# Patient Record
Sex: FEMALE | Race: WHITE | NOT HISPANIC OR LATINO | Employment: FULL TIME | URBAN - METROPOLITAN AREA
[De-identification: names, ages, dates, MRNs, and addresses within clinical notes are randomized per-mention and may not be internally consistent; named-entity substitution may affect disease eponyms.]

---
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
## 2019-01-08 NOTE — PROGRESS NOTES
SUBJECTIVE:   Arlen William is a 18 year old female who presents to clinic today for the following health issues:      History of Present Illness     Diet:  Regular (no restrictions)  Frequency of exercise:  4-5 days/week  Duration of exercise:  Greater than 60 minutes  Taking medications regularly:  Yes  Medication side effects:  None  Additional concerns today:  No    Rash    Onset: 12/20/2018 - vaginal rash.      Ongoing rash since last office on 12/20/2018 - chest and forearms - Dx tinea versicolor 12/20/18- note from Phuong Montes DNP reviewed. Treated w/oral fluconazole. She does not think it is better on the arms. She still has more doses to take.     Skin on outside labia, raised patch of skin. Itches. Noticed after she shaved in the area a few weeks ago.   Sexually active not presently. Last 7-8 mo ago. Using condoms. No vaginal discharge.  No LMP recorded (lmp unknown). Patient has had an implant. Nexplanon.   Just had STD testing and was negative.   No new topicals. Using bath and body works scented shower gels    Description:   Location: both forearms , chest , vagina   Character: white bumps , blotchy , raised   Itching (Pruritis): YES    Progression of Symptoms:  worsening    Accompanying Signs & Symptoms:  Fever: no   Body aches or joint pain: no   Sore throat symptoms: no   Recent cold symptoms: no     History:   Previous similar rash: no     Precipitating factors:   Exposure to similar rash: no   New exposures: None   Recent travel: no     Therapies Tried and outcome: Diflucan 150 MG   Problem list and histories reviewed & adjusted, as indicated.  Additional history: as documented        Patient Active Problem List   Diagnosis     Environmental allergies     Mild persistent asthma     Scoliosis     Leg length discrepancy     Torn ACL     Chronic rhinitis     Tinea versicolor     Dysmenorrhea     Other acne     Chest wall pain     Oropharyngeal dysphagia     Exercise-induced asthma     Past  Surgical History:   Procedure Laterality Date     ARTHROSCOPIC RECONSTRUCTION ANTERIOR CRUCIATE LIGAMENT  8/19/13    left     ESOPHAGOSCOPY, GASTROSCOPY, DUODENOSCOPY (EGD), COMBINED N/A 12/2/2016    Procedure: COMBINED ESOPHAGOSCOPY, GASTROSCOPY, DUODENOSCOPY (EGD), BIOPSY SINGLE OR MULTIPLE;  Surgeon: Laurie Pride MD;  Location: [facility]      HC TOOTH EXTRACTION W/FORCEP         Social History     Tobacco Use     Smoking status: Never Smoker     Smokeless tobacco: Never Used     Tobacco comment: no smokers at home   Substance Use Topics     Alcohol use: No     Comment: none      Family History   Problem Relation Age of Onset     Diabetes Mother         gestational      GERD Mother      Gallbladder Disease Mother      Diabetes Maternal Grandmother      Cancer Maternal Grandmother         ovarian and uterine     Hypertension Father      Lipids Father      Hypertension Paternal Grandfather      Family History Negative No family hx of      Asthma No family hx of         Eosinophilic esophagitis.     C.A.D. No family hx of      Breast Cancer No family hx of      Cerebrovascular Disease No family hx of      Cancer - colorectal No family hx of      Prostate Cancer No family hx of      Alcohol/Drug No family hx of      Allergies No family hx of      Alzheimer Disease No family hx of      Anesthesia Reaction No family hx of      Arthritis No family hx of      Blood Disease No family hx of      Cardiovascular No family hx of      Circulatory No family hx of      Congenital Anomalies No family hx of      Connective Tissue Disorder No family hx of      Depression No family hx of      Endocrine Disease No family hx of      Eye Disorder No family hx of      Genetic Disorder No family hx of      Gastrointestinal Disease No family hx of      Genitourinary Problems No family hx of      Gynecology No family hx of      Musculoskeletal Disorder No family hx of      Anxiety Disorder No family hx of       "Mental Illness No family hx of      Substance Abuse No family hx of      Colon Cancer No family hx of      Other Cancer No family hx of      Thyroid Disease No family hx of      Obesity No family hx of      Osteoporosis No family hx of      Unknown/Adopted No family hx of      Hyperlipidemia No family hx of      Coronary Artery Disease No family hx of      Other - See Comments No family hx of          Current Outpatient Medications   Medication Sig Dispense Refill     Cholecalciferol (VITAMIN D) 1000 UNITS capsule Take 1 capsule by mouth daily.       Etonogestrel (NEXPLANON SC)        fluconazole (DIFLUCAN) 150 MG tablet Take 1 tablet (150 mg) by mouth every 3 days 8 tablet 0     Multiple Vitamin (MULTIVITAMINS PO) Take 1 tablet by mouth daily.       albuterol (PROAIR HFA) 108 (90 Base) MCG/ACT inhaler Inhale 2 puffs into the lungs 4 times daily as needed for shortness of breath / dyspnea (Patient not taking: Reported on 1/9/2019) 1 Inhaler 1     No Known Allergies  BP Readings from Last 3 Encounters:   01/09/19 112/70 (40 %/ 64 %)*   12/20/18 116/70 (61 %/ 63 %)*   12/12/17 112/80 (46 %/ 92 %)*     *BP percentiles are based on the August 2017 AAP Clinical Practice Guideline for girls    Wt Readings from Last 3 Encounters:   01/09/19 68.9 kg (152 lb) (82 %)*   12/20/18 68.5 kg (151 lb) (82 %)*   12/15/17 70.8 kg (156 lb) (87 %)*     * Growth percentiles are based on CDC (Girls, 2-20 Years) data.                  Labs reviewed in EPIC    ROS:  Constitutional, HEENT, cardiovascular, pulmonary, gi and gu systems are negative, except as otherwise noted.    OBJECTIVE:     /70   Pulse 75   Temp 98.4  F (36.9  C) (Temporal)   Resp 18   Ht 1.727 m (5' 8\")   Wt 68.9 kg (152 lb)   LMP  (LMP Unknown)   SpO2 100%   Breastfeeding? No   BMI 23.11 kg/m    Body mass index is 23.11 kg/m .  GENERAL: healthy, alert and no distress   (female): normal female external genitalia, normal urethral meatus, vaginal mucosa, " "normal cervix/adnexa/uterus without masses or discharge  SKIN: right labia majora, upper portion near clitoral alexis,: skin is shaved, 1 cm of mildly pink inflammed appearing tissue. No papules, pustules, ulcers, of lesions. Vaginal tissue normal, no discharge at introitus, and exam otherwise normal. No spec or bimanual done.  Forearms: small 2-3mm faint hypo-pigmented macules on forearms. Chest and upper posterior torso is normal.   Diagnostics: none.    ASSESSMENT/PLAN:     1. Labial irritation  Very benign appearance. Inflammation from shaving or topical irritation.   Not consistent w/STD.   Discussed topical hydorcortisone for a short course, avoid shaving this area, \"free and clear\" products/Dove soap    2. Tinea versicolor  Chest and upper back clear  Forearms minimal lesions.   Pt is finishing fluconazole therapy still.      Follow Up: The patient was instructed to contact clinic for worsening symptoms, non-improvement as expected/discussed, and for questions regarding medications or treatment plan. Discussed parameters for follow up and included in After Visit Summary given to patient.      Kristy Tillman PA-C  [facility]  "

## 2019-01-09 ENCOUNTER — OFFICE VISIT (OUTPATIENT)
Dept: FAMILY MEDICINE | Facility: CLINIC | Age: 19
End: 2019-01-09
Payer: COMMERCIAL

## 2019-01-09 VITALS
HEART RATE: 75 BPM | WEIGHT: 152 LBS | BODY MASS INDEX: 23.04 KG/M2 | HEIGHT: 68 IN | SYSTOLIC BLOOD PRESSURE: 112 MMHG | RESPIRATION RATE: 18 BRPM | OXYGEN SATURATION: 100 % | DIASTOLIC BLOOD PRESSURE: 70 MMHG | TEMPERATURE: 98.4 F

## 2019-01-09 DIAGNOSIS — B36.0 TINEA VERSICOLOR: ICD-10-CM

## 2019-01-09 DIAGNOSIS — N90.89 LABIAL IRRITATION: Primary | ICD-10-CM

## 2019-01-09 PROCEDURE — 99213 OFFICE O/P EST LOW 20 MIN: CPT | Performed by: PHYSICIAN ASSISTANT

## 2019-01-09 ASSESSMENT — PAIN SCALES - GENERAL: PAINLEVEL: NO PAIN (0)

## 2019-01-09 ASSESSMENT — MIFFLIN-ST. JEOR: SCORE: 1512.97

## 2019-01-09 NOTE — PATIENT INSTRUCTIONS
Would use over the counter hydrocortisone 1% cream to the area twice daily.   Avoid shaving in the this area to allow to calm down    Does not have appearance of an STD. More typical of irritation/inflammation from shaving.

## 2019-05-28 NOTE — PROGRESS NOTES
Subjective     Arlen William is a 18 year old female who presents to clinic today for the following health issues:    HPI     Vaginal Symptoms  Onset: One month      Description:  Vaginal Discharge: none   Itching (Pruritis): YES  Burning sensation:  no   Odor: no     Accompanying Signs & Symptoms:  Pain with Urination: no   Abdominal Pain: no   Fever: no     History:   Sexually active: YES  New Partner: YES  Possibility of Pregnancy:  No    Precipitating factors:   Recent Antibiotic Use: no     Alleviating factors:  NA  Therapies Tried and outcome: Nothing     She previously saw Kristy Tillman, who thought that the red bumps, which at that time were more external, were related to shaving irritation. The red bumps and itching is more internal now. She is worried that it could be a soap that she is using, or an STD as she is sexually active currently. She hasn't had intercourse in over a month, and her condition hasn't worsened since then.   She has never had a Pap smear and doesn't know if she needs one. She denies burning with urination, her only symptom at this time is the itching.     Patient Active Problem List   Diagnosis     Environmental allergies     Mild persistent asthma     Scoliosis     Leg length discrepancy     Torn ACL     Chronic rhinitis     Tinea versicolor     Dysmenorrhea     Other acne     Chest wall pain     Oropharyngeal dysphagia     Exercise-induced asthma     Past Surgical History:   Procedure Laterality Date     ARTHROSCOPIC RECONSTRUCTION ANTERIOR CRUCIATE LIGAMENT  8/19/13    left     ESOPHAGOSCOPY, GASTROSCOPY, DUODENOSCOPY (EGD), COMBINED N/A 12/2/2016    Procedure: COMBINED ESOPHAGOSCOPY, GASTROSCOPY, DUODENOSCOPY (EGD), BIOPSY SINGLE OR MULTIPLE;  Surgeon: Laurie Pride MD;  Location:  [facility]      HC TOOTH EXTRACTION W/FORCEP         Social History     Tobacco Use     Smoking status: Never Smoker     Smokeless tobacco: Never Used     Tobacco comment: no  smokers at home   Substance Use Topics     Alcohol use: No     Comment: none      Family History   Problem Relation Age of Onset     Diabetes Mother         gestational      GERD Mother      Gallbladder Disease Mother      Diabetes Maternal Grandmother      Cancer Maternal Grandmother         ovarian and uterine     Hypertension Father      Lipids Father      Hypertension Paternal Grandfather      Family History Negative No family hx of      Asthma No family hx of         Eosinophilic esophagitis.     C.A.D. No family hx of      Breast Cancer No family hx of      Cerebrovascular Disease No family hx of      Cancer - colorectal No family hx of      Prostate Cancer No family hx of      Alcohol/Drug No family hx of      Allergies No family hx of      Alzheimer Disease No family hx of      Anesthesia Reaction No family hx of      Arthritis No family hx of      Blood Disease No family hx of      Cardiovascular No family hx of      Circulatory No family hx of      Congenital Anomalies No family hx of      Connective Tissue Disorder No family hx of      Depression No family hx of      Endocrine Disease No family hx of      Eye Disorder No family hx of      Genetic Disorder No family hx of      Gastrointestinal Disease No family hx of      Genitourinary Problems No family hx of      Gynecology No family hx of      Musculoskeletal Disorder No family hx of      Anxiety Disorder No family hx of      Mental Illness No family hx of      Substance Abuse No family hx of      Colon Cancer No family hx of      Other Cancer No family hx of      Thyroid Disease No family hx of      Obesity No family hx of      Osteoporosis No family hx of      Unknown/Adopted No family hx of      Hyperlipidemia No family hx of      Coronary Artery Disease No family hx of      Other - See Comments No family hx of          Current Outpatient Medications   Medication Sig Dispense Refill     Cholecalciferol (VITAMIN D) 1000 UNITS capsule Take 1 capsule by  "mouth daily.       Etonogestrel (NEXPLANON SC)        Multiple Vitamin (MULTIVITAMINS PO) Take 1 tablet by mouth daily.       Reviewed and updated as needed this visit by Provider       Review of Systems   Constitutional, HEENT, cardiovascular, pulmonary, GI, , musculoskeletal, neuro, skin, endocrine and psych systems are negative, except as in HPI or otherwise noted.     This document serves as a record of the services and decisions personally performed and made by Nuria Farr MD. It was created on her behalf by Ludmila Cunningham, a trained medical scribe. The creation of this document is based the provider's statements to the medical scribe.  Ludmila Cunningham, May 29, 2019 11:12 AM         Objective    /68 (BP Location: Left arm, Patient Position: Chair, Cuff Size: Adult Regular)   Pulse 70   Temp 98.6  F (37  C) (Temporal)   Resp 16   Ht 1.727 m (5' 8\")   Wt 69.6 kg (153 lb 8 oz)   SpO2 99%   Breastfeeding? No   BMI 23.34 kg/m     Body mass index is 23.34 kg/m .  Physical Exam   GENERAL: healthy, alert and no distress  RESP: lungs clear to auscultation - no rales, rhonchi or wheezes  CV: regular rate and rhythm, normal S1 S2, no S3 or S4, no murmur, click or rub, no peripheral edema and peripheral pulses strong   (female): normal female external genitalia, normal urethral meatus, vaginal mucosa pink, moist, well rugated, and normal cervix/adnexa/uterus without masses. Moderate amount of normal discharge noted. Swab collected and sent to the lab. There is no external or internal rash appreciated.   SKIN: no suspicious lesions or rashes to visible skin  PSYCH: mentation appears normal, affect normal/bright    Diagnostic Test Results:  Results for orders placed or performed in visit on 05/29/19 (from the past 24 hour(s))   Wet prep   Result Value Ref Range    Specimen Description Vagina     Wet Prep No Trichomonas seen     Wet Prep No clue cells seen     Wet Prep No yeast seen     Wet Prep No WBCs seen        "     Assessment & Plan       ICD-10-CM    1. Vaginal itching N89.8 Wet prep      Vaginal Itching: Wet prep was normal today. Offered GC/Chlamydia testing today, but she has concerns with cost. Recommended that she visit a community resource for STD testing such as [facility].     Patient Instructions   Wet prep was negative for possible causes of itching.         No follow-ups on file.    The information in this document, created by the medical scribe for me, accurately reflects the services I personally performed and the decisions made by me. I have reviewed and approved this document for accuracy.     Nuria Farr MD, MD  [facility]

## 2019-05-29 ENCOUNTER — OFFICE VISIT (OUTPATIENT)
Dept: FAMILY MEDICINE | Facility: OTHER | Age: 19
End: 2019-05-29
Payer: COMMERCIAL

## 2019-05-29 VITALS
HEIGHT: 68 IN | BODY MASS INDEX: 23.27 KG/M2 | DIASTOLIC BLOOD PRESSURE: 68 MMHG | OXYGEN SATURATION: 99 % | TEMPERATURE: 98.6 F | RESPIRATION RATE: 16 BRPM | WEIGHT: 153.5 LBS | HEART RATE: 70 BPM | SYSTOLIC BLOOD PRESSURE: 122 MMHG

## 2019-05-29 DIAGNOSIS — N89.8 VAGINAL ITCHING: Primary | ICD-10-CM

## 2019-05-29 LAB
SPECIMEN SOURCE: NORMAL
WET PREP SPEC: NORMAL

## 2019-05-29 PROCEDURE — 99213 OFFICE O/P EST LOW 20 MIN: CPT | Performed by: FAMILY MEDICINE

## 2019-05-29 PROCEDURE — 87210 SMEAR WET MOUNT SALINE/INK: CPT | Performed by: FAMILY MEDICINE

## 2019-05-29 ASSESSMENT — MIFFLIN-ST. JEOR: SCORE: 1519.77

## 2019-06-07 ENCOUNTER — OFFICE VISIT (OUTPATIENT)
Dept: PEDIATRICS | Facility: CLINIC | Age: 19
End: 2019-06-07
Payer: COMMERCIAL

## 2019-06-07 VITALS
SYSTOLIC BLOOD PRESSURE: 108 MMHG | WEIGHT: 153.9 LBS | TEMPERATURE: 98.1 F | OXYGEN SATURATION: 98 % | BODY MASS INDEX: 23.33 KG/M2 | DIASTOLIC BLOOD PRESSURE: 73 MMHG | HEART RATE: 79 BPM | HEIGHT: 68 IN

## 2019-06-07 DIAGNOSIS — J02.9 SORE THROAT: ICD-10-CM

## 2019-06-07 DIAGNOSIS — L04.0 ACUTE CERVICAL ADENITIS: Primary | ICD-10-CM

## 2019-06-07 DIAGNOSIS — B27.90 INFECTIOUS MONONUCLEOSIS WITHOUT COMPLICATION, INFECTIOUS MONONUCLEOSIS DUE TO UNSPECIFIED ORGANISM: ICD-10-CM

## 2019-06-07 LAB
BASOPHILS # BLD AUTO: 0.1 10E9/L (ref 0–0.2)
BASOPHILS NFR BLD AUTO: 1 %
DEPRECATED S PYO AG THROAT QL EIA: NORMAL
DIFFERENTIAL METHOD BLD: ABNORMAL
ERYTHROCYTE [DISTWIDTH] IN BLOOD BY AUTOMATED COUNT: 12.2 % (ref 10–15)
HCT VFR BLD AUTO: 40 % (ref 35–47)
HETEROPH AB SER QL: POSITIVE
HGB BLD-MCNC: 13 G/DL (ref 11.7–15.7)
LYMPHOCYTES # BLD AUTO: 8.3 10E9/L (ref 0.8–5.3)
LYMPHOCYTES NFR BLD AUTO: 75 %
MCH RBC QN AUTO: 29.3 PG (ref 26.5–33)
MCHC RBC AUTO-ENTMCNC: 32.5 G/DL (ref 31.5–36.5)
MCV RBC AUTO: 90 FL (ref 78–100)
NEUTROPHILS # BLD AUTO: 2.7 10E9/L (ref 1.6–8.3)
NEUTROPHILS NFR BLD AUTO: 24 %
PLATELET # BLD AUTO: 199 10E9/L (ref 150–450)
PLATELET # BLD EST: ABNORMAL 10*3/UL
RBC # BLD AUTO: 4.44 10E12/L (ref 3.8–5.2)
SMUDGE CELLS BLD QL SMEAR: PRESENT
SPECIMEN SOURCE: NORMAL
VARIANT LYMPHS BLD QL SMEAR: PRESENT
WBC # BLD AUTO: 11.1 10E9/L (ref 4–11)

## 2019-06-07 PROCEDURE — 87081 CULTURE SCREEN ONLY: CPT | Performed by: FAMILY MEDICINE

## 2019-06-07 PROCEDURE — 85025 COMPLETE CBC W/AUTO DIFF WBC: CPT | Performed by: FAMILY MEDICINE

## 2019-06-07 PROCEDURE — 99214 OFFICE O/P EST MOD 30 MIN: CPT | Performed by: FAMILY MEDICINE

## 2019-06-07 PROCEDURE — 36415 COLL VENOUS BLD VENIPUNCTURE: CPT | Performed by: FAMILY MEDICINE

## 2019-06-07 PROCEDURE — 86308 HETEROPHILE ANTIBODY SCREEN: CPT | Performed by: FAMILY MEDICINE

## 2019-06-07 PROCEDURE — 87880 STREP A ASSAY W/OPTIC: CPT | Performed by: FAMILY MEDICINE

## 2019-06-07 RX ORDER — PREDNISONE 20 MG/1
20 TABLET ORAL DAILY
Qty: 5 TABLET | Refills: 0 | Status: SHIPPED | OUTPATIENT
Start: 2019-06-07 | End: 2019-07-29

## 2019-06-07 ASSESSMENT — MIFFLIN-ST. JEOR: SCORE: 1521.59

## 2019-06-07 NOTE — RESULT ENCOUNTER NOTE
Dear Arlen,  Your mono test came back positive likely explaining your current symptoms.  As we discussed, I have sent a prescription for prednisone to help with swelling and pain.  You can take over-the-counter ibuprofen 400 mg up to 3 times a day as needed for pain.  Make sure to drink plenty of liquids, rest well, avoid exertional physical workouts until you feel fine  The throat swab for strep test is negative.  Please follow-up with Phuong allen or with us in 1 week for recheck or sooner if needed.  Let me know if you have any questions. Take care.  Andrew Crenshaw MD

## 2019-06-07 NOTE — PROGRESS NOTES
"Subjective     Arlen William is a 18 year old female who presents to clinic today for the following health issues:    HPI   Acute Illness    Patient is new to the provider, she is here today with concerns of swollen and painful lymph nodes on both sides of the neck, right side worse than the left for the past 6 to 7 days associated with mild sore throat and nasal congestion and with no associated concerns for fever, chills, voice changes, cough, shortness of breath, wheezing, abdominal pain, bloating, vomiting, abnormal skin rashes.  Patient denies previous similar symptoms in the past.    Denies previous history of mono or recurrent history of streptococcal infections  Does not smoke. Has no h/o asthnma or allergies.       Acute illness concerns: Swollen lymph nodes  Onset: 6 days    Fever: no    Chills/Sweats: no     Headache (location?): YES- constant, dull ache    Sinus Pressure:no    Conjunctivitis:  no    Ear Pain: no    Rhinorrhea: no    Congestion: YES    Sore Throat: YES- a little     Cough: no    Wheeze: no    Decreased Appetite: no    Nausea: YES    Vomiting: no    Diarrhea:  no    Dysuria/Freq.: no    Fatigue/Achiness: YES- tired    Sick/Strep Exposure: no    Pt. States shoulder and neck area has been \"achy\" as well.   Therapies Tried and outcome: Ibuprofen         Patient Active Problem List   Diagnosis     Environmental allergies     Mild persistent asthma     Scoliosis     Leg length discrepancy     Torn ACL     Chronic rhinitis     Tinea versicolor     Dysmenorrhea     Other acne     Chest wall pain     Oropharyngeal dysphagia     Exercise-induced asthma     Past Surgical History:   Procedure Laterality Date     ARTHROSCOPIC RECONSTRUCTION ANTERIOR CRUCIATE LIGAMENT  8/19/13    left     ESOPHAGOSCOPY, GASTROSCOPY, DUODENOSCOPY (EGD), COMBINED N/A 12/2/2016    Procedure: COMBINED ESOPHAGOSCOPY, GASTROSCOPY, DUODENOSCOPY (EGD), BIOPSY SINGLE OR MULTIPLE;  Surgeon: Laurie Pride, " MD;  Location:  [facility]      HC TOOTH EXTRACTION W/FORCEP         Social History     Tobacco Use     Smoking status: Never Smoker     Smokeless tobacco: Never Used     Tobacco comment: no smokers at home   Substance Use Topics     Alcohol use: No     Comment: none      Family History   Problem Relation Age of Onset     Diabetes Mother         gestational      GERD Mother      Gallbladder Disease Mother      Diabetes Maternal Grandmother      Cancer Maternal Grandmother         ovarian and uterine     Hypertension Father      Lipids Father      Hypertension Paternal Grandfather      Family History Negative No family hx of      Asthma No family hx of         Eosinophilic esophagitis.     C.A.D. No family hx of      Breast Cancer No family hx of      Cerebrovascular Disease No family hx of      Cancer - colorectal No family hx of      Prostate Cancer No family hx of      Alcohol/Drug No family hx of      Allergies No family hx of      Alzheimer Disease No family hx of      Anesthesia Reaction No family hx of      Arthritis No family hx of      Blood Disease No family hx of      Cardiovascular No family hx of      Circulatory No family hx of      Congenital Anomalies No family hx of      Connective Tissue Disorder No family hx of      Depression No family hx of      Endocrine Disease No family hx of      Eye Disorder No family hx of      Genetic Disorder No family hx of      Gastrointestinal Disease No family hx of      Genitourinary Problems No family hx of      Gynecology No family hx of      Musculoskeletal Disorder No family hx of      Anxiety Disorder No family hx of      Mental Illness No family hx of      Substance Abuse No family hx of      Colon Cancer No family hx of      Other Cancer No family hx of      Thyroid Disease No family hx of      Obesity No family hx of      Osteoporosis No family hx of      Unknown/Adopted No family hx of      Hyperlipidemia No family hx of      Coronary Artery Disease No  "family hx of      Other - See Comments No family hx of          Current Outpatient Medications   Medication Sig Dispense Refill     Cholecalciferol (VITAMIN D) 1000 UNITS capsule Take 1 capsule by mouth daily.       Etonogestrel (NEXPLANON SC)        IBUPROFEN PO Take by mouth as needed for moderate pain       Multiple Vitamin (MULTIVITAMINS PO) Take 1 tablet by mouth daily.       No Known Allergies  No lab results found.   BP Readings from Last 3 Encounters:   06/07/19 108/73   05/29/19 122/68   01/09/19 112/70    Wt Readings from Last 3 Encounters:   06/07/19 69.8 kg (153 lb 14.4 oz) (83 %)*   05/29/19 69.6 kg (153 lb 8 oz) (83 %)*   01/09/19 68.9 kg (152 lb) (82 %)*     * Growth percentiles are based on CDC (Girls, 2-20 Years) data.                      Reviewed and updated as needed this visit by Provider         Review of Systems   ROS COMP: CONSTITUTIONAL: NEGATIVE for fever, chills, change in weight  INTEGUMENTARY/SKIN: NEGATIVE for worrisome rashes, moles or lesions  EYES: NEGATIVE for vision changes or irritation  ENT/MOUTH: as above  RESP: NEGATIVE for significant cough or SOB  CV: NEGATIVE for chest pain, palpitations or peripheral edema  GI: NEGATIVE for nausea, abdominal pain, heartburn, or change in bowel habits  MUSCULOSKELETAL: NEGATIVE for significant arthralgias or myalgia  PSYCHIATRIC: NEGATIVE for changes in mood or affect      Objective    /73 (BP Location: Right arm, Patient Position: Sitting, Cuff Size: Adult Regular)   Pulse 79   Temp 98.1  F (36.7  C) (Oral)   Ht 1.727 m (5' 8\")   Wt 69.8 kg (153 lb 14.4 oz)   SpO2 98%   BMI 23.40 kg/m     Body mass index is 23.4 kg/m .  Physical Exam   GENERAL: healthy, alert and no distress  EYES: Eyes grossly normal to inspection  HENT: normal cephalic/atraumatic, ear canals and TM's normal, nose and mouth without ulcers or lesions, oropharynx clear, oral mucous membranes moist, sinuses: not tender and minimal posterior pharyngeal wall and " tonsillar erythema and with no exudates  NECK: bilateral tender, anterior cervical adenopathy, no asymmetry, masses, or scars and thyroid normal to palpation  RESP: lungs clear to auscultation - no rales, rhonchi or wheezes  CV: regular rate and rhythm, normal S1 S2, no S3 or S4, no murmur, click or rub, no peripheral edema and peripheral pulses strong  ABDOMEN: soft, nontender, no hepatosplenomegaly, no masses and bowel sounds normal  MS: no gross musculoskeletal defects noted, no edema  SKIN: no suspicious lesions or rashes  PSYCH: mentation appears normal, affect normal/bright    Diagnostic Test Results:  Labs reviewed in Epic  Results for orders placed or performed in visit on 06/07/19 (from the past 24 hour(s))   Strep, Rapid Screen   Result Value Ref Range    Specimen Description Throat     Rapid Strep A Screen       NEGATIVE: No Group A streptococcal antigen detected by immunoassay, await culture report.   Mononucleosis screen   Result Value Ref Range    Mononucleosis Screen Positive (A) NEG^Negative   CBC with platelets and differential   Result Value Ref Range    WBC 11.1 (H) 4.0 - 11.0 10e9/L    RBC Count 4.44 3.8 - 5.2 10e12/L    Hemoglobin 13.0 11.7 - 15.7 g/dL    Hematocrit 40.0 35.0 - 47.0 %    MCV 90 78 - 100 fl    MCH 29.3 26.5 - 33.0 pg    MCHC 32.5 31.5 - 36.5 g/dL    RDW 12.2 10.0 - 15.0 %    Platelet Count 199 150 - 450 10e9/L    Diff Method PENDING            Assessment & Plan     1. Acute cervical adenitis    ddx-nonspecific adenitis/infectious mononucleosis/streptococcal pharyngitis  Strep-negative  CBC-slightly elevated white cell count-  Lab Results   Component Value Date    WBC 11.1 06/07/2019     Lab Results   Component Value Date    RBC 4.44 06/07/2019     Lab Results   Component Value Date    HGB 13.0 06/07/2019     Lab Results   Component Value Date    HCT 40.0 06/07/2019     No components found for: MCT  Lab Results   Component Value Date    MCV 90 06/07/2019     Lab Results    Component Value Date    MCH 29.3 06/07/2019     Lab Results   Component Value Date    MCHC 32.5 06/07/2019     Lab Results   Component Value Date    RDW 12.2 06/07/2019     Lab Results   Component Value Date     06/07/2019       Reviewed positive mono testing    - Strep, Rapid Screen  - Mononucleosis screen  - CBC with platelets and differential  - Beta strep group A culture    2. Sore throat  Reviewed negative strep results  Recommended warm saline gargles, tylenol or motrin prn for pain, throat lozenges, fluids and rtc if no better by 1week or sooner prn.      - Strep, Rapid Screen  - Mononucleosis screen  - CBC with platelets and differential  - Beta strep group A culture    3. Infectious mononucleosis without complication, infectious mononucleosis due to unspecified organism  Reviewed etiology of infectious mononucleosis  Try supportive treatment including rest, pushing fluids, over-the-counter ibuprofen as needed for pain, emphasized on avoiding exertional physical workouts and contact sports  Prescription given for prednisone for symptomatic therapy to reduce throat discomfort and neck swelling  Patient will follow-up with PCP or with one of us here in 1 week for recheck or sooner if needed  Dosing and potential medication side effects discussed.    - predniSONE (DELTASONE) 20 MG tablet; Take 1 tablet (20 mg) by mouth daily  Dispense: 5 tablet; Refill: 0       Chart documentation done in part with Dragon Voice recognition Software. Although reviewed after completion, some word and grammatical error may remain.  Chart forwarded to PCP for FYI     See Patient Instructions    No follow-ups on file.    Andrew Crenshaw MD  [facility]

## 2019-06-08 LAB
BACTERIA SPEC CULT: NORMAL
SPECIMEN SOURCE: NORMAL

## 2019-07-26 NOTE — PROGRESS NOTES
SUBJECTIVE:   CC: Arlen William is an 19 year old woman who presents for preventive health visit.     Healthy Habits:     Getting at least 3 servings of Calcium per day:  Yes    Bi-annual eye exam:  Yes    Dental care twice a year:  Yes    Sleep apnea or symptoms of sleep apnea:  None    Diet:  Regular (no restrictions)    Frequency of exercise:  4-5 days/week    Duration of exercise:  45-60 minutes    Taking medications regularly:  Not Applicable    Medication side effects:  Not applicable    PHQ-2 Total Score: 0    Additional concerns today:  No      Routine Health Maintenance:  Fasting: no   Immunizations: got tetanus last May 2018 with the [employer]   Mammogram:  Hx of abnormal mammogram: no  Colonoscopy: Fam hx of colon cancer: no  Pap:n/a   Sexually active: yes. STD screening: yes. Uses condoms consistently   Periods/menopause/contraception: Nexplanon- I love it. No bleeding for 1st year. But now has irregular bleeding- not as long and not as heavy- 3-4 days and light. Some cramps.     PCB, pelvic pain, dyspareunia, vaginal discharge: no     Combat medic with the  - [employer]. Headed to [facility] in 2 days.     Treated for chlamydia through teen clinic Feb 2019. Hasn't had SIS.   Her partner was also seen and treated. They are not together any longer.      Asthma: sports induced in elementary school.  Inorder to do [employer] had to do testing with pulmonology. Can see PFTs from 02/02/2017  Never needing inhalers. Pt states she thought she was told she no longer had asthma.   ACT Total Scores 11/10/2017 12/20/2018 7/29/2019   ACT TOTAL SCORE - - -   ASTHMA ER VISITS - - -   ASTHMA HOSPITALIZATIONS - - -   ACT TOTAL SCORE (Goal Greater than or Equal to 20) 25 25 25   In the past 12 months, how many times did you visit the emergency room for your asthma without being admitted to the hospital? 0 0 0   In the past 12 months, how many times were you hospitalized overnight because of your  asthma? 0 0 0         Today's PHQ-2 Score:   PHQ-2 ( 1999 Pfizer) 7/29/2019   Q1: Little interest or pleasure in doing things 0   Q2: Feeling down, depressed or hopeless 0   PHQ-2 Score 0   Q1: Little interest or pleasure in doing things Not at all   Q2: Feeling down, depressed or hopeless Not at all   PHQ-2 Score 0       Abuse: Current or Past(Physical, Sexual or Emotional)- No  Do you feel safe in your environment? Yes    Social History     Tobacco Use     Smoking status: Never Smoker     Smokeless tobacco: Never Used     Tobacco comment: no smokers at home   Substance Use Topics     Alcohol use: No     Comment: none          Alcohol Use 7/29/2019   Prescreen: >3 drinks/day or >7 drinks/week? Not Applicable       Reviewed orders with patient.  Reviewed health maintenance and updated orders accordingly - Yes  Lab work is in process  Labs reviewed in EPIC  BP Readings from Last 3 Encounters:   07/29/19 110/74   06/07/19 108/73   05/29/19 122/68    Wt Readings from Last 3 Encounters:   07/29/19 68.5 kg (151 lb 1.6 oz)   06/07/19 69.8 kg (153 lb 14.4 oz) (83 %)*   05/29/19 69.6 kg (153 lb 8 oz) (83 %)*     * Growth percentiles are based on CDC (Girls, 2-20 Years) data.                  Patient Active Problem List   Diagnosis     Environmental allergies     Mild persistent asthma     Scoliosis     Leg length discrepancy     Torn ACL     Chronic rhinitis     Tinea versicolor     Dysmenorrhea     Other acne     Chest wall pain     Oropharyngeal dysphagia     Exercise-induced asthma     Past Surgical History:   Procedure Laterality Date     ARTHROSCOPIC RECONSTRUCTION ANTERIOR CRUCIATE LIGAMENT  8/19/13    left     ESOPHAGOSCOPY, GASTROSCOPY, DUODENOSCOPY (EGD), COMBINED N/A 12/2/2016    Procedure: COMBINED ESOPHAGOSCOPY, GASTROSCOPY, DUODENOSCOPY (EGD), BIOPSY SINGLE OR MULTIPLE;  Surgeon: Laurie Pride MD;  Location:  PEDS SEDATION      HC TOOTH EXTRACTION W/FORCEP         Social History     Tobacco  Use     Smoking status: Never Smoker     Smokeless tobacco: Never Used     Tobacco comment: no smokers at home   Substance Use Topics     Alcohol use: No     Comment: none      Family History   Problem Relation Age of Onset     Diabetes Mother         gestational      GERD Mother      Gallbladder Disease Mother      Diabetes Maternal Grandmother      Cancer Maternal Grandmother         ovarian and uterine     Hypertension Father      Lipids Father      Hypertension Paternal Grandfather      Family History Negative No family hx of      Asthma No family hx of         Eosinophilic esophagitis.     C.A.D. No family hx of      Breast Cancer No family hx of      Cerebrovascular Disease No family hx of      Cancer - colorectal No family hx of      Prostate Cancer No family hx of      Alcohol/Drug No family hx of      Allergies No family hx of      Alzheimer Disease No family hx of      Anesthesia Reaction No family hx of      Arthritis No family hx of      Blood Disease No family hx of      Cardiovascular No family hx of      Circulatory No family hx of      Congenital Anomalies No family hx of      Connective Tissue Disorder No family hx of      Depression No family hx of      Endocrine Disease No family hx of      Eye Disorder No family hx of      Genetic Disorder No family hx of      Gastrointestinal Disease No family hx of      Genitourinary Problems No family hx of      Gynecology No family hx of      Musculoskeletal Disorder No family hx of      Anxiety Disorder No family hx of      Mental Illness No family hx of      Substance Abuse No family hx of      Colon Cancer No family hx of      Other Cancer No family hx of      Thyroid Disease No family hx of      Obesity No family hx of      Osteoporosis No family hx of      Unknown/Adopted No family hx of      Hyperlipidemia No family hx of      Coronary Artery Disease No family hx of      Other - See Comments No family hx of          Current Outpatient Medications  "  Medication Sig Dispense Refill     Cholecalciferol (VITAMIN D) 1000 UNITS capsule Take 1 capsule by mouth daily.       Etonogestrel (NEXPLANON SC)        IBUPROFEN PO Take by mouth as needed for moderate pain       Multiple Vitamin (MULTIVITAMINS PO) Take 1 tablet by mouth daily.       No Known Allergies    Mammogram not appropriate for this patient based on age.    Pertinent mammograms are reviewed under the imaging tab.  History of abnormal Pap smear: NO - under age 21, PAP not appropriate for age     Reviewed and updated as needed this visit by clinical staff         Reviewed and updated as needed this visit by Provider            Review of Systems   Constitutional: Negative for chills and fever.   HENT: Negative for congestion, ear pain, hearing loss and sore throat.    Eyes: Negative for pain and visual disturbance.   Respiratory: Negative for cough and shortness of breath.    Cardiovascular: Negative for chest pain, palpitations and peripheral edema.   Gastrointestinal: Negative for abdominal pain, constipation, diarrhea, heartburn, hematochezia and nausea.   Breasts:  Negative for tenderness, breast mass and discharge.   Genitourinary: Negative for dysuria, frequency, genital sores, hematuria, pelvic pain, urgency, vaginal bleeding and vaginal discharge.   Musculoskeletal: Negative for arthralgias, joint swelling and myalgias.   Skin: Negative for rash.   Neurological: Negative for dizziness, weakness, headaches and paresthesias.   Psychiatric/Behavioral: Negative for mood changes. The patient is not nervous/anxious.        OBJECTIVE:   /74   Pulse 72   Temp 98.5  F (36.9  C) (Temporal)   Resp 16   Ht 1.727 m (5' 8\")   Wt 68.5 kg (151 lb 1.6 oz)   SpO2 96%   BMI 22.97 kg/m    Physical Exam  GENERAL: healthy, alert and no distress  EYES: Eyes grossly normal to inspection, PERRL and conjunctivae and sclerae normal  HENT: ear canals and TM's normal, nose and mouth without ulcers or lesions  NECK: " no adenopathy, no asymmetry, masses, or scars and thyroid normal to palpation  RESP: lungs clear to auscultation - no rales, rhonchi or wheezes  BREAST: bilateral nipple piercing, skin around piercing clean dry healthy. Normal without masses, tenderness or nipple discharge and no palpable axillary masses or adenopathy  CV: regular rate and rhythm, normal S1 S2, no S3 or S4, no murmur, click or rub, no peripheral edema and peripheral pulses strong  ABDOMEN: soft, nontender, no hepatosplenomegaly, no masses and bowel sounds normal   (female): deferred by pt until age 20  MS: no gross musculoskeletal defects noted, no edema  SKIN: no suspicious lesions or rashes  NEURO: Normal strength and tone, mentation intact and speech normal  PSYCH: mentation appears normal, affect normal/bright    Diagnostic Test Results:  No results found for this or any previous visit (from the past 24 hour(s)).    ASSESSMENT/PLAN:   1. Routine general medical examination at a health care facility  Screening as below  Pap next year at age 20  Immunizations UTD with  - MAHESH obtained  Pt had hgb w/illness visit June 2019- normal.  Discussed lipid and glucose screening at some point in her 20s, she would like to defer today.   - HIV Screening  - Chlamydia trachomatis PCR  - Neisseria gonorrhoeae PCR  - Treponema Abs w Reflex to RPR and Titer  - Asthma Action Plan (AAP)    2. Screen for STD (sexually transmitted disease)  Safe sex practices discussed and advised. STD screening as below.   Pt had chlamydia tx Feb 2019 w/azithromycin. Ensure resolution.  - HIV Screening  - Chlamydia trachomatis PCR  - Neisseria gonorrhoeae PCR  - Treponema Abs w Reflex to RPR and Titer    3. Exercise-induced asthma  Pt declined refill of inhalers  AAP and ACT completed  - Asthma Action Plan (AAP)      COUNSELING:  Reviewed preventive health counseling, as reflected in patient instructions       Regular exercise       Healthy diet/nutrition        "Contraception       Safe sex practices/STD prevention    Estimated body mass index is 23.4 kg/m  as calculated from the following:    Height as of 6/7/19: 1.727 m (5' 8\").    Weight as of 6/7/19: 69.8 kg (153 lb 14.4 oz).         reports that she has never smoked. She has never used smokeless tobacco.      Counseling Resources:  ATP IV Guidelines  Pooled Cohorts Equation Calculator  Breast Cancer Risk Calculator  FRAX Risk Assessment  ICSI Preventive Guidelines  Dietary Guidelines for Americans, 2010  USDA's MyPlate  ASA Prophylaxis  Lung CA Screening    Kristy Tillman PA-C  [facility] [place]  "

## 2019-07-29 ENCOUNTER — OFFICE VISIT (OUTPATIENT)
Dept: FAMILY MEDICINE | Facility: CLINIC | Age: 19
End: 2019-07-29
Payer: COMMERCIAL

## 2019-07-29 VITALS
DIASTOLIC BLOOD PRESSURE: 74 MMHG | OXYGEN SATURATION: 96 % | TEMPERATURE: 98.5 F | WEIGHT: 151.1 LBS | RESPIRATION RATE: 16 BRPM | SYSTOLIC BLOOD PRESSURE: 110 MMHG | BODY MASS INDEX: 22.9 KG/M2 | HEIGHT: 68 IN | HEART RATE: 72 BPM

## 2019-07-29 DIAGNOSIS — Z00.00 ROUTINE GENERAL MEDICAL EXAMINATION AT A HEALTH CARE FACILITY: Primary | ICD-10-CM

## 2019-07-29 DIAGNOSIS — J45.990 EXERCISE-INDUCED ASTHMA: ICD-10-CM

## 2019-07-29 DIAGNOSIS — Z11.3 SCREEN FOR STD (SEXUALLY TRANSMITTED DISEASE): ICD-10-CM

## 2019-07-29 PROCEDURE — 87491 CHLMYD TRACH DNA AMP PROBE: CPT | Performed by: PHYSICIAN ASSISTANT

## 2019-07-29 PROCEDURE — 87389 HIV-1 AG W/HIV-1&-2 AB AG IA: CPT | Performed by: PHYSICIAN ASSISTANT

## 2019-07-29 PROCEDURE — 36415 COLL VENOUS BLD VENIPUNCTURE: CPT | Performed by: PHYSICIAN ASSISTANT

## 2019-07-29 PROCEDURE — 99395 PREV VISIT EST AGE 18-39: CPT | Performed by: PHYSICIAN ASSISTANT

## 2019-07-29 PROCEDURE — 87591 N.GONORRHOEAE DNA AMP PROB: CPT | Performed by: PHYSICIAN ASSISTANT

## 2019-07-29 PROCEDURE — 99213 OFFICE O/P EST LOW 20 MIN: CPT | Mod: 25 | Performed by: PHYSICIAN ASSISTANT

## 2019-07-29 PROCEDURE — 86780 TREPONEMA PALLIDUM: CPT | Performed by: PHYSICIAN ASSISTANT

## 2019-07-29 ASSESSMENT — ENCOUNTER SYMPTOMS
MYALGIAS: 0
PALPITATIONS: 0
CONSTIPATION: 0
WEAKNESS: 0
NERVOUS/ANXIOUS: 0
FEVER: 0
JOINT SWELLING: 0
DIZZINESS: 0
HEMATOCHEZIA: 0
HEADACHES: 0
PARESTHESIAS: 0
NAUSEA: 0
HEARTBURN: 0
COUGH: 0
SORE THROAT: 0
CHILLS: 0
EYE PAIN: 0
BREAST MASS: 0
DYSURIA: 0
FREQUENCY: 0
SHORTNESS OF BREATH: 0
ARTHRALGIAS: 0
DIARRHEA: 0
HEMATURIA: 0
ABDOMINAL PAIN: 0

## 2019-07-29 ASSESSMENT — ASTHMA QUESTIONNAIRES
ACT_TOTALSCORE: 25
QUESTION_4 LAST FOUR WEEKS HOW OFTEN HAVE YOU USED YOUR RESCUE INHALER OR NEBULIZER MEDICATION (SUCH AS ALBUTEROL): NOT AT ALL
QUESTION_1 LAST FOUR WEEKS HOW MUCH OF THE TIME DID YOUR ASTHMA KEEP YOU FROM GETTING AS MUCH DONE AT WORK, SCHOOL OR AT HOME: NONE OF THE TIME
QUESTION_5 LAST FOUR WEEKS HOW WOULD YOU RATE YOUR ASTHMA CONTROL: COMPLETELY CONTROLLED
QUESTION_2 LAST FOUR WEEKS HOW OFTEN HAVE YOU HAD SHORTNESS OF BREATH: NOT AT ALL
QUESTION_3 LAST FOUR WEEKS HOW OFTEN DID YOUR ASTHMA SYMPTOMS (WHEEZING, COUGHING, SHORTNESS OF BREATH, CHEST TIGHTNESS OR PAIN) WAKE YOU UP AT NIGHT OR EARLIER THAN USUAL IN THE MORNING: NOT AT ALL

## 2019-07-29 ASSESSMENT — PAIN SCALES - GENERAL: PAINLEVEL: NO PAIN (0)

## 2019-07-29 ASSESSMENT — MIFFLIN-ST. JEOR: SCORE: 1503.89

## 2019-07-29 NOTE — LETTER
My Asthma Action Plan  Name: Arlen William   YOB: 2000  Date: 7/29/2019   My doctor: Kristy Tillman PA-C   My clinic: [facility] [place]        My Control Medicine: None  My Rescue Medicine: Albuterol (Proair/Ventolin/Proventil) inhaler NOt needing. testing w/pulmonology    My Asthma Severity: exercise induced    Avoid your asthma triggers: exercise or sports               GREEN ZONE   Good Control    I feel good    No cough or wheeze    Can work, sleep and play without asthma symptoms       Take your asthma control medicine every day.     1. If exercise triggers your asthma, take your rescue medication    15 minutes before exercise or sports, and    During exercise if you have asthma symptoms  2. Spacer to use with inhaler: If you have a spacer, make sure to use it with your inhaler             YELLOW ZONE Getting Worse  I have ANY of these:    I do not feel good    Cough or wheeze    Chest feels tight    Wake up at night   1. Keep taking your Green Zone medications  2. Start taking your rescue medicine:    every 20 minutes for up to 1 hour. Then every 4 hours for 24-48 hours.  3. If you stay in the Yellow Zone for more than 12-24 hours, contact your doctor.  4. If you do not return to the Green Zone in 12-24 hours or you get worse, start taking your oral steroid medicine if prescribed by your provider.           RED ZONE Medical Alert - Get Help  I have ANY of these:    I feel awful    Medicine is not helping    Breathing getting harder    Trouble walking or talking    Nose opens wide to breathe       1. Take your rescue medicine NOW  2. If your provider has prescribed an oral steroid medicine, start taking it NOW  3. Call your doctor NOW  4. If you are still in the Red Zone after 20 minutes and you have not reached your doctor:    Take your rescue medicine again and    Call 911 or go to the emergency room right away    See your regular doctor within 2 weeks of an Emergency Room or  Urgent Care visit for follow-up treatment.          Annual Reminders:  Meet with Asthma Educator,  Flu Shot in the Fall, consider Pneumonia Vaccination for patients with asthma (aged 19 and older).    Pharmacy:    [id number] - [place] - [address]  [facility] [place] [address] [facility] [place] [address]  [facility] [address] [facility] [address] [address] - [place] - [address] [id number] - [place] - [address] [place] [address]                      Asthma Triggers  How To Control Things That Make Your Asthma Worse    Triggers are things that make your asthma worse.  Look at the list below to help you find your triggers and what you can do about them.  You can help prevent asthma flare-ups by staying away from your triggers.      Trigger                                                          What you can do   Cigarette Smoke  Tobacco smoke can make asthma worse. Do not allow smoking in your home, car or around you.  Be sure no one smokes at a child s day care or school.  If you smoke, ask your health care provider for ways to help you quit.  Ask family members to quit too.  Ask your health care provider for a referral to Quit Plan to help you quit smoking, or call 8-833-390-PLAN.     Colds, Flu, Bronchitis  These are common triggers of asthma. Wash your hands often.  Don t touch your eyes, nose or mouth.  Get a flu shot every year.     Dust Mites  These are tiny bugs that live in cloth or carpet. They are too small to see. Wash sheets and blankets in hot water every week.   Encase pillows and mattress in dust mite proof covers.  Avoid having carpet if you can. If you have carpet, vacuum weekly.   Use a dust mask and HEPA vacuum.   Pollen and Outdoor Mold  Some people are allergic to trees, grass, or weed pollen, or molds.  Try to keep your windows closed.  Limit time out doors when pollen count is high.   Ask you health care provider about taking medicine during allergy season.     Animal Dander  Some people are allergic to skin flakes, urine or saliva from pets with fur or feathers. Keep pets with fur or feathers out of your home.    If you can t keep the pet outdoors, then keep the pet out of your bedroom.  Keep the bedroom door closed.  Keep pets off cloth furniture and away from stuffed toys.     Mice, Rats, and Cockroaches  Some people are allergic to the waste from these pests.   Cover food and garbage.  Clean up spills and food crumbs.  Store grease in the refrigerator.   Keep food out of the bedroom.   Indoor Mold  This can be a trigger if your home has high moisture. Fix leaking faucets, pipes, or other sources of water.   Clean moldy surfaces.  Dehumidify basement if it is damp and smelly.   Smoke, Strong Odors, and Sprays  These can reduce air quality. Stay away from strong odors and sprays, such as perfume, powder, hair spray, paints, smoke incense, paint, cleaning products, candles and new carpet.   Exercise or Sports  Some people with asthma have this trigger. Be active!  Ask your doctor about taking medicine before sports or exercise to prevent symptoms.    Warm up for 5-10 minutes before and after sports or exercise.     Other Triggers of Asthma  Cold air:  Cover your nose and mouth with a scarf.  Sometimes laughing or crying can be a trigger.  Some medicines and food can trigger asthma.

## 2019-07-30 LAB
C TRACH DNA SPEC QL NAA+PROBE: NEGATIVE
HIV 1+2 AB+HIV1 P24 AG SERPL QL IA: NONREACTIVE
N GONORRHOEA DNA SPEC QL NAA+PROBE: NEGATIVE
SPECIMEN SOURCE: NORMAL
SPECIMEN SOURCE: NORMAL
T PALLIDUM AB SER QL: NONREACTIVE

## 2019-07-30 ASSESSMENT — ASTHMA QUESTIONNAIRES: ACT_TOTALSCORE: 25

## 2019-08-14 NOTE — TELEPHONE ENCOUNTER
Called patient and clarify about the medication. Pt states it not a cream, it oral medication she takes for skin condition that caused by sweat and normally on the arms and chest area. Pt rememeber that she take 1 tablet every three days. Provider please advise.     Mario Ken MA

## 2019-08-14 NOTE — TELEPHONE ENCOUNTER
Reason for Call:  Medication or medication refill:    Do you use a [facility]?  Name of the pharmacy and phone number for the current request:  [facility]    Name of the medication requested: cream for her skin    Other request: patient had a cream for her skin. Mom did not know the name of the medication. Is wondering if she could get a refill[facility]    Can we leave a detailed message on this number? YES    Phone number patient can be reached at: [phone] mom Alka    Best Time: any    Call taken on 8/14/2019 at 11:43 AM by Lindsey Jimenez

## 2019-08-21 DIAGNOSIS — B36.0 TINEA VERSICOLOR: ICD-10-CM

## 2019-08-21 NOTE — TELEPHONE ENCOUNTER
Pending Prescriptions:                       Disp   Refills    fluconazole (DIFLUCAN) 150 MG tablet [Pha*8 tabl*0            Sig: Take 1 tablet (150 mg) by mouth every 3 days    Routing refill request to provider for review/approval because:  Drug not active on patient's medication list    Susi Anna RN, BSN

## 2019-08-21 NOTE — TELEPHONE ENCOUNTER
Patient would like to get this medication today [place].  SHe was hoping Kristy Tillman could take a look at this.  Ok to leave a detailed message.  Liam Scott.

## 2019-08-22 ENCOUNTER — OFFICE VISIT (OUTPATIENT)
Dept: FAMILY MEDICINE | Facility: CLINIC | Age: 19
End: 2019-08-22
Payer: COMMERCIAL

## 2019-08-22 VITALS
HEART RATE: 75 BPM | RESPIRATION RATE: 16 BRPM | SYSTOLIC BLOOD PRESSURE: 100 MMHG | OXYGEN SATURATION: 97 % | BODY MASS INDEX: 22.26 KG/M2 | TEMPERATURE: 98.3 F | WEIGHT: 146.9 LBS | HEIGHT: 68 IN | DIASTOLIC BLOOD PRESSURE: 58 MMHG

## 2019-08-22 DIAGNOSIS — B36.0 TINEA VERSICOLOR: Primary | ICD-10-CM

## 2019-08-22 PROCEDURE — 99213 OFFICE O/P EST LOW 20 MIN: CPT | Performed by: PHYSICIAN ASSISTANT

## 2019-08-22 RX ORDER — KETOCONAZOLE 20 MG/ML
SHAMPOO TOPICAL
Qty: 120 ML | Refills: 3 | Status: SHIPPED | OUTPATIENT
Start: 2019-08-22 | End: 2021-04-30

## 2019-08-22 RX ORDER — FLUCONAZOLE 150 MG/1
300 TABLET ORAL
Qty: 4 TABLET | Refills: 0 | Status: SHIPPED | OUTPATIENT
Start: 2019-08-22 | End: 2019-09-13

## 2019-08-22 ASSESSMENT — MIFFLIN-ST. JEOR: SCORE: 1484.83

## 2019-08-22 NOTE — PATIENT INSTRUCTIONS
Take fluconazole 2 pills together (300mg)  for 1 dose. Then repeat in 7 days.    In the future- in hot/sweaty summer environments- try preventively using the ketoconazole shampoo in the shower.       Patient Education     Tinea Versicolor  This is a rash caused by a fungus in the top layers of the skin. This fungus is normally present in the pores of the skin and causes no symptoms. But when the fungus overgrows it causes a rash. The fungus grows more easily in hot climates, and on oily or sweaty skin. Health experts don t know why some people get this rash and others don t. Experts also don t know why the rash will suddenly appear in someone who has never had it before.  The rash is made up of irregular pale or tan spots and patches. The rash is usually on the neck, upper back, chest, and shoulders. You may have mild itching, especially if you become overheated. But it doesn't cause other symptoms. Because these spots don't change color with sun exposure like normal skin, the rash may be lighter or darker than your normal skin.  This rash is harmless and usually causes no symptoms. The only reason for treatment is to improve appearance. Follow the advice below to clear the rash. It might take several months for normal skin color to return.  Home care    Use a medicated dandruff shampoo over your whole body while in the shower. Don t use soap. Let the shampoo stay on for at least a few minutes before rinsing off. Do this every day for 4 weeks.    As a different treatment, you may buy an antifungal cream (miconazole or clotrimazole, both available without a prescription). Use this 2 times a day for 7 days.     This rash is not contagious to others. It can t be spread if someone touches it. So you don t have to worry about exposing others at school, , or work.  Prevention  This fungus can come back again (recur) after treatment. To prevent return of the rash, use medicated dandruff shampoo over your whole body  when in the shower. Do this once a month for the next year. This is very important to do in the summertime. That is when the rash is most likely to recur.  Other prevention tips include:    Avoid oily skin products    Wear loose clothing. Try to let your skin stay cool and breathe.    Use sunscreen and protect yourself from sunlight    Avoid tanning beds  Follow-up care  Follow up with your healthcare provider, or as advised. Call your provider if the rash doesn t get better with the above treatment, or if new symptoms appear.  When to seek medical advice  Call your healthcare provider right away if any of these occur:    Increasing redness of the rash    Change in appearance of the rash    Fever of 100.4 F (38 C) or higher, or as directed by your provider  Date Last Reviewed: 8/1/2016 2000-2018 [employer]. [address] [place] [address], [place] [address]. All rights reserved. This information is not intended as a substitute for professional medical care. Always follow your healthcare professional's instructions.

## 2019-08-22 NOTE — PROGRESS NOTES
Subjective     Arlen William is a 19 year old female who presents to clinic today for the following health issues:    History of Present Illness        She eats 2-3 servings of fruits and vegetables daily.She consumes 0 sweetened beverage(s) daily.  She is taking medications regularly.     Rash   Recurrence of Tinea versicolor.     Description: Flare up for 1 month.   Patient is in the  [employer].   Location: Lower abdomen, shoulders , arms , and chest.    Character:  White , pinkish spots. No pain.   Itching (Pruritis): No.      Progression of Symptoms:  intermittent    Accompanying Signs & Symptoms:  Fever: no   Body aches or joint pain: no   Sore throat symptoms: no   Recent cold symptoms: no     History:   Previous similar rash: YES    Precipitating factors:   Exposure to similar rash: no   New exposures: None   Recent travel: YES. [place]. - Patient got back from [place] yesterday.     Alleviating factors:       Diflucan 150 Mg tablets     Therapies Tried and outcome: Patient states oral tablets of Diflucan 150 Mg worked better than topical cream.       Patient Active Problem List   Diagnosis     Environmental allergies     Mild persistent asthma     Scoliosis     Leg length discrepancy     Torn ACL     Chronic rhinitis     Tinea versicolor     Dysmenorrhea     Other acne     Chest wall pain     Oropharyngeal dysphagia     Exercise-induced asthma     Past Surgical History:   Procedure Laterality Date     ARTHROSCOPIC RECONSTRUCTION ANTERIOR CRUCIATE LIGAMENT  8/19/13    left     ESOPHAGOSCOPY, GASTROSCOPY, DUODENOSCOPY (EGD), COMBINED N/A 12/2/2016    Procedure: COMBINED ESOPHAGOSCOPY, GASTROSCOPY, DUODENOSCOPY (EGD), BIOPSY SINGLE OR MULTIPLE;  Surgeon: Laurie Pride MD;  Location: [facility]      HC TOOTH EXTRACTION W/FORCEP         Social History     Tobacco Use     Smoking status: Never Smoker     Smokeless tobacco: Never Used     Tobacco comment: no smokers at home    Substance Use Topics     Alcohol use: No     Comment: none      Family History   Problem Relation Age of Onset     Diabetes Mother         gestational      GERD Mother      Gallbladder Disease Mother      Diabetes Maternal Grandmother      Cancer Maternal Grandmother         ovarian and uterine     Hypertension Father      Lipids Father      Hypertension Paternal Grandfather      Family History Negative No family hx of      Asthma No family hx of         Eosinophilic esophagitis.     C.A.D. No family hx of      Breast Cancer No family hx of      Cerebrovascular Disease No family hx of      Cancer - colorectal No family hx of      Prostate Cancer No family hx of      Alcohol/Drug No family hx of      Allergies No family hx of      Alzheimer Disease No family hx of      Anesthesia Reaction No family hx of      Arthritis No family hx of      Blood Disease No family hx of      Cardiovascular No family hx of      Circulatory No family hx of      Congenital Anomalies No family hx of      Connective Tissue Disorder No family hx of      Depression No family hx of      Endocrine Disease No family hx of      Eye Disorder No family hx of      Genetic Disorder No family hx of      Gastrointestinal Disease No family hx of      Genitourinary Problems No family hx of      Gynecology No family hx of      Musculoskeletal Disorder No family hx of      Anxiety Disorder No family hx of      Mental Illness No family hx of      Substance Abuse No family hx of      Colon Cancer No family hx of      Other Cancer No family hx of      Thyroid Disease No family hx of      Obesity No family hx of      Osteoporosis No family hx of      Unknown/Adopted No family hx of      Hyperlipidemia No family hx of      Coronary Artery Disease No family hx of      Other - See Comments No family hx of          Current Outpatient Medications   Medication Sig Dispense Refill     Etonogestrel (NEXPLANON SC)        fluconazole (DIFLUCAN) 150 MG tablet Take 2  "tablets (300 mg) by mouth every 7 days 4 tablet 0     ketoconazole (NIZORAL) 2 % external shampoo Use topically in the shower to chest and arms. 120 mL 3     Cholecalciferol (VITAMIN D) 1000 UNITS capsule Take 1 capsule by mouth daily.       IBUPROFEN PO Take by mouth as needed for moderate pain       Multiple Vitamin (MULTIVITAMINS PO) Take 1 tablet by mouth daily.       No Known Allergies  BP Readings from Last 3 Encounters:   08/22/19 100/58   07/29/19 110/74   06/07/19 108/73    Wt Readings from Last 3 Encounters:   08/22/19 66.6 kg (146 lb 14.4 oz)   07/29/19 68.5 kg (151 lb 1.6 oz)   06/07/19 69.8 kg (153 lb 14.4 oz) (83 %)*     * Growth percentiles are based on CDC (Girls, 2-20 Years) data.                    Reviewed and updated as needed this visit by Provider         Review of Systems   As above        Objective    /58   Pulse 75   Temp 98.3  F (36.8  C) (Temporal)   Resp 16   Ht 1.727 m (5' 8\")   Wt 66.6 kg (146 lb 14.4 oz)   LMP  (LMP Unknown)   SpO2 97%   Breastfeeding? No   BMI 22.34 kg/m    Body mass index is 22.34 kg/m .  Physical Exam   GENERAL: healthy, alert and no distress  SKIN: hypopigmented anular macules on arms and upper chest wall.     Diagnostic Test Results:  none         Assessment & Plan     1. Tinea versicolor  Diflucan as below dosing for tinea versicolor. Discussed possible side effects. Limit alcohol.   Topical to have on hand to use preventively in hot summer weather.   - fluconazole (DIFLUCAN) 150 MG tablet; Take 2 tablets (300 mg) by mouth every 7 days  Dispense: 4 tablet; Refill: 0  - ketoconazole (NIZORAL) 2 % external shampoo; Use topically in the shower to chest and arms.  Dispense: 120 mL; Refill: 3       Follow Up: The patient was instructed to contact clinic for worsening symptoms, non-improvement as expected/discussed, and for questions regarding medications or treatment plan. Discussed parameters for follow up and included in After Visit Summary given to " patient.      Return in about 11 months (around 7/22/2020).    Kristy Tillman PA-C  [facility]

## 2019-08-23 RX ORDER — FLUCONAZOLE 150 MG/1
150 TABLET ORAL
Qty: 8 TABLET | Refills: 0 | OUTPATIENT
Start: 2019-08-23

## 2019-09-09 NOTE — PROGRESS NOTES
Subjective     Arlen William is a 19 year old female who presents to clinic today for the following health issues:    HPI   Vaginal Bleeding (Dysmenorrhea)  Onset: 7/31/19    Description:   Duration of bleeding episodes: daily up until recent  Frequency between periods:  Just spotting  Describe bleeding/flow:   Clots: no   Number of pads/hour: 1-2  Cramping: severe to moderate    Accompanying Signs & Symptoms:  Weakness: no   Lightheadedness: no  Hot flashes: no  Nosebleeds/Easy bruising: no  Vaginal Discharge: YES    History:  No LMP recorded (lmp unknown). Patient has had an implant.  Previous normal periods: YES-before Nexplanon  Contraceptive use: YES, Nexplanon  Possibility of Pregnancy: no   Any bleeding after intercourse: YES  Age of first period (menarche): 9  Abnormal PAP Smears: no     Precipitating factors:   none    Alleviating factors:  none    Therapies Tried and outcome: none  Vaginal Symptoms  Onset: on and off since the end of May    Description:  Vaginal Discharge: dark old blood and stringy   Itching (Pruritis): YES  Burning sensation:  no   Odor: no     Accompanying Signs & Symptoms:  Pain with Urination: no   Abdominal Pain: YES- random cramping  Fever: no     History:   Sexually active: not currenty  New Partner: no   Possibility of Pregnancy:  No    Precipitating factors:   Recent Antibiotic Use: YES- fluconazole    Alleviating factors:  none    Therapies Tried and outcome: none    Additional HPI:   Arlen is a 19-year old female who presents to the clinic today with chief complaint of intermittent vaginal spotting for approximately 6 weeks.  At the beginning of this week, the spotting changed to a white, stringy discharge and she began to have vaginal itching.  There has been no more spotting since that time.  She had a Nexplanon insertion in April 2018.  She states that the end of May she was exposed to chlamydia, tested at [facility], but was not treated due to negative test  results.  She states she thinks she has had symptoms of vaginal itching and discharge on and off since that time.  Denies fevers, lightheadedness, fatigue, or any other acute distress.     Patient Active Problem List   Diagnosis     Environmental allergies     Mild persistent asthma     Scoliosis     Leg length discrepancy     Torn ACL     Chronic rhinitis     Tinea versicolor     Dysmenorrhea     Other acne     Chest wall pain     Oropharyngeal dysphagia     Exercise-induced asthma     Past Surgical History:   Procedure Laterality Date     ARTHROSCOPIC RECONSTRUCTION ANTERIOR CRUCIATE LIGAMENT  8/19/13    left     ESOPHAGOSCOPY, GASTROSCOPY, DUODENOSCOPY (EGD), COMBINED N/A 12/2/2016    Procedure: COMBINED ESOPHAGOSCOPY, GASTROSCOPY, DUODENOSCOPY (EGD), BIOPSY SINGLE OR MULTIPLE;  Surgeon: Laurie Pride MD;  Location: [facility]      HC TOOTH EXTRACTION W/FORCEP         Social History     Tobacco Use     Smoking status: Never Smoker     Smokeless tobacco: Never Used     Tobacco comment: no smokers at home   Substance Use Topics     Alcohol use: No     Comment: none      Family History   Problem Relation Age of Onset     Diabetes Mother         gestational      GERD Mother      Gallbladder Disease Mother      Diabetes Maternal Grandmother      Cancer Maternal Grandmother         ovarian and uterine     Hypertension Father      Lipids Father      Hypertension Paternal Grandfather      Family History Negative No family hx of      Asthma No family hx of         Eosinophilic esophagitis.     C.A.D. No family hx of      Breast Cancer No family hx of      Cerebrovascular Disease No family hx of      Cancer - colorectal No family hx of      Prostate Cancer No family hx of      Alcohol/Drug No family hx of      Allergies No family hx of      Alzheimer Disease No family hx of      Anesthesia Reaction No family hx of      Arthritis No family hx of      Blood Disease No family hx of      Cardiovascular  No family hx of      Circulatory No family hx of      Congenital Anomalies No family hx of      Connective Tissue Disorder No family hx of      Depression No family hx of      Endocrine Disease No family hx of      Eye Disorder No family hx of      Genetic Disorder No family hx of      Gastrointestinal Disease No family hx of      Genitourinary Problems No family hx of      Gynecology No family hx of      Musculoskeletal Disorder No family hx of      Anxiety Disorder No family hx of      Mental Illness No family hx of      Substance Abuse No family hx of      Colon Cancer No family hx of      Other Cancer No family hx of      Thyroid Disease No family hx of      Obesity No family hx of      Osteoporosis No family hx of      Unknown/Adopted No family hx of      Hyperlipidemia No family hx of      Coronary Artery Disease No family hx of      Other - See Comments No family hx of          Current Outpatient Medications   Medication Sig Dispense Refill     Etonogestrel (NEXPLANON SC)        fluconazole (DIFLUCAN) 150 MG tablet Take 1 tablet (150 mg) by mouth once for 1 dose 1 tablet 0     metroNIDAZOLE (FLAGYL) 500 MG tablet Take 1 tablet (500 mg) by mouth 2 times daily for 7 days 14 tablet 0     Multiple Vitamin (MULTIVITAMINS PO) Take 1 tablet by mouth daily.       Cholecalciferol (VITAMIN D) 1000 UNITS capsule Take 1 capsule by mouth daily.       IBUPROFEN PO Take by mouth as needed for moderate pain       ketoconazole (NIZORAL) 2 % external shampoo Use topically in the shower to chest and arms. (Patient not taking: Reported on 9/13/2019) 120 mL 3     No Known Allergies  No lab results found.   BP Readings from Last 3 Encounters:   09/13/19 100/62   08/22/19 100/58   07/29/19 110/74    Wt Readings from Last 3 Encounters:   09/13/19 68.9 kg (152 lb)   08/22/19 66.6 kg (146 lb 14.4 oz)   07/29/19 68.5 kg (151 lb 1.6 oz)                    Reviewed and updated as needed this visit by Provider         Review of Systems    ROS COMP: Constitutional, HEENT, cardiovascular, pulmonary, gi and gu systems are negative, except as otherwise noted.      Objective    /62   Pulse 76   Temp 98.8  F (37.1  C) (Temporal)   Resp 18   Wt 68.9 kg (152 lb)   LMP  (LMP Unknown)   Breastfeeding? No   BMI 23.11 kg/m    Body mass index is 23.11 kg/m .  Physical Exam   GENERAL: healthy, alert and no distress  NECK: no adenopathy, no asymmetry, masses, or scars and thyroid normal to palpation  RESP: lungs clear to auscultation - no rales, rhonchi or wheezes  CV: regular rate and rhythm, normal S1 S2, no S3 or S4, no murmur, click or rub, no peripheral edema and peripheral pulses strong  ABDOMEN: soft, nontender, no hepatosplenomegaly, no masses and bowel sounds normal   (female): normal female external genitalia, normal urethral meatus, vaginal mucosa, normal cervix/adnexa/uterus without masses.  No unusual discharge or odor noted.   MS: no gross musculoskeletal defects noted, no edema  SKIN: no suspicious lesions or rashes  NEURO: Normal strength and tone, mentation intact and speech normal  PSYCH: mentation appears normal, affect normal/bright    Diagnostic Test Results:  Labs reviewed in Epic  Results for orders placed or performed in visit on 09/13/19 (from the past 24 hour(s))   Wet prep   Result Value Ref Range    Specimen Description Vagina     Wet Prep No Trichomonas seen     Wet Prep Clue cells seen  Few   (A)     Wet Prep Yeast seen  Few   (A)     Wet Prep WBC'S seen            Assessment & Plan     1. Candidiasis of vagina  Wet prep shows yeast.  We will treat with the following and advised patient to follow-up for symptoms that are not improving or becoming worse.  She voices understanding and agrees with plan.  - Wet prep  - fluconazole (DIFLUCAN) 150 MG tablet; Take 1 tablet (150 mg) by mouth once for 1 dose  Dispense: 1 tablet; Refill: 0    2. BV (bacterial vaginosis)  Wet prep shows clue cells.  Will treat with the following  and advised patient to follow-up for symptoms that are not improving or becoming worse.  She voices understanding and agrees with plan.  - Wet prep  - metroNIDAZOLE (FLAGYL) 500 MG tablet; Take 1 tablet (500 mg) by mouth 2 times daily for 7 days  Dispense: 14 tablet; Refill: 0    3. Vaginal discharge  Wet prep obtained via pelvic exam today in clinic.  - Wet prep    4. Screen for STD (sexually transmitted disease)  Specimens obtained via pelvic exam today in clinic.  Pending at this time.  - Chlamydia trachomatis PCR  - Neisseria gonorrhoeae PCR       Patient Instructions   Specimens collected today and I will call you with the results.    Please call or return to clinic for any questions, concerns, or symptoms that are not improving or becoming worse.    Naima Mendoza CNP        Return in about 2 weeks (around 9/27/2019) for Symptoms Not Improving/Getting Worse.    Naima Mendoza CNP  [facility]

## 2019-09-13 ENCOUNTER — OFFICE VISIT (OUTPATIENT)
Dept: FAMILY MEDICINE | Facility: CLINIC | Age: 19
End: 2019-09-13
Payer: COMMERCIAL

## 2019-09-13 VITALS
WEIGHT: 152 LBS | DIASTOLIC BLOOD PRESSURE: 62 MMHG | HEART RATE: 76 BPM | TEMPERATURE: 98.8 F | RESPIRATION RATE: 18 BRPM | BODY MASS INDEX: 23.11 KG/M2 | SYSTOLIC BLOOD PRESSURE: 100 MMHG

## 2019-09-13 DIAGNOSIS — N89.8 VAGINAL DISCHARGE: ICD-10-CM

## 2019-09-13 DIAGNOSIS — B96.89 BV (BACTERIAL VAGINOSIS): ICD-10-CM

## 2019-09-13 DIAGNOSIS — N76.0 BV (BACTERIAL VAGINOSIS): ICD-10-CM

## 2019-09-13 DIAGNOSIS — B37.31 CANDIDIASIS OF VAGINA: Primary | ICD-10-CM

## 2019-09-13 DIAGNOSIS — Z11.3 SCREEN FOR STD (SEXUALLY TRANSMITTED DISEASE): ICD-10-CM

## 2019-09-13 LAB
SPECIMEN SOURCE: ABNORMAL
WET PREP SPEC: ABNORMAL

## 2019-09-13 PROCEDURE — 87210 SMEAR WET MOUNT SALINE/INK: CPT | Performed by: NURSE PRACTITIONER

## 2019-09-13 PROCEDURE — 99214 OFFICE O/P EST MOD 30 MIN: CPT | Performed by: NURSE PRACTITIONER

## 2019-09-13 PROCEDURE — 87491 CHLMYD TRACH DNA AMP PROBE: CPT | Performed by: NURSE PRACTITIONER

## 2019-09-13 PROCEDURE — 87591 N.GONORRHOEAE DNA AMP PROB: CPT | Performed by: NURSE PRACTITIONER

## 2019-09-13 RX ORDER — FLUCONAZOLE 150 MG/1
150 TABLET ORAL ONCE
Qty: 1 TABLET | Refills: 0 | Status: SHIPPED | OUTPATIENT
Start: 2019-09-13 | End: 2020-03-11

## 2019-09-13 RX ORDER — METRONIDAZOLE 500 MG/1
500 TABLET ORAL 2 TIMES DAILY
Qty: 14 TABLET | Refills: 0 | Status: SHIPPED | OUTPATIENT
Start: 2019-09-13 | End: 2020-03-11

## 2019-09-13 ASSESSMENT — PAIN SCALES - GENERAL: PAINLEVEL: MILD PAIN (3)

## 2019-09-13 NOTE — PATIENT INSTRUCTIONS
Specimens collected today and I will call you with the results.    Please call or return to clinic for any questions, concerns, or symptoms that are not improving or becoming worse.    Naima Mendoza, CNP

## 2019-09-14 ASSESSMENT — ASTHMA QUESTIONNAIRES: ACT_TOTALSCORE: 25

## 2019-10-07 ENCOUNTER — NURSE TRIAGE (OUTPATIENT)
Dept: FAMILY MEDICINE | Facility: CLINIC | Age: 19
End: 2019-10-07

## 2019-10-07 NOTE — TELEPHONE ENCOUNTER
"Spoke to patient's Mom. Patient is at school and mom is only able to answer some questions.    Mom says patient thinks the vaginitis is gone. States patient had four days without bleeding last week, but bleeding started again on Saturday and has continued every day since then. States maybe her Nexplanon implant \"got messed up.\"       1. AMOUNT: \"Describe the bleeding that you are having.\"     - SPOTTING: spotting, or pinkish / brownish mucous discharge; does not fill panti-liner or pad     - MILD:  less than 1 pad / hour; less than patient's usual menstrual bleeding    - MODERATE: 1-2 pads / hour; 1 menstrual cup every 6 hours; small-medium blood clots (e.g., pea, grape, small coin)    - SEVERE: soaking 2 or more pads/hour for 2 or more hours; 1 menstrual cup every 2 hours; bleeding not contained by pads or continuous red blood from vagina; large blood clots (e.g., golf ball, large coin)       Mild, consistent, dark red to bright red.    2. ONSET: \"When did the bleeding begin?\" \"Is it continuing now?\"      Saturday, still having.     3. MENSTRUAL PERIOD: \"When was the last normal menstrual period?\" \"How is this different than your period?\"      Unsure    4. REGULARITY: \"How regular are your periods?\"      Unsure    5. ABDOMINAL PAIN: \"Do you have any pain?\" \"How bad is the pain?\"  (e.g., Scale 1-10; mild, moderate, or severe)    - MILD (1-3): doesn't interfere with normal activities, abdomen soft and not tender to touch     - MODERATE (4-7): interferes with normal activities or awakens from sleep, tender to touch     - SEVERE (8-10): excruciating pain, doubled over, unable to do any normal activities       Unsure    6. PREGNANCY: \"Could you be pregnant?\" \"Are you sexually active?\" \"Did you recently give birth?\"      No    7. BREASTFEEDING: \"Are you breastfeeding?\"      No    8. HORMONES: \"Are you taking any hormone medications, prescription or OTC?\" (e.g., birth control pills, estrogen)      Nexplanon    9. BLOOD " "THINNERS: \"Do you take any blood thinners?\" (e.g., Coumadin/warfarin, Pradaxa/dabigatran, aspirin)      No    10. CAUSE: \"What do you think is causing the bleeding?\" (e.g., recent gyn surgery, recent gyn procedure; known bleeding disorder, cervical cancer, polycystic ovarian disease, fibroids)          Implant, but unsure    11. HEMODYNAMIC STATUS: \"Are you weak or feeling lightheaded?\" If so, ask: \"Can you stand and walk normally?\"         Mom unable to answer this question    12. OTHER SYMPTOMS: \"What other symptoms are you having with the bleeding?\" (e.g., passed tissue, vaginal discharge, fever, menstrual-type cramps)        Mom unable to answer this question    RN advised patient call back to collect accurate information. Mom will pass message along to daughter.     Will huddle with provider for further advice.     Christina Morgan RN BSN         Additional Information    Negative: SEVERE vaginal bleeding (e.g., continuous red blood from vagina, or large blood clots) and very weak (can't stand)    Negative: Passed out (i.e., fainted, collapsed and was not responding)    Negative: Difficult to awaken or acting confused (e.g., disoriented, slurred speech)    Negative: Shock suspected (e.g., cold/pale/clammy skin, too weak to stand, low BP, rapid pulse)    Negative: Sounds like a life-threatening emergency to the triager    Negative: Pregnant > 20 weeks (5 months or more)    Negative: Pregnant < 20 weeks (less than 5 months)    Negative: Postpartum < 1 month since delivery ('Did you recently give birth?')    Negative: Vaginal discharge is the main symptom and bleeding is slight    Negative: SEVERE vaginal bleeding (i.e., soaking 2 pads or tampons per hour and present 2 or more hours; 1 menstrual cup every 2 hours)    Negative: MODERATE vaginal bleeding (i.e., soaking pad or tampon per hour and present > 6 hours; 1 menstrual cup every 6 hours)    Negative: Pale skin (pallor) of new onset or worsening    Negative: " Constant abdominal pain lasting > 2 hours    Negative: Patient sounds very sick or weak to the triager    Negative: SEVERE abdominal pain (e.g., excruciating)    Negative: SEVERE dizziness (e.g., unable to stand, requires support to walk, feels like passing out now)    Negative: Taking Coumadin (warfarin) or other strong blood thinner, or known bleeding disorder (e.g., thrombocytopenia)    Negative: Skin bruises or nosebleed and not caused by an injury    Negative: Bleeding/spotting after procedure (e.g., biopsy) or pelvic examination (e.g., pap smear) that persists > 3 days    Protocols used: VAGINAL BLEEDING - [id number]

## 2019-10-07 NOTE — TELEPHONE ENCOUNTER
Reason for call:  Patient reporting a symptom    Symptom or request: ongoing vaginal bleeding    Duration (how long have symptoms been present): few months off and on    Have you been treated for this before? Yes    Additional comments: Patient had bee seen for this issue and she said it stopped for 2 days last week, but has since returned and bleeding will not go away. Scheduled patient with Greil on 11/1, but mother wants DA to be aware that patient is still having issues with bleeding. Mother Anahi called clinic, she would like the call back, C2C on file.     Phone Number patient can be reached at:  [phone]    Best Time:  any    Can we leave a detailed message on this number:  YES    Call taken on 10/7/2019 at 7:52 AM by Isela Reyes

## 2019-10-08 NOTE — TELEPHONE ENCOUNTER
Patient made an appointment to be seen sooner:    Next 5 appointments (look out 90 days)    Oct 11, 2019  4:00 PM CDT  Office Visit with Erica Leon DO  [facility] [address] [facility] [address]  [phone]        Christina Morgan RN BSN

## 2019-10-11 ENCOUNTER — OFFICE VISIT (OUTPATIENT)
Dept: OBGYN | Facility: OTHER | Age: 19
End: 2019-10-11
Payer: COMMERCIAL

## 2019-10-11 VITALS
BODY MASS INDEX: 23.42 KG/M2 | DIASTOLIC BLOOD PRESSURE: 82 MMHG | HEART RATE: 60 BPM | WEIGHT: 154 LBS | SYSTOLIC BLOOD PRESSURE: 120 MMHG

## 2019-10-11 DIAGNOSIS — Z97.5 NEXPLANON IN PLACE: ICD-10-CM

## 2019-10-11 DIAGNOSIS — N92.1 METRORRHAGIA: Primary | ICD-10-CM

## 2019-10-11 PROCEDURE — 99203 OFFICE O/P NEW LOW 30 MIN: CPT | Performed by: OBSTETRICS & GYNECOLOGY

## 2019-10-11 RX ORDER — ESTRADIOL 0.5 MG/1
0.5 TABLET ORAL DAILY
Qty: 30 TABLET | Refills: 0 | Status: SHIPPED | OUTPATIENT
Start: 2019-10-11 | End: 2020-05-06

## 2019-10-11 NOTE — PATIENT INSTRUCTIONS
If you have any questions regarding your visit, Please contact your care team.    Women [facility] CLINIC HOURS TELEPHONE NUMBER   Erica Leon M.D.    Kermit Francisco           Monday-[facility]  7:00 am - 5 pm Monday's Tuesday- [facility]  8:00am- 5 pm  Wednesday- Off  Thursday- Off Surgery  Friday-Am [place], and [facility]  [place] 8:00-11:30 AM  [place] 1:00-5:00 PM [facility]  [address] [place] [address]  [phone] ask for [facility]    Imaging [phone]    [facility]  [address] [place], [place] [id number] [phone]  Imaging [phone]    [facility]  [address] [place], [place] [id number] [phone]  Imaging Scheduling - [phone]     Urgent Care locations:    [facility] Saturday and Sunday   9 am - 5 pm    Monday-Friday   12 pm - 8 pm  Saturday and Sunday   9 am - 5 pm   [phone]       If you need a medication refill, please contact your pharmacy. Please allow 3 business days for your refill to be completed.  As always, Thank you for trusting us with your healthcare needs!

## 2019-10-11 NOTE — PROGRESS NOTES
Subjective  19 year old non-pregnant female presents today complaining of metrorrhagia.  Patient had the Nexplanon placed April of last year.  Patient started to have vaginal itching and spotting in June.  Before this episode she had no vaginal bleeding or spotting.  She was dx in June with Chlamydia at [facility].  She was given treatment for this and itching went away.  The vaginal bleeding persisted and she is still having this.  During the summer the vaginal bleeding was very light.  She did a Boot camp for 5 weeks and only showered 5 times.  The blood was a browinsh dark color.  She had a wet prep done in Sept which showed yeast and bacterial vaginosis.  She was treated.  She denies any vaginal itching, discharge, or odor.  The vaginal bleeding is light but is now dark red.  No significant medical history.  Patient has not been sexually active recently.   We discussed treatment options.  I recommended trying Estrogen for a month to see if that helps.  She really likes the Nexplanon and does not want it removed yet.          ROS: 10 point ROS neg other than the symptoms noted above in the HPI.  Past Medical History:   Diagnosis Date     Asthma, mild persistent      Concussion 1/2012    Impact test 3/12     Mononucleosis 06/2019     Past Surgical History:   Procedure Laterality Date     ARTHROSCOPIC RECONSTRUCTION ANTERIOR CRUCIATE LIGAMENT  8/19/13    left     ESOPHAGOSCOPY, GASTROSCOPY, DUODENOSCOPY (EGD), COMBINED N/A 12/2/2016    Procedure: COMBINED ESOPHAGOSCOPY, GASTROSCOPY, DUODENOSCOPY (EGD), BIOPSY SINGLE OR MULTIPLE;  Surgeon: Laurie Pride MD;  Location: [facility]      HC TOOTH EXTRACTION W/FORCEP       Family History   Problem Relation Age of Onset     Diabetes Mother         gestational      GERD Mother      Gallbladder Disease Mother      Diabetes Maternal Grandmother      Cancer Maternal Grandmother         ovarian and uterine     Hypertension Father      Lipids  Father      Hypertension Paternal Grandfather      Family History Negative No family hx of      Asthma No family hx of         Eosinophilic esophagitis.     C.A.D. No family hx of      Breast Cancer No family hx of      Cerebrovascular Disease No family hx of      Cancer - colorectal No family hx of      Prostate Cancer No family hx of      Alcohol/Drug No family hx of      Allergies No family hx of      Alzheimer Disease No family hx of      Anesthesia Reaction No family hx of      Arthritis No family hx of      Blood Disease No family hx of      Cardiovascular No family hx of      Circulatory No family hx of      Congenital Anomalies No family hx of      Connective Tissue Disorder No family hx of      Depression No family hx of      Endocrine Disease No family hx of      Eye Disorder No family hx of      Genetic Disorder No family hx of      Gastrointestinal Disease No family hx of      Genitourinary Problems No family hx of      Gynecology No family hx of      Musculoskeletal Disorder No family hx of      Anxiety Disorder No family hx of      Mental Illness No family hx of      Substance Abuse No family hx of      Colon Cancer No family hx of      Other Cancer No family hx of      Thyroid Disease No family hx of      Obesity No family hx of      Osteoporosis No family hx of      Unknown/Adopted No family hx of      Hyperlipidemia No family hx of      Coronary Artery Disease No family hx of      Other - See Comments No family hx of      Social History     Tobacco Use     Smoking status: Never Smoker     Smokeless tobacco: Never Used     Tobacco comment: no smokers at home   Substance Use Topics     Alcohol use: No     Comment: none          Objective  Vitals: /82   Pulse 60   Wt 69.9 kg (154 lb)   LMP 06/04/2019 (Approximate)   BMI 23.42 kg/m    BMI= Body mass index is 23.42 kg/m .    General appearance=well developed, well-nourished female  Gait=normal  Psych=mood is stable, alert and oriented  x3  HEENT=mucous membranes moist  Skin=no rashes or lesions seen,normal turgor   Neck=overall appearance is normal  Thyroid=no enlargement      Assessment  1.)  Metrorrhagia      Plan  1.)  Estrace ordered  2.)  Follow-up in 2 months or as needed         30 minutes was spent face to face with the patient today discussing her history, diagnosis, and follow-up plan as noted above.  Over 50% of the visit was spent in counseling and coordination of care.        Nursing notes read and reviewed    Erica Leon DO

## 2020-01-01 NOTE — Clinical Note
Procedure/Surgery Information   [facility]    Circumcision Procedure Note  Date of Service (when I performed the procedure): 2020     Indication: parental preference    Consent: Informed consent was obtained from the parent(s), see scanned form.      Time Out:                        Right patient: Yes      Right body part: Yes      Right procedure Yes  Anesthesia:    Dorsal nerve block - 1% Lidocaine without epinephrine was infiltrated with a total of 0.9 cc    Pre-procedure:   The area was prepped with betadine, then draped in a sterile fashion. Sterile gloves were worn at all times during the procedure.    Procedure:   Gomco 1.3 device routine circumcision    Complications:   None at this time    Barbie Cooper MD   [facility].

## 2020-03-02 ENCOUNTER — HEALTH MAINTENANCE LETTER (OUTPATIENT)
Age: 20
End: 2020-03-02

## 2020-03-09 NOTE — PROGRESS NOTES
"Subjective     Arlen Wliliam is a 19 year old female who presents to clinic today for the following health issues:    History of Present Illness        She eats 4 or more servings of fruits and vegetables daily.She consumes 0 sweetened beverage(s) daily.She exercises with enough effort to increase her heart rate 60 or more minutes per day.  She exercises with enough effort to increase her heart rate 5 days per week.   She is taking medications regularly.     Answers for HPI/ROS submitted by the patient on 3/11/2020   Chronic problems general questions HPI Form  If you checked off any problems, how difficult have these problems made it for you to do your work, take care of things at home, or get along with other people?: Not difficult at all  PHQ9 TOTAL SCORE: 0  JAMAAL 7 TOTAL SCORE: 0      Vaginal Symptoms  Onset: 2-3 months     Description:  Vaginal Discharge: white creamy and yellow. Red/Pink colored discharge   Itching (Pruritis): YES  Burning sensation:  YES  Odor: YES    Accompanying Signs & Symptoms:  Pain with Urination: no   Abdominal Pain: no   Fever: no     History:   Sexually active: YES Not currently   New Partner: YES used protection   Possibility of Pregnancy:  No    Precipitating factors:   Recent Antibiotic Use: no     Alleviating factors:      Therapies Tried and outcome: Has been using monistat on day and a topical cream.     Patient states that the color of the discharge is like a \"pink/purple\" just recently. Patient also mentions that there is occasional swelling with her vaginal area. She includes that there is a \"sharp feeling\" inside her vaginal area.    Reviewed and updated as needed this visit by Provider         Review of Systems   ROS COMP: Constitutional, HEENT, cardiovascular, pulmonary, GI, , musculoskeletal, neuro, skin, endocrine and psych systems are negative, except as otherwise noted.      This document serves as a record of the services and decisions personally performed and " "made by Nuria Farr MD. It was created on her behalf by Vita Tinajero, a trained medical scribe. The creation of this document is based on the provider's statements to the medical scribe.  Vita Tinajero 4:52 PM March 11, 2020     Objective    /60 (BP Location: Right arm, Patient Position: Chair, Cuff Size: Adult Regular)   Pulse 70   Temp 98.6  F (37  C) (Temporal)   Resp 16   Ht 1.727 m (5' 8\")   Wt 71.2 kg (157 lb)   LMP 02/19/2020   SpO2 97%   Breastfeeding No   BMI 23.87 kg/m    Body mass index is 23.87 kg/m .  Physical Exam   GENERAL: healthy, alert and no distress   (female): normal female external genitalia, normal urethral meatus , vaginal mucosa pink, moist, well rugated, normal cervix, adnexae, and uterus without masses. and just above the cervix, there is a red area that may have been recently healed but seems to have blood from the cervix.  SKIN: no suspicious lesions or rashes  PSYCH: mentation appears normal, affect normal/bright        Assessment & Plan       ICD-10-CM    1. Acute vaginitis  N76.0 Wet prep     NEISSERIA GONORRHOEA PCR     CHLAMYDIA TRACHOMATIS PCR   2. Trichimoniasis  A59.9 metroNIDAZOLE (FLAGYL) 500 MG tablet     Patient tested positive for trich. Treated and offered other STD testing. She states she had HIV testing with the [employer]'s blood STD testing just a couple of weeks ago. So would just test for vaginal symptoms. Given cup for gc/ch testing, but overfilled. Staff contacting to get a new sample vs vaginal self collect.  As for her bloody discharge, we discussed the irritation seen at the cervical area may be due to trich, so treat first. But if symptoms persist, we can consider removing the nexplanon and placing a mirena which is the most successful with stopping menses. Though she has some risks due to recent trich infections and would want to make sure no STDs and ideally low risk contacts in the future. She has broken up with this partner already.    See Patient " Instructions    Return in about 1 week (around 3/18/2020) for if not improved.    The information in this document, created by the medical scribe for me, accurately reflects the services I personally performed and the decisions made by me. I have reviewed and approved this document for accuracy prior to leaving the patient care area.  March 11, 2020 5:05 PM     Nuria Farr MD, MD  [facility]

## 2020-03-11 ENCOUNTER — PRENATAL OFFICE VISIT (OUTPATIENT)
Dept: FAMILY MEDICINE | Facility: OTHER | Age: 20
End: 2020-03-11
Payer: COMMERCIAL

## 2020-03-11 ENCOUNTER — TELEPHONE (OUTPATIENT)
Dept: FAMILY MEDICINE | Facility: OTHER | Age: 20
End: 2020-03-11

## 2020-03-11 VITALS
SYSTOLIC BLOOD PRESSURE: 118 MMHG | WEIGHT: 157 LBS | HEART RATE: 70 BPM | RESPIRATION RATE: 16 BRPM | HEIGHT: 68 IN | BODY MASS INDEX: 23.79 KG/M2 | TEMPERATURE: 98.6 F | OXYGEN SATURATION: 97 % | DIASTOLIC BLOOD PRESSURE: 60 MMHG

## 2020-03-11 DIAGNOSIS — A59.9 TRICHIMONIASIS: ICD-10-CM

## 2020-03-11 DIAGNOSIS — N76.0 ACUTE VAGINITIS: Primary | ICD-10-CM

## 2020-03-11 LAB
SPECIMEN SOURCE: ABNORMAL
WET PREP SPEC: ABNORMAL

## 2020-03-11 PROCEDURE — 87210 SMEAR WET MOUNT SALINE/INK: CPT | Performed by: FAMILY MEDICINE

## 2020-03-11 PROCEDURE — 99213 OFFICE O/P EST LOW 20 MIN: CPT | Performed by: FAMILY MEDICINE

## 2020-03-11 RX ORDER — METRONIDAZOLE 500 MG/1
2000 TABLET ORAL ONCE
Qty: 4 TABLET | Refills: 0 | Status: SHIPPED | OUTPATIENT
Start: 2020-03-11 | End: 2020-05-06

## 2020-03-11 ASSESSMENT — ANXIETY QUESTIONNAIRES
7. FEELING AFRAID AS IF SOMETHING AWFUL MIGHT HAPPEN: NOT AT ALL
6. BECOMING EASILY ANNOYED OR IRRITABLE: NOT AT ALL
1. FEELING NERVOUS, ANXIOUS, OR ON EDGE: NOT AT ALL
7. FEELING AFRAID AS IF SOMETHING AWFUL MIGHT HAPPEN: NOT AT ALL
2. NOT BEING ABLE TO STOP OR CONTROL WORRYING: NOT AT ALL
5. BEING SO RESTLESS THAT IT IS HARD TO SIT STILL: NOT AT ALL
GAD7 TOTAL SCORE: 0
3. WORRYING TOO MUCH ABOUT DIFFERENT THINGS: NOT AT ALL
4. TROUBLE RELAXING: NOT AT ALL

## 2020-03-11 ASSESSMENT — PATIENT HEALTH QUESTIONNAIRE - PHQ9
SUM OF ALL RESPONSES TO PHQ QUESTIONS 1-9: 0
SUM OF ALL RESPONSES TO PHQ QUESTIONS 1-9: 0
10. IF YOU CHECKED OFF ANY PROBLEMS, HOW DIFFICULT HAVE THESE PROBLEMS MADE IT FOR YOU TO DO YOUR WORK, TAKE CARE OF THINGS AT HOME, OR GET ALONG WITH OTHER PEOPLE: NOT DIFFICULT AT ALL

## 2020-03-11 ASSESSMENT — MIFFLIN-ST. JEOR: SCORE: 1530.65

## 2020-03-11 NOTE — PATIENT INSTRUCTIONS
Patient Education     Trichomonas Vaginal Infection (Trichomoniasis)    Trichomonas vaginal infection is often called  trich.  It is caused by a parasite that is passed during sex. This makes trich a sexually transmitted disease (STD). Both men and women can get trich, but it is more common in women.  Most people who have trich don t have any symptoms at first. If symptoms do occur, they may take weeks or months to develop.  Symptoms in women can include:    Thin discharge from the vagina that may smell bad and be clear, white, gray, green, or yellow in color    Itching, burning, redness, or soreness in or around the vagina    Pain in the lower belly    Frequent urination or pain and burning during urination    Pain during sex  Symptoms in men are not very common. Men may have trich and pass it to women during sex without knowing they were ever infected.  Trich is most often treated with antibiotics. Without treatment, trich can increase the risk of more serious health problems such as:    Pelvic inflammatory disease (PID)     delivery (giving birth to a baby early if you re pregnant)    HIV and certain other sexually transmitted diseases (STDs)  Home care    Take the antibiotics you re prescribed exactly as directed. Finish all of the medicine, even if your symptoms go away.    Avoid drinking alcohol until you re done with your treatment.    Tell any partners you have sex with that you have trich. They will need be tested for trich and possibly treated as well.    Avoid having sex until you and any partners you have sex with are confirmed to no longer have trich.  Prevention  The only way to avoid getting trich or any other STD is to avoid having sex. If you choose to have sex, then take steps to lower your health risks:    Use condoms when having sex.    Limit the number of partners you have sex with.    Get tested regularly for STDs. Ask any partner you have sex with to do the same.    Don t have sex with  anyone who has symptoms that may be due to an STD.  Follow-up care  Follow up with your healthcare provider, or as advised. Testing will likely be done to ensure that the infection has cleared.  When to seek medical advice  Call your healthcare provider right away if:    You have a fever of 100.4 F (38 C) or higher, or as directed by your provider.    Your symptoms worsen, or they don t go away even after completing your treatment.    You have new pain in the lower belly or pelvic region.    You have side effects that bother you or a reaction to the medicine you re taking.    You or any partners you have sex with have new symptoms, such as a rash, joint pain, or sores.  Date Last Reviewed: 10/1/2017    4832-2874 [employer]. [address] [place] [address], [place] [address]. All rights reserved. This information is not intended as a substitute for professional medical care. Always follow your healthcare professional's instructions.

## 2020-03-11 NOTE — TELEPHONE ENCOUNTER
Patient was seen in clinic on 3-11 with AE. Patient needs to repeat urine or do a self swab for Chlamydia Screen. Patient left sample but sample had to be thrown as to much urine was collected. Please call patient to see which she prefers.

## 2020-03-12 DIAGNOSIS — Z11.3 SCREEN FOR STD (SEXUALLY TRANSMITTED DISEASE): ICD-10-CM

## 2020-03-12 ASSESSMENT — PATIENT HEALTH QUESTIONNAIRE - PHQ9: SUM OF ALL RESPONSES TO PHQ QUESTIONS 1-9: 0

## 2020-03-12 ASSESSMENT — ANXIETY QUESTIONNAIRES: GAD7 TOTAL SCORE: 0

## 2020-03-12 ASSESSMENT — ASTHMA QUESTIONNAIRES: ACT_TOTALSCORE: 25

## 2020-03-13 PROCEDURE — 87591 N.GONORRHOEAE DNA AMP PROB: CPT | Performed by: FAMILY MEDICINE

## 2020-03-13 PROCEDURE — 87491 CHLMYD TRACH DNA AMP PROBE: CPT | Performed by: FAMILY MEDICINE

## 2020-03-16 LAB
C TRACH DNA SPEC QL NAA+PROBE: ABNORMAL
N GONORRHOEA DNA SPEC QL NAA+PROBE: ABNORMAL
SPECIMEN SOURCE: ABNORMAL
SPECIMEN SOURCE: ABNORMAL

## 2020-03-16 NOTE — RESULT ENCOUNTER NOTE
Arlne, your results were all normal.  Please let me know if you have any questions.  Nuria Farr MD

## 2020-05-04 ENCOUNTER — MYC MEDICAL ADVICE (OUTPATIENT)
Dept: FAMILY MEDICINE | Facility: OTHER | Age: 20
End: 2020-05-04

## 2020-05-06 ENCOUNTER — VIRTUAL VISIT (OUTPATIENT)
Dept: FAMILY MEDICINE | Facility: OTHER | Age: 20
End: 2020-05-06
Payer: COMMERCIAL

## 2020-05-06 DIAGNOSIS — B96.89 BV (BACTERIAL VAGINOSIS): Primary | ICD-10-CM

## 2020-05-06 DIAGNOSIS — N76.0 BV (BACTERIAL VAGINOSIS): Primary | ICD-10-CM

## 2020-05-06 PROCEDURE — 99213 OFFICE O/P EST LOW 20 MIN: CPT | Mod: TEL | Performed by: FAMILY MEDICINE

## 2020-05-06 RX ORDER — LACTOBACILLUS RHAMNOSUS GG 10B CELL
1 CAPSULE ORAL 2 TIMES DAILY
Qty: 90 CAPSULE | Refills: 1 | Status: SHIPPED | OUTPATIENT
Start: 2020-05-06 | End: 2021-02-10

## 2020-05-06 RX ORDER — METRONIDAZOLE 500 MG/1
500 TABLET ORAL 2 TIMES DAILY
Qty: 14 TABLET | Refills: 0 | Status: SHIPPED | OUTPATIENT
Start: 2020-05-06 | End: 2020-08-11

## 2020-05-06 NOTE — PROGRESS NOTES
"Arlen William is a 19 year old female who is being evaluated via a billable telephone visit.      The patient has been notified of following:     \"This telephone visit will be conducted via a call between you and your physician/provider. We have found that certain health care needs can be provided without the need for a physical exam.  This service lets us provide the care you need with a short phone conversation.  If a prescription is necessary we can send it directly to your pharmacy.  If lab work is needed we can place an order for that and you can then stop by our lab to have the test done at a later time.    Telephone visits are billed at different rates depending on your insurance coverage. During this emergency period, for some insurers they may be billed the same as an in-person visit.  Please reach out to your insurance provider with any questions.    If during the course of the call the physician/provider feels a telephone visit is not appropriate, you will not be charged for this service.\"    Patient has given verbal consent for Telephone visit?  Yes    What phone number would you like to be contacted at? [phone]    How would you like to obtain your AVS? Clydet    Thai     Arlen William is a 19 year old female who presents to clinic today for the following health issues:    HPI  Vaginal Symptoms  Onset: one month     Description:  Vaginal Discharge: yellow discharge some blood   Itching (Pruritis): YES  Burning sensation:  no   Odor: YES    Accompanying Signs & Symptoms:  Pain with Urination: no   Abdominal Pain: YES some cramping   Fever: no     History:   Sexually active: no Not currently   New Partner: YES before this started   Possibility of Pregnancy:  No    Precipitating factors:   Recent Antibiotic Use: no     Alleviating factors:      Therapies Tried and outcome: Was treated with ABX for Trichomoniasis also has been using monistat for itching.     "     -------------------------------------    Patient Active Problem List   Diagnosis     Environmental allergies     Mild persistent asthma     Scoliosis     Leg length discrepancy     Torn ACL     Chronic rhinitis     Tinea versicolor     Dysmenorrhea     Other acne     Chest wall pain     Oropharyngeal dysphagia     Exercise-induced asthma     Nexplanon in place     Past Surgical History:   Procedure Laterality Date     ARTHROSCOPIC RECONSTRUCTION ANTERIOR CRUCIATE LIGAMENT  8/19/13    left     ESOPHAGOSCOPY, GASTROSCOPY, DUODENOSCOPY (EGD), COMBINED N/A 12/2/2016    Procedure: COMBINED ESOPHAGOSCOPY, GASTROSCOPY, DUODENOSCOPY (EGD), BIOPSY SINGLE OR MULTIPLE;  Surgeon: Laurie Pride MD;  Location:  PEDS SED[facility]      HC TOOTH EXTRACTION W/FORCEP         Social History     Tobacco Use     Smoking status: Never Smoker     Smokeless tobacco: Never Used     Tobacco comment: no smokers at home   Substance Use Topics     Alcohol use: No     Comment: none      Family History   Problem Relation Age of Onset     Diabetes Mother         gestational      GERD Mother      Gallbladder Disease Mother      Diabetes Maternal Grandmother      Cancer Maternal Grandmother         ovarian and uterine     Hypertension Father      Lipids Father      Hypertension Paternal Grandfather      Family History Negative No family hx of      Asthma No family hx of         Eosinophilic esophagitis.     C.A.D. No family hx of      Breast Cancer No family hx of      Cerebrovascular Disease No family hx of      Cancer - colorectal No family hx of      Prostate Cancer No family hx of      Alcohol/Drug No family hx of      Allergies No family hx of      Alzheimer Disease No family hx of      Anesthesia Reaction No family hx of      Arthritis No family hx of      Blood Disease No family hx of      Cardiovascular No family hx of      Circulatory No family hx of      Congenital Anomalies No family hx of      Connective Tissue  Disorder No family hx of      Depression No family hx of      Endocrine Disease No family hx of      Eye Disorder No family hx of      Genetic Disorder No family hx of      Gastrointestinal Disease No family hx of      Genitourinary Problems No family hx of      Gynecology No family hx of      Musculoskeletal Disorder No family hx of      Anxiety Disorder No family hx of      Mental Illness No family hx of      Substance Abuse No family hx of      Colon Cancer No family hx of      Other Cancer No family hx of      Thyroid Disease No family hx of      Obesity No family hx of      Osteoporosis No family hx of      Unknown/Adopted No family hx of      Hyperlipidemia No family hx of      Coronary Artery Disease No family hx of      Other - See Comments No family hx of            Reviewed and updated as needed this visit by Provider         Review of Systems   ROS COMP: Constitutional, HEENT, cardiovascular, pulmonary, GI, , musculoskeletal, neuro, skin, endocrine and psych systems are negative, except as otherwise noted.       Objective   Reported vitals:  There were no vitals taken for this visit.   alert and no distress  PSYCH: Alert and oriented times 3; coherent speech, normal   rate and volume, able to articulate logical thoughts, able   to abstract reason, no tangential thoughts, no hallucinations   or delusions  Her affect is normal  RESP: No cough, no audible wheezing, able to talk in full sentences  Remainder of exam unable to be completed due to telephone visits    Previous h/o trich and did not improve symptoms.        Assessment/Plan:  1. BV (bacterial vaginosis)        Had self treated for yeast prior to our last visit and then took the 1 time course for trich and did well with taking the meds and has not had sex again since. But really did n't feel better after the treatment. Reviewed that we can still see her and swab to double check, but as she did nto feel better and described BV as well as the longer  course for trich is the same. We chose to use the longer metronidazole course this time. RTC for swab if not improving with this.    No follow-ups on file.      Phone call duration:  11 minutes    Nuria Farr MD, MD

## 2020-08-07 NOTE — PROGRESS NOTES
"  Nexplanon Removal:     Is a pregnancy test required: No.  Was a consent obtained?  {Yes/No:061991::\"Yes\"}    Arlen William is here for removal of etonogestrel implant Nexplanon/Implanon    Indication: ***      Preoperative Diagnosis: etonogestrel implant  Postoperative Diagnosis: etonogestrel implant removed    Technique: On the {left/right:585308} arm  Skin prep {BETADINE/TECHNICARE:455369664}  Anesthesia {LIDOCAINE:204310079}  Procedure: Small incision (<5mm) was made at distal end of palpable implant, curved hemostat or mosquito forceps was used to isolate the implant and bring it to the incision, the fibrous capsule containing the implant  was incised and the Implant was removed intact.    Lot # ***  Exp: ***    EBL: minimal  Complications:  {YES / NO SURGERY:150068}  Tolerance:  Pt tolerated procedure well and was in stable condition.   Dressing:    A pressure bandage was placed for the next 12-24 hours.    Contraception was discussed and patient chose the following method {CONTRACEPTION:706}      Follow up: Pt was instructed to call if bleeding, severe pain or foul smell.     Nuria Farr MD, MD  "

## 2020-08-12 ENCOUNTER — OFFICE VISIT (OUTPATIENT)
Dept: FAMILY MEDICINE | Facility: OTHER | Age: 20
End: 2020-08-12
Payer: COMMERCIAL

## 2020-08-12 VITALS
HEART RATE: 54 BPM | TEMPERATURE: 98.3 F | DIASTOLIC BLOOD PRESSURE: 78 MMHG | WEIGHT: 156 LBS | SYSTOLIC BLOOD PRESSURE: 121 MMHG | BODY MASS INDEX: 23.72 KG/M2

## 2020-08-12 DIAGNOSIS — Z11.3 SCREEN FOR STD (SEXUALLY TRANSMITTED DISEASE): ICD-10-CM

## 2020-08-12 DIAGNOSIS — R30.0 DYSURIA: ICD-10-CM

## 2020-08-12 DIAGNOSIS — Z97.5 BREAKTHROUGH BLEEDING ON NEXPLANON: ICD-10-CM

## 2020-08-12 DIAGNOSIS — N76.0 BV (BACTERIAL VAGINOSIS): ICD-10-CM

## 2020-08-12 DIAGNOSIS — N92.1 BREAKTHROUGH BLEEDING ON NEXPLANON: ICD-10-CM

## 2020-08-12 DIAGNOSIS — Z12.4 SCREENING FOR MALIGNANT NEOPLASM OF CERVIX: Primary | ICD-10-CM

## 2020-08-12 DIAGNOSIS — B96.89 BV (BACTERIAL VAGINOSIS): ICD-10-CM

## 2020-08-12 LAB
ALBUMIN UR-MCNC: NEGATIVE MG/DL
APPEARANCE UR: CLEAR
BILIRUB UR QL STRIP: NEGATIVE
COLOR UR AUTO: YELLOW
GLUCOSE UR STRIP-MCNC: NEGATIVE MG/DL
HGB UR QL STRIP: NEGATIVE
KETONES UR STRIP-MCNC: NEGATIVE MG/DL
LEUKOCYTE ESTERASE UR QL STRIP: NEGATIVE
NITRATE UR QL: NEGATIVE
PH UR STRIP: 7.5 PH (ref 5–7)
SOURCE: ABNORMAL
SP GR UR STRIP: 1.02 (ref 1–1.03)
SPECIMEN SOURCE: ABNORMAL
UROBILINOGEN UR STRIP-ACNC: 0.2 EU/DL (ref 0.2–1)
WET PREP SPEC: ABNORMAL

## 2020-08-12 PROCEDURE — 99213 OFFICE O/P EST LOW 20 MIN: CPT | Performed by: PHYSICIAN ASSISTANT

## 2020-08-12 PROCEDURE — G0145 SCR C/V CYTO,THINLAYER,RESCR: HCPCS | Performed by: PHYSICIAN ASSISTANT

## 2020-08-12 PROCEDURE — 87491 CHLMYD TRACH DNA AMP PROBE: CPT | Performed by: PHYSICIAN ASSISTANT

## 2020-08-12 PROCEDURE — 87210 SMEAR WET MOUNT SALINE/INK: CPT | Performed by: PHYSICIAN ASSISTANT

## 2020-08-12 PROCEDURE — 87591 N.GONORRHOEAE DNA AMP PROB: CPT | Performed by: PHYSICIAN ASSISTANT

## 2020-08-12 PROCEDURE — 81003 URINALYSIS AUTO W/O SCOPE: CPT | Performed by: PHYSICIAN ASSISTANT

## 2020-08-12 PROCEDURE — G0124 SCREEN C/V THIN LAYER BY MD: HCPCS | Performed by: PHYSICIAN ASSISTANT

## 2020-08-12 RX ORDER — METRONIDAZOLE 500 MG/1
500 TABLET ORAL 2 TIMES DAILY
Qty: 14 TABLET | Refills: 0 | Status: SHIPPED | OUTPATIENT
Start: 2020-08-12 | End: 2021-02-10

## 2020-08-12 RX ORDER — NORGESTIMATE AND ETHINYL ESTRADIOL 0.25-0.035
KIT ORAL
Qty: 112 TABLET | Refills: 0 | Status: SHIPPED | OUTPATIENT
Start: 2020-08-12 | End: 2021-02-10

## 2020-08-12 ASSESSMENT — PAIN SCALES - GENERAL: PAINLEVEL: NO PAIN (0)

## 2020-08-12 NOTE — LETTER
My Asthma Action Plan    Name: Arlen William   YOB: 2000  Date: 8/12/2020   My doctor: Humaira Richards PA-C   My clinic: [facility]        My Rescue Medicine:   Albuterol inhaler (Proair/Ventolin/Proventil HFA)  2-4 puffs EVERY 4 HOURS as needed. Use a spacer if recommended by your provider.   My Asthma Severity:   Intermittent / Exercise Induced  Know your asthma triggers: Patient is unaware of triggers             GREEN ZONE   Good Control    I feel good    No cough or wheeze    Can work, sleep and play without asthma symptoms       Take your asthma control medicine every day.     1. If exercise triggers your asthma, take your rescue medication    15 minutes before exercise or sports, and    During exercise if you have asthma symptoms  2. Spacer to use with inhaler: If you have a spacer, make sure to use it with your inhaler             YELLOW ZONE Getting Worse  I have ANY of these:    I do not feel good    Cough or wheeze    Chest feels tight    Wake up at night   1. Keep taking your Green Zone medications  2. Start taking your rescue medicine:    every 20 minutes for up to 1 hour. Then every 4 hours for 24-48 hours.  3. If you stay in the Yellow Zone for more than 12-24 hours, contact your doctor.  4. If you do not return to the Green Zone in 12-24 hours or you get worse, start taking your oral steroid medicine if prescribed by your provider.           RED ZONE Medical Alert - Get Help  I have ANY of these:    I feel awful    Medicine is not helping    Breathing getting harder    Trouble walking or talking    Nose opens wide to breathe       1. Take your rescue medicine NOW  2. If your provider has prescribed an oral steroid medicine, start taking it NOW  3. Call your doctor NOW  4. If you are still in the Red Zone after 20 minutes and you have not reached your doctor:    Take your rescue medicine again and    Call 911 or go to the emergency room right away    See your regular  doctor within 2 weeks of an Emergency Room or Urgent Care visit for follow-up treatment.          Annual Reminders:  Meet with Asthma Educator,  Flu Shot in the Fall, consider Pneumonia Vaccination for patients with asthma (aged 19 and older).    Pharmacy:    [facility] - [place], [place] - [address]  [id number] - [place], [place] - [address] [place] [address]    Electronically signed by Humaira Richards PA-C   Date: 08/12/20                    Asthma Triggers  How To Control Things That Make Your Asthma Worse    Triggers are things that make your asthma worse.  Look at the list below to help you find your triggers and   what you can do about them. You can help prevent asthma flare-ups by staying away from your triggers.      Trigger                                                          What you can do   Cigarette Smoke  Tobacco smoke can make asthma worse. Do not allow smoking in your home, car or around you.  Be sure no one smokes at a child s day care or school.  If you smoke, ask your health care provider for ways to help you quit.  Ask family members to quit too.  Ask your health care provider for a referral to Quit Plan to help you quit smoking, or call 1-048-085-PLAN.     Colds, Flu, Bronchitis  These are common triggers of asthma. Wash your hands often.  Don t touch your eyes, nose or mouth.  Get a flu shot every year.     Dust Mites  These are tiny bugs that live in cloth or carpet. They are too small to see. Wash sheets and blankets in hot water every week.   Encase pillows and mattress in dust mite proof covers.  Avoid having carpet if you can. If you have carpet, vacuum weekly.   Use a dust mask and HEPA vacuum.   Pollen and Outdoor Mold  Some people are allergic to trees, grass, or weed pollen, or molds. Try to keep your windows closed.  Limit time out doors when pollen count is high.   Ask you health care provider about taking medicine during allergy season.     Animal Dander  Some  people are allergic to skin flakes, urine or saliva from pets with fur or feathers. Keep pets with fur or feathers out of your home.    If you can t keep the pet outdoors, then keep the pet out of your bedroom.  Keep the bedroom door closed.  Keep pets off cloth furniture and away from stuffed toys.     Mice, Rats, and Cockroaches  Some people are allergic to the waste from these pests.   Cover food and garbage.  Clean up spills and food crumbs.  Store grease in the refrigerator.   Keep food out of the bedroom.   Indoor Mold  This can be a trigger if your home has high moisture. Fix leaking faucets, pipes, or other sources of water.   Clean moldy surfaces.  Dehumidify basement if it is damp and smelly.   Smoke, Strong Odors, and Sprays  These can reduce air quality. Stay away from strong odors and sprays, such as perfume, powder, hair spray, paints, smoke incense, paint, cleaning products, candles and new carpet.   Exercise or Sports  Some people with asthma have this trigger. Be active!  Ask your doctor about taking medicine before sports or exercise to prevent symptoms.    Warm up for 5-10 minutes before and after sports or exercise.     Other Triggers of Asthma  Cold air:  Cover your nose and mouth with a scarf.  Sometimes laughing or crying can be a trigger.  Some medicines and food can trigger asthma.

## 2020-08-13 ASSESSMENT — ASTHMA QUESTIONNAIRES: ACT_TOTALSCORE: 23

## 2020-08-13 NOTE — PROGRESS NOTES
Subjective     Arlen William is a 20 year old female who presents to clinic today for the following health issues:    HPI   Discuss contraception    Patient presents today to discuss contraceptive options. She current has the Nexplanon in place - has had this placed 10/2019. She reports at first it was really helpful at controlling her periods - she reports very rare bleeding and this was very light. She states in the last few months she has had prolonged periods that seem heavier than she was previously getting. She reports it occurred both times when she was gone on a drill weekend for the . She is not sure if this was stress related or something else. 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     She also is concerned she may have a vaginal infection or UTI. She reports vaginal irritation and pain with urination. She does have a history of BV. Has also been recently treated for trich. She does report a new partner since her last STD screening. She is also due for pap today.     Patient Active Problem List   Diagnosis     Environmental allergies     Mild persistent asthma     Scoliosis     Leg length discrepancy     Torn ACL     Chronic rhinitis     Tinea versicolor     Dysmenorrhea     Other acne     Chest wall pain     Oropharyngeal dysphagia     Exercise-induced asthma     Nexplanon in place     Past Surgical History:   Procedure Laterality Date     ARTHROSCOPIC RECONSTRUCTION ANTERIOR CRUCIATE LIGAMENT  8/19/13    left     ESOPHAGOSCOPY, GASTROSCOPY, DUODENOSCOPY (EGD), COMBINED N/A 12/2/2016    Procedure: COMBINED ESOPHAGOSCOPY, GASTROSCOPY, DUODENOSCOPY (EGD), BIOPSY SINGLE OR MULTIPLE;  Surgeon: Laurie Pride MD;  Location: [facility]      HC TOOTH EXTRACTION W/FORCEP         Social History     Tobacco Use     Smoking status: Never Smoker     Smokeless tobacco: Never Used     Tobacco comment: no  smokers at home   Substance Use Topics     Alcohol use: No     Comment: none      Family History   Problem Relation Age of Onset     Diabetes Mother         gestational      GERD Mother      Gallbladder Disease Mother      Diabetes Maternal Grandmother      Cancer Maternal Grandmother         ovarian and uterine     Hypertension Father      Lipids Father      Hypertension Paternal Grandfather      Family History Negative No family hx of      Asthma No family hx of         Eosinophilic esophagitis.     C.A.D. No family hx of      Breast Cancer No family hx of      Cerebrovascular Disease No family hx of      Cancer - colorectal No family hx of      Prostate Cancer No family hx of      Alcohol/Drug No family hx of      Allergies No family hx of      Alzheimer Disease No family hx of      Anesthesia Reaction No family hx of      Arthritis No family hx of      Blood Disease No family hx of      Cardiovascular No family hx of      Circulatory No family hx of      Congenital Anomalies No family hx of      Connective Tissue Disorder No family hx of      Depression No family hx of      Endocrine Disease No family hx of      Eye Disorder No family hx of      Genetic Disorder No family hx of      Gastrointestinal Disease No family hx of      Genitourinary Problems No family hx of      Gynecology No family hx of      Musculoskeletal Disorder No family hx of      Anxiety Disorder No family hx of      Mental Illness No family hx of      Substance Abuse No family hx of      Colon Cancer No family hx of      Other Cancer No family hx of      Thyroid Disease No family hx of      Obesity No family hx of      Osteoporosis No family hx of      Unknown/Adopted No family hx of      Hyperlipidemia No family hx of      Coronary Artery Disease No family hx of      Other - See Comments No family hx of          Current Outpatient Medications   Medication Sig Dispense Refill     metroNIDAZOLE (FLAGYL) 500 MG tablet Take 1 tablet (500 mg) by  mouth 2 times daily 14 tablet 0     norgestimate-ethinyl estradiol (SPRINTEC 28) 0.25-35 MG-MCG tablet Take active pills only for 12 weeks 112 tablet 0     Cholecalciferol (VITAMIN D) 1000 UNITS capsule Take 1 capsule by mouth daily.       Etonogestrel (NEXPLANON SC)        ketoconazole (NIZORAL) 2 % external shampoo Use topically in the shower to chest and arms. 120 mL 3     lactobacillus rhamnosus, GG, (CULTURELL) capsule Take 1 capsule by mouth 2 times daily 90 capsule 1     Multiple Vitamin (MULTIVITAMINS PO) Take 1 tablet by mouth daily.       No Known Allergies    Reviewed and updated as needed this visit by Provider  Tobacco  Allergies  Meds  Problems  Med Hx  Surg Hx  Fam Hx         Review of Systems   Constitutional, HEENT, cardiovascular, pulmonary, gi and gu systems are negative, except as otherwise noted.      Objective    /78   Pulse 54   Temp 98.3  F (36.8  C) (Temporal)   Wt 70.8 kg (156 lb)   BMI 23.72 kg/m    Body mass index is 23.72 kg/m .  Physical Exam   GENERAL: healthy, alert and no distress   (female): normal female external genitalia, normal urethral meatus, vaginal mucosa, normal cervix/adnexa/uterus without masses or discharge  SKIN: no suspicious lesions or rashes  PSYCH: mentation appears normal, affect normal/bright    Diagnostic Test Results:  Labs reviewed in Epic        Assessment & Plan     1. Screening for malignant neoplasm of cervix  - Pap imaged thin layer screen only - recommended age 21 - 24 years    2. Dysuria  UA negative for infection today.   - *UA reflex to Microscopic and Culture ([place] and [facility] (except [place] and [place])    3. Breakthrough bleeding on Nexplanon  We discussed it is not uncommon to have breakthrough bleeding with the Nexplanon. We discussed other options of contraception vs trial of 3 months addition of OCP's. Patient would like to try that option first and if bleeding not resolving she is considering an IUD before  possible deployment.   - norgestimate-ethinyl estradiol (SPRINTEC 28) 0.25-35 MG-MCG tablet; Take active pills only for 12 weeks  Dispense: 112 tablet; Refill: 0    4. Screen for STD (sexually transmitted disease)  - Wet prep  - NEISSERIA GONORRHOEA PCR  - CHLAMYDIA TRACHOMATIS PCR    5. BV (bacterial vaginosis)  - metroNIDAZOLE (FLAGYL) 500 MG tablet; Take 1 tablet (500 mg) by mouth 2 times daily  Dispense: 14 tablet; Refill: 0    The patient indicates understanding of these issues and agrees with the plan.    Humaira Richards PA-C  [facility]

## 2020-08-17 ENCOUNTER — PATIENT OUTREACH (OUTPATIENT)
Dept: FAMILY MEDICINE | Facility: OTHER | Age: 20
End: 2020-08-17

## 2020-08-17 LAB
COPATH REPORT: ABNORMAL
PAP: ABNORMAL

## 2020-08-17 NOTE — TELEPHONE ENCOUNTER
8/12/20 ASCUS Pap at 21 yo. Plan pap only in 1 year.   8/17/20 MyChart result note sent.    Sarah Beth Stevens, RN  Pap Tracking

## 2020-10-03 NOTE — PROGRESS NOTES
"Subjective     Arlen William is a 20 year old female who presents to clinic today for the following health issues:    HPI         Menstrual Concern  Onset/Duration: off and on for a while.     She had nexplanon April 2018. It was \"amazing\" the first year - no bleeding or spotting. Then started have bleeding spotting.   Was seen Aug 2020 Nissen PA-C- Pap -ASCUS, neg chlamydia/gonorrhea.   Had intercourse 3 weeks ago- new partner- did use condom. Since has been spotting and bleeding.  Has PCB after intercourse.   She is also still on OCP + nexplanon.  She was told to skip sugar pill week.   Bleeding is spotty      Description:   Duration of bleeding episodes: she had sex 3 weeks ago and started bleeding right after. She has the Neplanon placed and she was also placed on the pill to help with bleeding. She states that most of the time it is brown in color and more spotting.   Frequency of periods: (1st day of one to 1st day of next): she states that she spots all the time  Describe bleeding/flow:   Clots: YES- little ones  Number of pads/day: 3 liners a day        Cramping: mild  Accompanying Signs & Symptoms:  Lightheadedness: nono  Temperature intolerance: no  Nosebleeds/Easy bruising: no  Vaginal Discharge: YES  Acne: no  Change in body hair: she does wax so she doesn't know if this what is causing the itching  History:  No LMP recorded. Patient has had an implant.  Previous normal periods: she states that the Neplaton use to work really well for her when it first was placed.   Contraceptive use: oral contraceptive and condoms  Sexually active: YES- she would be open to STD testing.   Any bleeding after intercourse: YES- and it has been going on for the last 3 weeks.   Abnormal PAP Smears: YES- she things so  Precipitating or alleviating factors: None  Therapies tried and outcome: None    URI sx - x 7 days  Pt is also concerned she has a sinus infection started. Not at stage where she'd normally be seen but " "wanted to bring it up \"because no body will see you in person due to covid\". Has post-nasal drainage. When blowing it is yellow and thick. Mild sinus pressure \"but that hasn't started yet\". No cough \"but if I wait a few days I will\". Denies ear pain, ST, CP, SOB, cough, GI sx.   Denies seasonal allergies or asthma.     Review of Systems     Constitutional, HEENT, cardiovascular, pulmonary, gi and gu systems are negative, except as otherwise noted.      Objective    /76   Pulse 75   Temp 98.4  F (36.9  C) (Temporal)   Resp 16   Wt 72.9 kg (160 lb 11.2 oz)   SpO2 95%   BMI 24.43 kg/m    Body mass index is 24.43 kg/m .  Physical Exam   GENERAL: healthy, alert and no distress  EYES: Eyes grossly normal to inspection, PERRL and conjunctivae and sclerae normal  HENT: Normal cephalic/atraumatic. Maxillary sinuses and frontal NNT. Ear canals clear and TM's normal, no effusion. Nose mucosa erythematous and swollen turbinates. Lips normal, no lesions. Buccal muscosa moist. Soft palate no lesions or ulcers. Tonsils normal, no significant erythema, no exudates. Uvula midline. Posterior oropharynx normal, no drainage.   NECK: no adenopathy, no asymmetry, masses, or scars   RESP: lungs clear to auscultation - no rales, rhonchi or wheezes  CV: regular rate and rhythm, normal S1 S2, no S3 or S4, no murmur, click or rub, no peripheral edema and peripheral pulses strong   (female): normal female external genitalia, normal urethral meatus, vaginal mucosa, normal cervix- dark brown blood in vault, small dark brown spotting from os; bimanual normal adnexa/uterus without CMT, tenderness or masses   MS: no gross musculoskeletal defects noted, no edema  NEURO: Normal strength and tone, mentation intact and speech normal  PSYCH: mentation appears normal, affect normal/bright    Results for orders placed or performed in visit on 10/05/20   Neisseria gonorrhoeae PCR     Status: None    Specimen: Cervix   Result Value Ref Range "    Specimen Descrip Cervix     N Gonorrhea PCR Negative NEG^Negative   Chlamydia trachomatis PCR     Status: None    Specimen: Cervix   Result Value Ref Range    Specimen Description Cervix     Chlamydia Trachomatis PCR Negative NEG^Negative       Assessment & Plan     Abnormal uterine bleeding  Pt has continued to have abnormal bleeding with nexplanon and an oral combined OCP that was added.   Neg STD screening.  Pap up to date  Plan for US to eval for structural causes  Likely due to method of action of nexplanon.  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 She wants to not have bleeding.   Discussed depo, mirena or continuous OCP.   She would like to try mirena. And if bleeding is an issue, will then plan for continuous OCP.    - US Pelvic Complete with Transvaginal; Future    Nexplanon in place  See above    Screen for STD (sexually transmitted disease)  Safe sex practices discussed and advised. STD screening as below.   - Wet prep; Future  - Chlamydia trachomatis PCR; Future  - Neisseria gonorrhoeae PCR; Future  - Neisseria gonorrhoeae PCR  - Chlamydia trachomatis PCR    Acute non-recurrent maxillary sinusitis  Pt will monitor for another 3-4 days.   If sx persist to her typical sinusitis - will start abx as below   - doxycycline hyclate (VIBRAMYCIN) 100 MG capsule; Take 1 capsule (100 mg) by mouth 2 times daily        Follow Up: The patient was instructed to contact clinic for worsening symptoms, non-improvement as expected/discussed, and for questions regarding medications or treatment plan. Discussed parameters for follow up and included in After Visit Summary given to patient.      Return in about 1 week (around 10/12/2020). with GYN     NADIYA Bazan [facility] [place]

## 2020-10-05 ENCOUNTER — OFFICE VISIT (OUTPATIENT)
Dept: FAMILY MEDICINE | Facility: CLINIC | Age: 20
End: 2020-10-05
Payer: COMMERCIAL

## 2020-10-05 VITALS
TEMPERATURE: 98.4 F | BODY MASS INDEX: 24.43 KG/M2 | HEART RATE: 75 BPM | DIASTOLIC BLOOD PRESSURE: 76 MMHG | RESPIRATION RATE: 16 BRPM | OXYGEN SATURATION: 95 % | WEIGHT: 160.7 LBS | SYSTOLIC BLOOD PRESSURE: 128 MMHG

## 2020-10-05 DIAGNOSIS — Z11.3 SCREEN FOR STD (SEXUALLY TRANSMITTED DISEASE): ICD-10-CM

## 2020-10-05 DIAGNOSIS — N93.9 ABNORMAL UTERINE BLEEDING: Primary | ICD-10-CM

## 2020-10-05 DIAGNOSIS — Z97.5 NEXPLANON IN PLACE: ICD-10-CM

## 2020-10-05 DIAGNOSIS — J01.00 ACUTE NON-RECURRENT MAXILLARY SINUSITIS: ICD-10-CM

## 2020-10-05 PROCEDURE — 99214 OFFICE O/P EST MOD 30 MIN: CPT | Performed by: PHYSICIAN ASSISTANT

## 2020-10-05 PROCEDURE — 87591 N.GONORRHOEAE DNA AMP PROB: CPT | Performed by: PHYSICIAN ASSISTANT

## 2020-10-05 PROCEDURE — 87491 CHLMYD TRACH DNA AMP PROBE: CPT | Performed by: PHYSICIAN ASSISTANT

## 2020-10-05 RX ORDER — DOXYCYCLINE 100 MG/1
100 CAPSULE ORAL 2 TIMES DAILY
Qty: 20 CAPSULE | Refills: 0 | Status: SHIPPED | OUTPATIENT
Start: 2020-10-05 | End: 2021-02-10

## 2020-10-05 NOTE — PATIENT INSTRUCTIONS
To schedule your test or specialty referral please call:  [facility]:  Radiology (imaging- mammogram, ultrasound, CT, MRI): [phone] [address]  [facility] [address]     See GYN for removal of nexplanon and insertion of mirena     If needed start doxycycline twice daily for 10 days

## 2020-10-16 ENCOUNTER — OFFICE VISIT (OUTPATIENT)
Dept: OBGYN | Facility: CLINIC | Age: 20
End: 2020-10-16
Payer: COMMERCIAL

## 2020-10-16 ENCOUNTER — ANCILLARY PROCEDURE (OUTPATIENT)
Dept: ULTRASOUND IMAGING | Facility: CLINIC | Age: 20
End: 2020-10-16
Attending: PHYSICIAN ASSISTANT
Payer: COMMERCIAL

## 2020-10-16 VITALS
DIASTOLIC BLOOD PRESSURE: 81 MMHG | HEART RATE: 78 BPM | WEIGHT: 159.3 LBS | BODY MASS INDEX: 24.22 KG/M2 | SYSTOLIC BLOOD PRESSURE: 120 MMHG

## 2020-10-16 DIAGNOSIS — N92.6 IRREGULAR MENSES: Primary | ICD-10-CM

## 2020-10-16 DIAGNOSIS — Z97.5 BREAKTHROUGH BLEEDING ON NEXPLANON: ICD-10-CM

## 2020-10-16 DIAGNOSIS — N92.1 BREAKTHROUGH BLEEDING ON NEXPLANON: ICD-10-CM

## 2020-10-16 DIAGNOSIS — N93.9 ABNORMAL UTERINE BLEEDING: ICD-10-CM

## 2020-10-16 PROCEDURE — 76856 US EXAM PELVIC COMPLETE: CPT

## 2020-10-16 PROCEDURE — 76830 TRANSVAGINAL US NON-OB: CPT

## 2020-10-16 PROCEDURE — 99214 OFFICE O/P EST MOD 30 MIN: CPT | Performed by: OBSTETRICS & GYNECOLOGY

## 2020-10-16 RX ORDER — BIOTIN 10000 MCG
CAPSULE ORAL
COMMUNITY
End: 2024-02-15

## 2020-10-16 NOTE — PATIENT INSTRUCTIONS
If you have any questions regarding your visit, Please contact your care team.  [facility]: [phone]  [facility] CLINIC HOURS TELEPHONE NUMBER   Cephas Agbeh, M.D.      Maryam Greco-  Rebecca-         Monday-[place]    8:00a.m-4:45 p.m    Tuesday--[place]     8:00a.m-4:45 p.m.    Thursday-[place]    8:00a.m-4:45 p.m.    Friday-[place]    8:00a.m-4:45 p.m    [facility]   [address] [place] [address]   [phone]-Ask for [facility]   Fax [phone]   Emymjeu-[phone]     [facility]   [address] [place] [address] [place] [address]   [phone]    [facility]  [address] [address]  [phone]  [place]-[phone]   Urgent Care locations:    [facility] Monday-Friday  5 pm - 9 pm  Saturday and Sunday   9 am - 5 pm   Monday-Friday   5 pm - 9 pm  Saturday and Sunday  9 am - 5 pm    [phone]   If you need a medication refill, please contact your pharmacy. Please allow 3 business days for your refill to be completed.  As always, Thank you for trusting us with your healthcare needs!

## 2020-10-16 NOTE — PROGRESS NOTES
Arlen is a 20 year old  referred here by self for consultation regarding irregular menses with breakthrough with Nexplanon placed . For the first year he was amenorrheic. She was placed on one on  estrace for breakthrogh bleeding a year ago. The irregular menses resumed after the estrace. For the last 3 months she has been on OCP. She is considering possible switching to hormornal IUD.  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    She had a normal pelvic ultrasound today as bellow.  EXAMINATION: US PELVIC COMPLETE WITH TRANSVAGINAL, 10/16/2020 2:51 PM      CLINICAL HISTORY: Vaginal spotting for over a year. LMP 9/15/2020.  abnormal uterine bleeding.; Abnormal uterine bleeding     COMPARISON: None pertinent     PROCEDURE COMMENT: Both transabdominal and transvaginal imaging  performed of the pelvis with color Doppler.     FINDINGS:  The uterus measures  3.5 cm x 7.9 cm x 4.6 cm. No focal myometrial  mass.      The endometrial stripe measures 8 mm.      The ovaries were not visualized transabdominally or transvaginally.     There is no simple free fluid in the pelvis.  No adnexal mass.                                                                      IMPRESSION:  1. Normal pelvic ultrasound. The endometrial stripe measures normally.  2. Ovaries were not visualized.     AN MD JULIANA  ROS: Ten point review of systems was reviewed and negative except the above.    Gyne: - abn pap (last pap ), - STD's    Past Medical History:   Diagnosis Date     Abnormal Pap smear of cervix 2020    see problem list     Asthma, mild persistent      Concussion 2012    Impact test 3/12     Mononucleosis 2019     Past Surgical History:   Procedure Laterality Date     ARTHROSCOPIC RECONSTRUCTION ANTERIOR CRUCIATE LIGAMENT  13    left     ESOPHAGOSCOPY, GASTROSCOPY, DUODENOSCOPY (EGD), COMBINED N/A 2016    Procedure: COMBINED ESOPHAGOSCOPY, GASTROSCOPY, DUODENOSCOPY (EGD), BIOPSY SINGLE OR  MULTIPLE;  Surgeon: Laurie Pride MD;  Location: [facility]      HC TOOTH EXTRACTION W/FORCEP       Patient Active Problem List   Diagnosis     Environmental allergies     Mild persistent asthma     Scoliosis     Leg length discrepancy     Torn ACL     Chronic rhinitis     Tinea versicolor     Dysmenorrhea     Other acne     Chest wall pain     Oropharyngeal dysphagia     Exercise-induced asthma     Nexplanon in place     Atypical squamous cells of undetermined significance (ASCUS) on Papanicolaou smear of cervix       ALL/Meds: Her medication and allergy histories were reviewed and are documented in their appropriate chart areas.    SH: - tob, - EtOH,     FH: Her family history was reviewed and documented in its appropriate chart area.    PE: /81   Pulse 78   Wt 72.3 kg (159 lb 4.8 oz)   Breastfeeding No   BMI 24.22 kg/m    Body mass index is 24.22 kg/m .    General Appearance:  healthy, alert, active, no distress  HEENT: NCAT  A/P    ICD-10-CM    1. Irregular menses  N92.6    2. Breakthrough bleeding on Nexplanon  N92.1     Z97.5       We discussed options of switching to IUD vs stopping the OCP and keeping the Nexplanon for now.  She will keep the nexplann and stop the OCP.. If the cycle do not improve in the next 1-3 months she will proceed with either the KYLEENA or rhe MIRENA. IUD.  Written information was provided on the above options.  25 minutes was spent face to face with the patient today discussing her history, diagnosis, and follow-up plan as noted above.  Over 50% of the visit was spent in counseling and coordination of care.    Total Visit Time: 30 minutes.        CEPHAS AGBEH, MD.

## 2020-11-10 ENCOUNTER — VIRTUAL VISIT (OUTPATIENT)
Dept: FAMILY MEDICINE | Facility: OTHER | Age: 20
End: 2020-11-10

## 2020-11-10 NOTE — PROGRESS NOTES
"Date: 11/10/2020 15:28:45  Clinician: Sumanth Rangel  Clinician NPI: [id number]  Patient: Arlen William  Patient : 2000  Patient Address: [address] [place] [address] [place] [address]  Patient Phone: [phone]  Visit Protocol: URI  Patient Summary:  Arlen is a 20 year old ( : 2000 ) female who initiated a [facility] Visit for COVID-19 (Coronavirus) evaluation and screening. When asked the question \"Please sign me up to receive news, health information and promotions. \", Arlen responded \"No\".    Arlen states her symptoms started gradually 5-6 days ago. After her symptoms started, they improved and then got worse again.   Her symptoms consist of myalgia, malaise, a sore throat, diarrhea, a headache, enlarged lymph nodes, a cough, and nasal congestion.   Symptom details     Nasal secretions: The color of her mucus is yellow.    Cough: Arlen coughs every 5-10 minutes and her cough is more bothersome at night. Phlegm comes into her throat when she coughs. She believes her cough is caused by post-nasal drip. The color of the phlegm is yellow.     Sore throat: Arlen reports having mild throat pain (1-3 on a 10 point pain scale), does not have exudate on her tonsils, and can swallow liquids. The lymph nodes in her neck are enlarged. A rash has not appeared on the skin since the sore throat started.     Headache: She states the headache is mild (1-3 on a 10 point pain scale).      Arlen denies having vomiting, rhinitis, facial pain or pressure, chills, teeth pain, ageusia, ear pain, wheezing, fever, nausea, and anosmia. She also denies taking antibiotic medication in the past month and having recent facial or sinus surgery in the past 60 days. She is not experiencing dyspnea.   Precipitating events  Within the past week, Arlen has not been exposed to someone with strep throat. She has not recently been exposed to someone with influenza. Arlen has not been in close contact with any high risk " individuals.   Pertinent COVID-19 (Coronavirus) information  Arlen does not work or volunteer as healthcare worker or a . In the past 14 days, Arlen has not worked or volunteered at a healthcare facility or group living setting.   In the past 14 days, she also has not lived in a congregate living setting.   Arlen has had a close contact with a laboratory-confirmed COVID-19 patient within 14 days of symptom onset. She was not exposed at her work. Date Arlen was exposed to the laboratory-confirmed COVID-19 patient: 11/03/2020   Additional information about contact with COVID-19 (Coronavirus) patient as reported by the patient (free text): Was in close contact with a friend on two occasions in the last week. She just tested positive yesterday.    Since December 2019, Arlen has been tested for COVID-19 and has had upper respiratory infection (URI) or influenza-like illness.      Result of COVID-19 test: Negative     Date of her COVID-19 test: 07/05/2020     Date(s) of previous URI or influenza-like illness (free-text): Mid September     Symptoms Arlen experienced during previous URI or influenza-like illness as reported by the patient (free-text): Normal sinus pressure and stuffy nose, I always get it early fall.        Pertinent medical history  Arlen had 1 sinus infection within the past year.   Arlen does not get yeast infections when she takes antibiotics.   Arlen needs a return to work/school note.   Weight: 160 lbs   Arlen does not smoke or use smokeless tobacco.   She denies pregnancy and denies breastfeeding. She has menstruated in the past month.   Weight: 160 lbs    MEDICATIONS: No current medications, ALLERGIES: NKDA  Clinician Response:  Dear Arlen,   Your symptoms show that you may have coronavirus (COVID-19). This illness can cause fever, cough and trouble breathing. Many people get a mild case and get better on their own. Some people can get very sick.  What  "should I do?  We would like to test you for this virus.   1. Please call [phone] to schedule your visit. Explain that you were referred by [facility] to have a COVID-19 test. Be ready to share your [facility] visit ID number.  * If you need to schedule in [facility] please call [phone] or for [employer] [place] employees please call [phone].  * If you need to schedule in the [place] area please call [phone]. [place] employees call [phone].  The following will serve as your written order for this COVID Test, ordered by me, for the indication of suspected COVID [Z20.828]: The test will be ordered in [employer], our electronic health record, after you are scheduled. It will show as ordered and authorized by Edwin Polk MD.  Order: COVID-19 (Coronavirus) PCR for SYMPTOMATIC testing from [facility].   2. When it's time for your COVID test:  Stay at least 6 feet away from others. (If someone will drive you to your test, stay in the backseat, as far away from the  as you can.)   Cover your mouth and nose with a mask, tissue or washcloth.  Go straight to the testing site. Don't make any stops on the way there or back.      3.Starting now: Stay home and away from others (self-isolate) until:   You've had no fever---and no medicine that reduces fever---for one full day (24 hours). And...   Your other symptoms have gotten better. For example, your cough or breathing has improved. And...   At least 10 days have passed since your symptoms started.       During this time, don't leave the house except for testing or medical care.   Stay in your own room, even for meals. Use your own bathroom if you can.   Stay away from others in your home. No hugging, kissing or shaking hands. No visitors.  Don't go to work, school or anywhere else.    Clean \"high touch\" surfaces often (doorknobs, counters, handles, etc.). Use a household cleaning spray or wipes. You'll find a full list of  on the EPA website: " www.epa.gov/pesticide-registration/list-n-disinfectants-use-against-sars-cov-2.   Cover your mouth and nose with a mask, tissue or washcloth to avoid spreading germs.  Wash your hands and face often. Use soap and water.  Caregivers in these groups are at risk for severe illness due to COVID-19:  o People 65 years and older  o People who live in a nursing home or long-term care facility  o People with chronic disease (lung, heart, cancer, diabetes, kidney, liver, immunologic)  o People who have a weakened immune system, including those who:   Are in cancer treatment  Take medicine that weakens the immune system, such as corticosteroids  Had a bone marrow or organ transplant  Have an immune deficiency  Have poorly controlled HIV or AIDS  Are obese (body mass index of 40 or higher)  Smoke regularly   o Caregivers should wear gloves while washing dishes, handling laundry and cleaning bedrooms and bathrooms.  o Use caution when washing and drying laundry: Don't shake dirty laundry, and use the warmest water setting that you can.  o For more tips, go to www.cdc.gov/coronavirus/2019-ncov/downloads/10Things.pdf.    4.Sign up for Room 77. We know it's scary to hear that you might have COVID-19. We want to track your symptoms to make sure you're okay over the next 2 weeks. Please look for an email from Room 77---this is a free, online program that we'll use to keep in touch. To sign up, follow the link in the email. Learn more at [email or web address]  How can I take care of myself?   Get lots of rest. Drink extra fluids (unless a doctor has told you not to).   Take Tylenol (acetaminophen) for fever or pain. If you have liver or kidney problems, ask your family doctor if it's okay to take Tylenol.   Adults can take either:    650 mg (two 325 mg pills) every 4 to 6 hours, or...   1,000 mg (two 500 mg pills) every 8 hours as needed.    Note: Don't take more than 3,000 mg in one day. Acetaminophen is found  in many medicines (both prescribed and over-the-counter medicines). Read all labels to be sure you don't take too much.   For children, check the Tylenol bottle for the right dose. The dose is based on the child's age or weight.    If you have other health problems (like cancer, heart failure, an organ transplant or severe kidney disease): Call your specialty clinic if you don't feel better in the next 2 days.       Know when to call 911. Emergency warning signs include:    Trouble breathing or shortness of breath Pain or pressure in the chest that doesn't go away Feeling confused like you haven't felt before, or not being able to wake up Bluish-colored lips or face.  Where can I get more information?    [facility] -- About COVID-19: [email or web address]/   CDC -- What to Do If You're Sick: www.cdc.gov/coronavirus/2019-ncov/about/steps-when-sick.html   [facility] -- Ending Home Isolation: www.cdc.gov/coronavirus/2019-ncov/hcp/disposition-in-home-patients.html   CDC -- Caring for Someone: www.cdc.gov/coronavirus/2019-ncov/if-you-are-sick/care-for-someone.html   [facility] -- Interim Guidance for Hospital Discharge to Home: www.health.[facility].[place]/diseases/coronavirus/hcp/hospdischarge.pdf   [facility] clinical trials (COVID-19 research studies): clinicalaffairs.[facility]-clinical-trials    Below are the COVID-19 hotlines at the [facility] ([facility]). Interpreters are available.    For health questions: Call 377-231-8125 or 1-560.219.7368 (7 a.m. to 7 p.m.) For questions about schools and childcare: Call 110-763-2022 or 1-269.855.3507 (7 a.m. to 7 p.m.)    Diagnosis: Contact with and (suspected) exposure to other viral communicable diseases  Diagnosis ICD: Z20.828  Additional Clinician Notes:   If your symptoms are not improving or worsen, please go to one of our urgent care locations for evaluation and possible lab work.

## 2020-11-11 DIAGNOSIS — Z20.822 SUSPECTED COVID-19 VIRUS INFECTION: Primary | ICD-10-CM

## 2020-11-12 DIAGNOSIS — Z20.822 SUSPECTED 2019 NOVEL CORONAVIRUS INFECTION: Primary | ICD-10-CM

## 2020-11-12 DIAGNOSIS — Z20.822 SUSPECTED COVID-19 VIRUS INFECTION: ICD-10-CM

## 2020-11-12 PROCEDURE — U0003 INFECTIOUS AGENT DETECTION BY NUCLEIC ACID (DNA OR RNA); SEVERE ACUTE RESPIRATORY SYNDROME CORONAVIRUS 2 (SARS-COV-2) (CORONAVIRUS DISEASE [COVID-19]), AMPLIFIED PROBE TECHNIQUE, MAKING USE OF HIGH THROUGHPUT TECHNOLOGIES AS DESCRIBED BY CMS-2020-01-R: HCPCS | Performed by: PHYSICIAN ASSISTANT

## 2020-11-13 LAB
SARS-COV-2 RNA SPEC QL NAA+PROBE: NOT DETECTED
SPECIMEN SOURCE: NORMAL

## 2020-12-14 ENCOUNTER — HEALTH MAINTENANCE LETTER (OUTPATIENT)
Age: 20
End: 2020-12-14

## 2021-02-05 NOTE — PROGRESS NOTES
Assessment & Plan     Vaginal discharge  Swabs obtained   - NEISSERIA GONORRHOEA PCR  - CHLAMYDIA TRACHOMATIS PCR  - Wet prep    Screen for STD (sexually transmitted disease)  Safe sex practices discussed and advised. STD screening - as above. Pt declines HIV, syphilis, hep C- has screening w/     BV (bacterial vaginosis)  Treat BV per orders  Discussed possible side effects w/abx, how to take and abstain from alcohol   - metroNIDAZOLE (FLAGYL) 500 MG tablet; Take 1 tablet (500 mg) by mouth 2 times daily for 7 days    Follow Up: The patient was instructed to contact clinic for worsening symptoms, non-improvement as expected/discussed, and for questions regarding medications or treatment plan. Discussed parameters for follow up and included in After Visit Summary given to patient.      Return in about 1 week (around 2/17/2021) for Recheck if needed for non-improvement.    NADIYA Bazan [facility] [place] is a 20 year old who presents to clinic today for the following health issues  accompanied by her self:    History of Present Illness       She eats 2-3 servings of fruits and vegetables daily.She consumes 0 sweetened beverage(s) daily.She exercises with enough effort to increase her heart rate 30 to 60 minutes per day.  She exercises with enough effort to increase her heart rate 4 days per week.   She is taking medications regularly.         Vaginal Symptoms  Onset/Duration: off and on for 3 months  Has a new partner. Together few weeks. Would like STD screening. Not using condoms consistently.   She states always has discharge. White, occasionally light yellow. No itching. No pain.  Has spotting from nexplanon frequently. With that sometimes spotting w/intercourse.   Denies urinary sx, NV, abd pain, change in BMs.  Denies sores, lumps, skin changes.     She is nearing time for nexplanon to come out. She is debating replacing with new nexplanon vs transitioning  "to OCP. She considered mirena or diane but thinks will go with an alternative to an IUD.     Description:  Vaginal Discharge: white yellow   Itching (Pruritis): YES  Burning sensation:  no  Odor: no  Accompanying Signs & Symptoms:  Urinary symptoms: no  Abdominal pain: vaginal pain  Fever: no  History:   Sexually active: YES  New Partner: YES  Possibility of Pregnancy:  no  Recent antibiotic use: no  Previous vaginitis issues: no  Precipitating or alleviating factors: new partner  Therapies tried and outcome: none      Review of Systems   Constitutional, HEENT, cardiovascular, pulmonary, gi and gu systems are negative, except as otherwise noted.      Objective    /78 (BP Location: Left arm, Patient Position: Chair, Cuff Size: Adult Regular)   Pulse 71   Temp 98  F (36.7  C) (Temporal)   Resp 15   Ht 1.734 m (5' 8.25\")   Wt 71.7 kg (158 lb)   LMP  (Exact Date)   SpO2 98%   Breastfeeding No   BMI 23.85 kg/m    Body mass index is 23.85 kg/m .  Physical Exam   GENERAL: healthy, alert and no distress  ABDOMEN: soft, nontender, no hepatosplenomegaly, no masses and bowel sounds normal   (female): normal female external genitalia, normal urethral meatus, vaginal mucosa, normal cervix/adnexa/uterus without masses or discharge  MS: no gross musculoskeletal defects noted, no edema    Results for orders placed or performed in visit on 02/10/21 (from the past 24 hour(s))   Wet prep    Specimen: Vagina   Result Value Ref Range    Specimen Description Vagina     Wet Prep No Trichomonas seen     Wet Prep Clue cells seen  Few   (A)     Wet Prep No yeast seen     Wet Prep WBC'S seen  Few          "

## 2021-02-10 ENCOUNTER — OFFICE VISIT (OUTPATIENT)
Dept: FAMILY MEDICINE | Facility: CLINIC | Age: 21
End: 2021-02-10
Payer: COMMERCIAL

## 2021-02-10 VITALS
BODY MASS INDEX: 23.95 KG/M2 | HEART RATE: 71 BPM | TEMPERATURE: 98 F | DIASTOLIC BLOOD PRESSURE: 78 MMHG | WEIGHT: 158 LBS | HEIGHT: 68 IN | SYSTOLIC BLOOD PRESSURE: 118 MMHG | RESPIRATION RATE: 15 BRPM | OXYGEN SATURATION: 98 %

## 2021-02-10 DIAGNOSIS — B96.89 BV (BACTERIAL VAGINOSIS): ICD-10-CM

## 2021-02-10 DIAGNOSIS — Z11.3 SCREEN FOR STD (SEXUALLY TRANSMITTED DISEASE): ICD-10-CM

## 2021-02-10 DIAGNOSIS — N89.8 VAGINAL DISCHARGE: Primary | ICD-10-CM

## 2021-02-10 DIAGNOSIS — N76.0 BV (BACTERIAL VAGINOSIS): ICD-10-CM

## 2021-02-10 LAB
SPECIMEN SOURCE: ABNORMAL
WET PREP SPEC: ABNORMAL

## 2021-02-10 PROCEDURE — 87210 SMEAR WET MOUNT SALINE/INK: CPT | Performed by: PHYSICIAN ASSISTANT

## 2021-02-10 PROCEDURE — 87591 N.GONORRHOEAE DNA AMP PROB: CPT | Performed by: PHYSICIAN ASSISTANT

## 2021-02-10 PROCEDURE — 87491 CHLMYD TRACH DNA AMP PROBE: CPT | Performed by: PHYSICIAN ASSISTANT

## 2021-02-10 PROCEDURE — 99213 OFFICE O/P EST LOW 20 MIN: CPT | Performed by: PHYSICIAN ASSISTANT

## 2021-02-10 RX ORDER — METRONIDAZOLE 500 MG/1
500 TABLET ORAL 2 TIMES DAILY
Qty: 14 TABLET | Refills: 0 | Status: SHIPPED | OUTPATIENT
Start: 2021-02-10 | End: 2021-02-17

## 2021-02-10 ASSESSMENT — MIFFLIN-ST. JEOR: SCORE: 1534.15

## 2021-02-10 ASSESSMENT — PAIN SCALES - GENERAL: PAINLEVEL: NO PAIN (0)

## 2021-02-11 ENCOUNTER — TELEPHONE (OUTPATIENT)
Dept: FAMILY MEDICINE | Facility: CLINIC | Age: 21
End: 2021-02-11

## 2021-02-11 DIAGNOSIS — A74.9 CHLAMYDIA INFECTION: Primary | ICD-10-CM

## 2021-02-11 DIAGNOSIS — Z11.3 SCREEN FOR STD (SEXUALLY TRANSMITTED DISEASE): Primary | ICD-10-CM

## 2021-02-11 LAB
C TRACH DNA SPEC QL NAA+PROBE: POSITIVE
N GONORRHOEA DNA SPEC QL NAA+PROBE: NEGATIVE
SPECIMEN SOURCE: ABNORMAL
SPECIMEN SOURCE: NORMAL

## 2021-02-11 RX ORDER — AZITHROMYCIN 1 G/1
1 POWDER, FOR SUSPENSION ORAL ONCE
Qty: 1 EACH | Refills: 0 | Status: SHIPPED | OUTPATIENT
Start: 2021-02-11 | End: 2021-02-11

## 2021-02-11 ASSESSMENT — ASTHMA QUESTIONNAIRES: ACT_TOTALSCORE: 25

## 2021-02-11 NOTE — TELEPHONE ENCOUNTER
Routing to PA Pool please start a PRIOR AUTHORIZATION  azithromycin (ZITHROMAX) 1 g powder    Key:[id number]   2000    Thanks  Kathleen Purcell RT (R)

## 2021-02-11 NOTE — TELEPHONE ENCOUNTER
Please have partner follow-up with primary care provider.    Gerber Mendoza MD, FAAFP  Family Medicine Physician  [facility] [place]  [address] [address], [place] [address]

## 2021-02-11 NOTE — TELEPHONE ENCOUNTER
Patient notified.  Patient is wondering if we send partner pills as well?    Kathleen Chi RN on 2/11/2021 at 3:37 PM

## 2021-02-11 NOTE — TELEPHONE ENCOUNTER
Patient was notified of her GC results.  She was treated by Dr. Mendoza in your absence.  Form for mandatory reporting was filled out and faxed.    Kathleen Chi RN on 2/11/2021 at 4:08 PM

## 2021-02-12 NOTE — TELEPHONE ENCOUNTER
Central Prior Authorization Team   Phone: [phone]      PA Initiation    Medication: azithromycin (ZITHROMAX) 1 g powder - INITIATED  Insurance Company: [employer] - Phone [phone] Fax [phone]  Pharmacy Filling the Rx: [id number] - [place], [place] - [address] [place] [address]  Filling Pharmacy Phone: [phone]  Filling Pharmacy Fax: [phone]  Start Date: 2/12/2021

## 2021-02-16 ENCOUNTER — TELEPHONE (OUTPATIENT)
Dept: FAMILY MEDICINE | Facility: CLINIC | Age: 21
End: 2021-02-16

## 2021-02-16 NOTE — TELEPHONE ENCOUNTER
Pt.  Already got pill form. Can re-check labs in 6-8 weeks, she is going to schedule this. LEONARDO Paul CNP

## 2021-02-16 NOTE — TELEPHONE ENCOUNTER
Pt called to see if provider would call in RX for her boyfriend that lives in [place]. He is NOT a pt here. She is NOT sure his insurance will cover cost of Rx so wondering if there are FREE antibiotics available to treat him, also, for Chlamydia?  RN informed pt he should be seen. We cannot prescribe antibiotics to someone who has NOT ever been here - has NO provider here. This is not medically legal to do so.  She could have him check in [place],  to see if they have [facility] there, as pt thinks they would give out RX to those in need. If not, he SHOULD definitely get checked as they have NOT always had protected sex.   DERRICK Corrigan

## 2021-02-16 NOTE — TELEPHONE ENCOUNTER
Central Prior Authorization Team   Phone: [phone]      PRIOR AUTHORIZATION DENIED    Medication: azithromycin (ZITHROMAX) 1 g powder - DENIED    Denial Date: 2/16/2021    Denial Rational:       Appeal Information:

## 2021-04-27 ENCOUNTER — TELEPHONE (OUTPATIENT)
Dept: FAMILY MEDICINE | Facility: OTHER | Age: 21
End: 2021-04-27

## 2021-04-27 NOTE — TELEPHONE ENCOUNTER
Mychart scheduled appt in too short a time for nexplanon removal and replacment (and retesting?)   Please move to long appt type. Can use the rounding time if needed.  Nuria Farr MD

## 2021-04-27 NOTE — PROGRESS NOTES
Assessment & Plan       ICD-10-CM    1. Encounter for removal and reinsertion of Nexplanon  Z30.46 REMOVAL NON-BIODEGRADABLE DRUG DELIVERY IMPLANT     INSERTION NON-BIODEGRADABLE DRUG DELIVERY IMPLANT     etonogestrel (NEXPLANON) subdermal implant 68 mg   2. Advanced directives, counseling/discussion  Z71.89     declined info.    3. BV (bacterial vaginosis)  N76.0 metroNIDAZOLE (METROGEL) 0.75 % vaginal gel    B96.89       Backup method for contraception for 1 week.  Warning signs of bandage and skin cares discussed.  Urine pregnancy test negative.  Patient declines advanced directives.  Discussed recurrent BV and treatment.  We will try topical MetroGel with use biweekly as needed for prevention.  Discussed factors that could be impacting vaginal miguelito.  Return to clinic with any new or worsening symptoms, and as needed.     Pt. Getting vaccine records.                Return in about 4 months (around 8/30/2021) for Physical Exam.    LEONARDO Paul CNP  [facility] [place] is a 20 year old who presents for the following health issues     HPI     Patient is here for a Nexplanon removal and replacement.     Current one was [facility] 4/[facility]/2018 through [facility] 2 weeks overdue. Last intercourse over 1 month ago.     Has vaccines updated, getting old records.   Declines Advanced directive forms.     In [employer].   Is a  for a non-profit.   One current sexual partner, aware of safe sex, labs processing from yesterday.     Wants new RX for recurrent BV.        Review of Systems   Constitutional, HEENT, cardiovascular, pulmonary, gi and gu systems are negative, except as otherwise noted.      Objective    /73   Pulse 75   Resp 10   Wt 70.3 kg (155 lb)   LMP  (LMP Unknown)   SpO2 99%   BMI 23.40 kg/m    Body mass index is 23.4 kg/m .  Physical Exam   GENERAL: healthy, alert and no distress  MS: no gross musculoskeletal defects noted, no  edema  SKIN: no suspicious lesions or rashes  NEURO: Normal strength and tone, mentation intact and speech normal  PSYCH: mentation appears normal, affect normal/bright    Results for orders placed or performed in visit on 04/30/21 (from the past 24 hour(s))   HCG Qual, Urine ([id number])   Result Value Ref Range    HCG Qual Urine Negative NEG^Negative                   Nexplanon Removal Procedure note: After informed consent:Region cleansed and anesthestized with 1% lidocaine with epinephrine, approximately 2-3 cc's.  Using an 11 Blade Scalpel a 3 mm incision at distal tip of Nexplanon device on left upper arm. Nexplanon device retracted intact with forceps, without incident. NEW device placed in same location as previously placed.  Hemostasis done with light pressure- spontaneous. Appropriate dressing applied.

## 2021-04-27 NOTE — PROGRESS NOTES
Assessment & Plan     Nexplanon in place  She is having removed tomorrow with Phuong Montes DNP.  She is still contemplating replacing w/new nexplanon vs changing to alternative contraceptive method. Discussed other options. If she does not replace the nexplanon, she would like to start combined OCP.     Bacterial vaginosis  Treat per below.   - metroNIDAZOLE (FLAGYL) 500 MG tablet; Take 1 tablet (500 mg) by mouth 2 times daily for 7 days    History of vaginal infection  Pt is concerned her nexplanon may be causing recurrent vaginal infections.   Reviewed chart- over past 18mo has been treated for BV, trichomonas and chlamydia.   Discussed RF for vaginal infections, STDs, and recurrent BV.   Encouraged consistent condom use.   May have some hormonal effect contributing to BV, other factors like also contributory.     Vaginal discharge  Safe sex practices dicsussed.   Pt treated for chlamydia Feb 2021, partner treated as well. Test today to ensure resolution.   - Wet prep  - Chlamydia trachomatis PCR  - Neisseria gonorrhoeae PCR  }     Follow Up: The patient was instructed to contact clinic for worsening symptoms, non-improvement as expected/discussed, and for questions regarding medications or treatment plan. Discussed parameters for follow up and included in After Visit Summary given to patient.      Return in about 1 day (around 4/30/2021) for with Phuong Montes .    NADIYA Bazan [facility] [place] is a 20 year old female who presents to clinic today for the following health issues     HPI     Patient would like to discuss birth control options. Scheduled for a nexplanon removal tomorrow with JOSEPH.  Ongoing vaginal symptoms may be from nexplanon or from patient being active duty.     ----  Due for Nexplanon to be out. Getting it out tomorrow w/Phuong Montes DNP.   She really liked her nexplanon the first 18months - no bleeding. Has had more bleeding last  "18months.    She was treated for chlamydia Feb 2021.   Her current partner was also treated for chlamydia.   She would like to be tested today to be sure it is resolved.   They have been together since Jan 2021. They are a long distance relationship. Last together March 2021.     She has had BV on more frequent basis-  Per chart review positive wet preps: 02/10/2021, 08/12/2020, 09/13/2019  Trichomonas- 03/11/2020    She has itchiness and discharge. The discharge is milky-white. No odor.   She feels like she is consistently having trouble with abnormal discharge and is concerned it is from her nexplanon.   She had an antibiotic in Feb 2021 so thought current discharge maybe could be yeast. She treated with monistat 1 day - 1 week ago. That did not help her symptoms.   She is more prone to BV than yeast infections.     She is also active duty with  and has had times where does not have access to typical hygiene options and showers.     She and current partner are not using condoms.         Review of Systems   Constitutional, HEENT, cardiovascular, pulmonary, gi and gu systems are negative, except as otherwise noted.      Objective    BP 92/66   Pulse 75   Temp 97.7  F (36.5  C) (Temporal)   Resp 18   Ht 1.734 m (5' 8.25\")   Wt 70.6 kg (155 lb 11.2 oz)   LMP  (LMP Unknown)   SpO2 97%   Breastfeeding No   BMI 23.50 kg/m    Body mass index is 23.5 kg/m .  Physical Exam   GENERAL: healthy, alert and no distress   (female): normal female external genitalia, normal urethral meatus, vaginal mucosa, normal cervix/adnexa/uterus without masses or discharge  MS: no gross musculoskeletal defects noted, no edema  NEURO: Normal strength and tone, mentation intact and speech normal  PSYCH: mentation appears normal, affect normal/bright    Results for orders placed or performed in visit on 04/29/21 (from the past 24 hour(s))   Wet prep    Specimen: Vagina   Result Value Ref Range    Specimen Description Vagina     " Wet Prep No Trichomonas seen     Wet Prep Clue cells seen  Few   (A)     Wet Prep No yeast seen     Wet Prep WBC'S seen  Few

## 2021-04-29 ENCOUNTER — OFFICE VISIT (OUTPATIENT)
Dept: FAMILY MEDICINE | Facility: CLINIC | Age: 21
End: 2021-04-29
Payer: COMMERCIAL

## 2021-04-29 VITALS
BODY MASS INDEX: 23.6 KG/M2 | SYSTOLIC BLOOD PRESSURE: 92 MMHG | TEMPERATURE: 97.7 F | RESPIRATION RATE: 18 BRPM | HEIGHT: 68 IN | HEART RATE: 75 BPM | WEIGHT: 155.7 LBS | DIASTOLIC BLOOD PRESSURE: 66 MMHG | OXYGEN SATURATION: 97 %

## 2021-04-29 DIAGNOSIS — B96.89 BACTERIAL VAGINOSIS: ICD-10-CM

## 2021-04-29 DIAGNOSIS — Z87.42 HISTORY OF VAGINAL INFECTION: ICD-10-CM

## 2021-04-29 DIAGNOSIS — N76.0 BACTERIAL VAGINOSIS: ICD-10-CM

## 2021-04-29 DIAGNOSIS — N89.8 VAGINAL DISCHARGE: ICD-10-CM

## 2021-04-29 DIAGNOSIS — Z97.5 NEXPLANON IN PLACE: Primary | ICD-10-CM

## 2021-04-29 LAB
SPECIMEN SOURCE: ABNORMAL
WET PREP SPEC: ABNORMAL

## 2021-04-29 PROCEDURE — 99214 OFFICE O/P EST MOD 30 MIN: CPT | Performed by: PHYSICIAN ASSISTANT

## 2021-04-29 PROCEDURE — 87491 CHLMYD TRACH DNA AMP PROBE: CPT | Performed by: PHYSICIAN ASSISTANT

## 2021-04-29 PROCEDURE — 87591 N.GONORRHOEAE DNA AMP PROB: CPT | Performed by: PHYSICIAN ASSISTANT

## 2021-04-29 PROCEDURE — 87210 SMEAR WET MOUNT SALINE/INK: CPT | Performed by: PHYSICIAN ASSISTANT

## 2021-04-29 RX ORDER — METRONIDAZOLE 500 MG/1
500 TABLET ORAL 2 TIMES DAILY
Qty: 14 TABLET | Refills: 0 | Status: SHIPPED | OUTPATIENT
Start: 2021-04-29 | End: 2021-05-06

## 2021-04-29 ASSESSMENT — MIFFLIN-ST. JEOR: SCORE: 1523.72

## 2021-04-29 NOTE — PATIENT INSTRUCTIONS
Will recheck to be sure chlamydia is resolved    Vaginal swab for bacteria or yeast      Patient Education     Bacterial Vaginosis    You have a vaginal infection called bacterial vaginosis (BV). Both good and bad bacteria are present in a healthy vagina. BV occurs when these bacteria get out of balance. The number of bad bacteria increase. And the number of good bacteria decrease. BV is linked with sexual activity, but it's not a sexually transmitted infection (STI).   BV may or may not cause symptoms. If symptoms do occur, they can include:     Thin, gray, milky-white, or sometimes green discharge    Unpleasant odor or  fishy  smell    Itching, burning, or pain in or around the vagina  It is not known what causes BV, but certain factors can make the problem more likely. These can include:     Douching    Spermicides    Use of antibiotics    Change in hormone levels with pregnancy, breastfeeding, or menopause    Having sex with a new partner    Having sex with more than one partner  BV will sometimes go away on its own. But treatment is often advised. This is because untreated BV can raise the risk of more serious health problems such as:     Pelvic inflammatory disease (PID)     delivery (giving birth to a baby early if you re pregnant)    HIV and some other sexually transmitted infections (STIs)    Infection after surgery on the reproductive organs  Home care  General care    BV is most often treated with medicines called antibiotics. These may be given as pills or as a vaginal cream. If antibiotics are prescribed, be sure to use them exactly as directed. And complete all of the medicine, even if your symptoms go away.    Don't douche or having sex during treatment.    If you have sex with a female partner, ask your healthcare provider if she should also be treated.  Prevention    Don't douche.    Don't have sex. If you do have sex, then take steps to lower your risk:  ? Use condoms when having  sex.  ? Limit the number of sex partners you have.    Follow-up care  Follow up with your healthcare provider, or as advised.   When to get medical advice  Call your healthcare provider right away if:     You have a fever of 100.4 F (38 C) or higher, or as directed by your provider.    Your symptoms get worse, or they don t go away within a few days of starting treatment.    You have new pain in the lower belly or pelvic region.    You have side effects that bother you or a reaction to the pills or cream you re prescribed.    You or any of your sex partners have new symptoms, such as a rash, joint pain, or sores.  [employer] last reviewed this educational content on 6/1/2020 2000-2021 [employer]. All rights reserved. This information is not intended as a substitute for professional medical care. Always follow your healthcare professional's instructions.

## 2021-04-30 ENCOUNTER — OFFICE VISIT (OUTPATIENT)
Dept: FAMILY MEDICINE | Facility: CLINIC | Age: 21
End: 2021-04-30
Payer: COMMERCIAL

## 2021-04-30 VITALS
BODY MASS INDEX: 23.4 KG/M2 | DIASTOLIC BLOOD PRESSURE: 73 MMHG | RESPIRATION RATE: 10 BRPM | HEART RATE: 75 BPM | SYSTOLIC BLOOD PRESSURE: 105 MMHG | OXYGEN SATURATION: 99 % | WEIGHT: 155 LBS

## 2021-04-30 DIAGNOSIS — B96.89 BV (BACTERIAL VAGINOSIS): ICD-10-CM

## 2021-04-30 DIAGNOSIS — Z30.46 ENCOUNTER FOR REMOVAL AND REINSERTION OF NEXPLANON: Primary | ICD-10-CM

## 2021-04-30 DIAGNOSIS — N76.0 BV (BACTERIAL VAGINOSIS): ICD-10-CM

## 2021-04-30 DIAGNOSIS — Z71.89 ADVANCED DIRECTIVES, COUNSELING/DISCUSSION: ICD-10-CM

## 2021-04-30 LAB
C TRACH DNA SPEC QL NAA+PROBE: NEGATIVE
HCG UR QL: NEGATIVE
N GONORRHOEA DNA SPEC QL NAA+PROBE: NEGATIVE
SPECIMEN SOURCE: NORMAL
SPECIMEN SOURCE: NORMAL

## 2021-04-30 PROCEDURE — 99213 OFFICE O/P EST LOW 20 MIN: CPT | Mod: 25 | Performed by: NURSE PRACTITIONER

## 2021-04-30 PROCEDURE — 11983 REMOVE/INSERT DRUG IMPLANT: CPT | Performed by: NURSE PRACTITIONER

## 2021-04-30 PROCEDURE — 2894A PR INSERTION NON-BIODEGRADABLE DRUG DELIVERY IMPLANT: CPT | Mod: 51 | Performed by: NURSE PRACTITIONER

## 2021-04-30 PROCEDURE — 81025 URINE PREGNANCY TEST: CPT | Performed by: NURSE PRACTITIONER

## 2021-04-30 RX ORDER — ETONOGESTREL 68 MG/1
1 IMPLANT SUBCUTANEOUS ONCE
Qty: 1 EACH | Refills: 0 | COMMUNITY
Start: 2021-04-30 | End: 2021-09-17

## 2021-04-30 RX ORDER — METRONIDAZOLE 7.5 MG/G
1 GEL VAGINAL DAILY
Qty: 70 G | Refills: 1 | Status: SHIPPED | OUTPATIENT
Start: 2021-04-30 | End: 2022-10-10

## 2021-04-30 ASSESSMENT — ASTHMA QUESTIONNAIRES: ACT_TOTALSCORE: 25

## 2021-04-30 NOTE — RESULT ENCOUNTER NOTE
Hipolito,  I have reviewed your labs, and they are all normal. If you have questions, please notify me through MyChart or a telephone call.   Phuong Montes, DNP

## 2021-07-02 NOTE — TELEPHONE ENCOUNTER
DIAGNOSIS: left knee sharp pain  / self  / no images / HP ( 12/15/2017 last seen for same issue )   APPOINTMENT DATE: 7.13.21   NOTES STATUS DETAILS   OFFICE NOTE from referring provider N/A    OFFICE NOTE from other specialist Internal 12.15.17 Dr Greg Bundy, [facility]  2.6.15  1.15.14    PT in epic 1.15.14-8.22.13   DISCHARGE SUMMARY from hospital N/A    DISCHARGE REPORT from the ER N/A    OPERATIVE REPORT Care Everywhere 8.19.13 [facility]   EMG report N/A    MEDICATION LIST Internal    MRI PACS 8.1.13 L knee   DEXA (osteoporosis/bone health) N/A    CT SCAN N/A    XRAYS (IMAGES & REPORTS) PACS 8.29.13 L knee  8.19.13 L knee, R knee     Action 7.2.21 11:06 AM COREEN   Action Taken Requested [facility] images [phone]

## 2021-07-11 ASSESSMENT — ENCOUNTER SYMPTOMS
HOT FLASHES: 0
DECREASED LIBIDO: 0

## 2021-07-13 ENCOUNTER — OFFICE VISIT (OUTPATIENT)
Dept: ORTHOPEDICS | Facility: CLINIC | Age: 21
End: 2021-07-13
Payer: COMMERCIAL

## 2021-07-13 ENCOUNTER — PRE VISIT (OUTPATIENT)
Dept: ORTHOPEDICS | Facility: CLINIC | Age: 21
End: 2021-07-13

## 2021-07-13 ENCOUNTER — ANCILLARY PROCEDURE (OUTPATIENT)
Dept: GENERAL RADIOLOGY | Facility: CLINIC | Age: 21
End: 2021-07-13
Attending: ORTHOPAEDIC SURGERY
Payer: COMMERCIAL

## 2021-07-13 VITALS — HEIGHT: 68 IN | WEIGHT: 155 LBS | BODY MASS INDEX: 23.49 KG/M2

## 2021-07-13 DIAGNOSIS — M25.562 BILATERAL KNEE PAIN: ICD-10-CM

## 2021-07-13 DIAGNOSIS — M22.2X2 PATELLOFEMORAL DISORDER OF BOTH KNEES: Primary | ICD-10-CM

## 2021-07-13 DIAGNOSIS — M25.561 BILATERAL KNEE PAIN: ICD-10-CM

## 2021-07-13 DIAGNOSIS — M22.2X1 PATELLOFEMORAL DISORDER OF BOTH KNEES: Primary | ICD-10-CM

## 2021-07-13 PROCEDURE — 73562 X-RAY EXAM OF KNEE 3: CPT | Mod: RT | Performed by: RADIOLOGY

## 2021-07-13 PROCEDURE — 99202 OFFICE O/P NEW SF 15 MIN: CPT | Performed by: ORTHOPAEDIC SURGERY

## 2021-07-13 ASSESSMENT — MIFFLIN-ST. JEOR: SCORE: 1520.55

## 2021-07-13 NOTE — LETTER
7/13/2021         RE: Arlen William  [address]  [place] [address]        Dear Colleague,    Thank you for referring your patient, Arlen William, to the [facility] [place]. Please see a copy of my visit note below.    Service Date: 07/13/2021    CHIEF COMPLAINT:  Bilateral knee pain, left greater than right.    DATE OF SURGERY:  08/19/2013 left anterior cruciate ligament reconstruction with BTB autograft.    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  Her left knee is much worse than her right.  8 years status post left ACL reconstruction.  Her pain on both knees is anterior.  She points to a U shape around both patellae.  She has pain that is worse with running.  It is worse with prolonged sitting.  It is worse with squatting.  She does have patellofemoral crepitation actually worse on the right than the left, but her pain is worse on the left.  She has some occasional clicking.  She really does not think she has significant swelling.  She has had no physical therapy and no injections.    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  She does not smoke.    PHYSICAL EXAMINATION:  20-year-old female, alert and oriented, in no apparent distress.  Evaluation of bilateral knees reveals no effusion.  Range of motion 4 degrees of hyperextension to 140 degrees of flexion, which is symmetrical.  The left knee has well-healed surgical incision.  There is no effusion.  Negative Lachman.  No pivot shift.  No posterior drawer.  No opening to varus or valgus stress.  Nontender along the medial and lateral joint line.  Tender along her patellar tendon.  Tender anteromedially and anterolaterally.    Left knee reveals no effusion.  Negative Lachman.  No posterior drawer.  No pivot shift.  No patellar apprehension.  No significant patellar tilt.  Tender slightly over the  patellar tendon fat pad.    RADIOGRAPHS:  I personally reviewed the patient's bilateral knee radiographs obtained today.  The left knee reveals a previous ACL reconstruction with tunnels in good position.  Hardware in good position.  There is no significant cartilage space narrowing bilaterally.  There is no obvious fracture or loose body bilaterally.    IMPRESSION:  Hipolito William is a 20-year-old female with bilateral anterior knee pain, left greater than right.  I think both knees have a component of patellar tendinopathy as well as fat pad irritation.  I explained to Hipolito that this problem can be quite annoying but is not particularly serious.  Her ACL graft is intact and this was reassuring to her.  I think she could benefit from a formal physical therapy program.  She can work on strengthening.  We can focus on pelvifemoral, core and quad strengthening.  We did discuss injection therapy.  I think I would like to obtain an MRI before we did any type of injections.  I also think this may be unnecessary and she has an excellent chance of improving with rehabilitation.  All of her questions were answered.    PLAN:    1.  I will start Hipolito in physical therapy here at the [facility].  She will concentrate on quad, core, pelvifemoral strengthening.  I would like her to undergo a trial of fat pad taping to see if this improves her symptoms.  2.  If her symptoms do not improve over the next 8-12 weeks, we would consider obtaining an MRI of the left knee to evaluate for patellofemoral chondral wear as well as evaluate her extensive mechanism.  3.  She will try bilateral foot arthritis orthotics.  I think this is a good idea.      Greg Bundy MD        D: 2021   T: 2021   MT: leonel    Name:     PEE WILLIAM  MRN:      [id number]        Account:      [id number]   :      2000           Service Date: 2021       Document: [id number]

## 2021-07-14 NOTE — PROGRESS NOTES
Service Date: 07/13/2021    CHIEF COMPLAINT:  Bilateral knee pain, left greater than right.    DATE OF SURGERY:  08/19/2013 left anterior cruciate ligament reconstruction with BTB autograft.    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  Her left knee is much worse than her right.  8 years status post left ACL reconstruction.  Her pain on both knees is anterior.  She points to a U shape around both patellae.  She has pain that is worse with running.  It is worse with prolonged sitting.  It is worse with squatting.  She does have patellofemoral crepitation actually worse on the right than the left, but her pain is worse on the left.  She has some occasional clicking.  She really does not think she has significant swelling.  She has had no physical therapy and no injections.    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  She does not smoke.    PHYSICAL EXAMINATION:  20-year-old female, alert and oriented, in no apparent distress.  Evaluation of bilateral knees reveals no effusion.  Range of motion 4 degrees of hyperextension to 140 degrees of flexion, which is symmetrical.  The left knee has well-healed surgical incision.  There is no effusion.  Negative Lachman.  No pivot shift.  No posterior drawer.  No opening to varus or valgus stress.  Nontender along the medial and lateral joint line.  Tender along her patellar tendon.  Tender anteromedially and anterolaterally.    Left knee reveals no effusion.  Negative Lachman.  No posterior drawer.  No pivot shift.  No patellar apprehension.  No significant patellar tilt.  Tender slightly over the patellar tendon fat pad.    RADIOGRAPHS:  I personally reviewed the patient's bilateral knee radiographs obtained today.  The left knee reveals a previous ACL reconstruction with tunnels in good position.  Hardware in good position.  There is no significant cartilage  space narrowing bilaterally.  There is no obvious fracture or loose body bilaterally.    IMPRESSION:  Hipolito William is a 20-year-old female with bilateral anterior knee pain, left greater than right.  I think both knees have a component of patellar tendinopathy as well as fat pad irritation.  I explained to Hipolito that this problem can be quite annoying but is not particularly serious.  Her ACL graft is intact and this was reassuring to her.  I think she could benefit from a formal physical therapy program.  She can work on strengthening.  We can focus on pelvifemoral, core and quad strengthening.  We did discuss injection therapy.  I think I would like to obtain an MRI before we did any type of injections.  I also think this may be unnecessary and she has an excellent chance of improving with rehabilitation.  All of her questions were answered.    PLAN:    1.  I will start Hipolito in physical therapy [place] [facility]  She will concentrate on quad, core, pelvifemoral strengthening.  I would like her to undergo a trial of fat pad taping to see if this improves her symptoms.  2.  If her symptoms do not improve over the next 8-12 weeks, we would consider obtaining an MRI of the left knee to evaluate for patellofemoral chondral wear as well as evaluate her extensive mechanism.  3.  She will try bilateral foot arthritis orthotics.  I think this is a good idea.      Greg Bundy MD        D: 2021   T: 2021   MT: leonel    Name:     PEE WILLIAM  MRN:      [id number]        Account:      [id number]   :      2000           Service Date: 2021       Document: [id number]

## 2021-07-27 ENCOUNTER — OFFICE VISIT (OUTPATIENT)
Dept: OBGYN | Facility: CLINIC | Age: 21
End: 2021-07-27
Payer: COMMERCIAL

## 2021-07-27 VITALS
OXYGEN SATURATION: 100 % | DIASTOLIC BLOOD PRESSURE: 77 MMHG | HEART RATE: 78 BPM | BODY MASS INDEX: 23.24 KG/M2 | SYSTOLIC BLOOD PRESSURE: 123 MMHG | WEIGHT: 154 LBS

## 2021-07-27 DIAGNOSIS — N93.9 ABNORMAL UTERINE BLEEDING (AUB): Primary | ICD-10-CM

## 2021-07-27 DIAGNOSIS — Z97.5 NEXPLANON IN PLACE: ICD-10-CM

## 2021-07-27 DIAGNOSIS — L29.2 VULVAR ITCHING: ICD-10-CM

## 2021-07-27 PROCEDURE — 99213 OFFICE O/P EST LOW 20 MIN: CPT | Performed by: OBSTETRICS & GYNECOLOGY

## 2021-07-27 NOTE — PROGRESS NOTES
"GYN Clinic Visit    Date of visit: 2021     Chief Complaint:   Chief Complaint   Patient presents with     Abnormal Uterine Bleeding       HPI:   Arlen William is a 21 year old  who presents to clinic today to discuss abnormal uterine bleeding with Nexplanon in place.    First Nexplanon placed .  Didn't have a period for the first year with it, but then irregular spotting started.  She tried a few things, as follows:  --Estrogen tablet.  Didn't help at all  --Combined oral contractive pills.  Bleeding was more predictable while she was taking it, but reverted back to irregular spotting once she stopped.    Nexplanon replaced 2021, was replaced a few months early thinking this might help.  Unfortunately, the irregular spotting has continued.    Spearman may make bleeding worse.  Had sex last week and is lightly spotting now.    Chronic BV.  Medications are \"hard on me.\"  Has tried a number of different treatments, and they seem to help temporarily, but nothing lasts too long.  Wondering if BV is causing her chronic spotting.  If that is the case, would like to know what can be done about it.  Asymptomatic today, but would like to be screened.    Hx Chlamydia--, trich--2020.    Medications:  Biotin 10 MG CAPS,   Cholecalciferol (VITAMIN D) 1000 UNITS capsule, Take 1 capsule by mouth daily.  Etonogestrel (NEXPLANON SC),   etonogestrel (NEXPLANON) 68 MG IMPL, 1 each (68 mg) by Subdermal route once for 1 dose  metroNIDAZOLE (METROGEL) 0.75 % vaginal gel, Place 1 applicator (5 g) vaginally daily 5-7 days, and bi-weekly as needed  Multiple Vitamin (MULTIVITAMINS PO), Take 1 tablet by mouth daily.    No current facility-administered medications on file prior to visit.      Allergy:  No Known Allergies  Patient denies food, latex or environmental allergies.     Obstetric History:   OB History    Para Term  AB Living   0 0 0 0 0 0   SAB TAB Ectopic Multiple Live Births   0 0 0 " 0 0   Obstetric Comments   Not currently sexually active.        Gynecologic History:  Menses: See HPI  Patient's last menstrual period was 07/21/2021 (approximate).  STI history: CT, trich  Last Pap: 2020  History of abnormal pap: yes, 2020 ASCUS.  Follow-up due  History of cervical procedures: n/a       Past Medical History:   Diagnosis Date     Abnormal Pap smear of cervix 08/12/2020    see problem list     Asthma, mild persistent      Concussion 1/2012    Impact test 3/12     Mononucleosis 06/2019       Past Surgical History:   Procedure Laterality Date     ARTHROSCOPIC RECONSTRUCTION ANTERIOR CRUCIATE LIGAMENT  8/19/13    left     ESOPHAGOSCOPY, GASTROSCOPY, DUODENOSCOPY (EGD), COMBINED N/A 12/2/2016    Procedure: COMBINED ESOPHAGOSCOPY, GASTROSCOPY, DUODENOSCOPY (EGD), BIOPSY SINGLE OR MULTIPLE;  Surgeon: Laurie Pride MD;  Location:  PEDS SEDATION      HC TOOTH EXTRACTION W/FORCEP         Social History:  Alcohol use  Social History    Substance and Sexual Activity      Alcohol use: Yes        Comment: 0-2 per month     Tobacco use  History   Smoking Status     Never Smoker   Smokeless Tobacco     Never Used     Comment: no smokers at home     Drug use  History   Drug Use No     Sexual history  History   Sexual Activity     Sexual activity: Not Currently     Partners: Male     Birth control/ protection: Implant     Comment: Placed 04/2018         Family History   Problem Relation Age of Onset     Diabetes Mother         gestational      GERD Mother      Gallbladder Disease Mother      Diabetes Maternal Grandmother      Cancer Maternal Grandmother         ovarian and uterine     Hypertension Father      Lipids Father      Hypertension Paternal Grandfather      Family History Negative No family hx of      Asthma No family hx of         Eosinophilic esophagitis.     C.A.D. No family hx of      Breast Cancer No family hx of      Cerebrovascular Disease No family hx of      Cancer - colorectal  No family hx of      Prostate Cancer No family hx of      Alcohol/Drug No family hx of      Allergies No family hx of      Alzheimer Disease No family hx of      Anesthesia Reaction No family hx of      Arthritis No family hx of      Blood Disease No family hx of      Cardiovascular No family hx of      Circulatory No family hx of      Congenital Anomalies No family hx of      Connective Tissue Disorder No family hx of      Depression No family hx of      Endocrine Disease No family hx of      Eye Disorder No family hx of      Genetic Disorder No family hx of      Gastrointestinal Disease No family hx of      Genitourinary Problems No family hx of      Gynecology No family hx of      Musculoskeletal Disorder No family hx of      Anxiety Disorder No family hx of      Mental Illness No family hx of      Substance Abuse No family hx of      Colon Cancer No family hx of      Other Cancer No family hx of      Thyroid Disease No family hx of      Obesity No family hx of      Osteoporosis No family hx of      Unknown/Adopted No family hx of      Hyperlipidemia No family hx of      Coronary Artery Disease No family hx of      Other - See Comments No family hx of        Review of Systems:  Neg except as noted in HPI      Physical Exam:  Vitals:    21 1527   BP: 123/77   Pulse: 78   SpO2: 100%   Weight: 69.9 kg (154 lb)       General:  Pleasant, in no acute distress.  CV:  Normal pulse.  No cyanosis.  Respiratory:  Breathing comfortably.  Ext:  No gross abnormalities.  Neurological:  Normal mental status.  Gait WNL.  Sensation and strength grossly intact  Psych - Appropriate mood and affect.  Pelvic:  Deferred to next visit        Assessment:  Arlen William is a 21 year old  who presents with chief complaint   Chief Complaint   Patient presents with     Abnormal Uterine Bleeding        1. Abnormal uterine bleeding (AUB)  2. Nexplanon in place  Discussed that irregular and unpredictable spotting is very very  common with the Nexplanon.  She has already tried her typical measures to manage this irregular bleeding and has been unsatisfied with this.  Discussed that I would expect a Mirena IUD would give her a more favorable bleeding profile.  We reviewed the Mirena IUD, bleeding profile, mechanism of action, efficacy, and side effects.  She is interested in having a Mirena placed and Nexplanon removed.    3. Vulvar itching  Discouraged patient from doing wet prep today, as is not recommended to treat clue cells in the absence of symptoms.  She is understanding of this recommendation.  We discussed the role of pH in the occurrence of BV.  I suspect that her persistent spotting with her Nexplanon is more likely the cause of her recurrent BV rather than the other way around.  Will defer testing today, but standing order was placed if symptoms return.  Anticipate that symptoms will improve once her bleeding profile is more satisfactory.  - Wet prep - lab collect; Standing    Plan pap with IUD insertion.    Follow up: scheduled for Nexplanon removal and Mirena IUD insertion.    Ruby Zepeda MD    *Previous office notes in regard to her Nexplanon as well as wet prep lab results were reviewed on the day of appointment.*

## 2021-07-28 ENCOUNTER — NURSE TRIAGE (OUTPATIENT)
Dept: NURSING | Facility: CLINIC | Age: 21
End: 2021-07-28

## 2021-07-28 NOTE — TELEPHONE ENCOUNTER
DIAGNOSIS: whole back pain - 2 wks ago tweaked back and has had sharp pain since injury , per pt , No images   APPOINTMENT DATE: 8.3.21   NOTES STATUS DETAILS   OFFICE NOTE from referring provider N/A    OFFICE NOTE from other specialist Internal 7.28.21 Nurse Triage    Sees Dr uBndy   DISCHARGE SUMMARY from hospital N/A    DISCHARGE REPORT from the ER N/A    OPERATIVE REPORT N/A    EMG report N/A    MEDICATION LIST Internal    MRI N/A    DEXA (osteoporosis/bone health) N/A    CT SCAN N/A    XRAYS (IMAGES & REPORTS) N/A Past spine images internal

## 2021-07-28 NOTE — TELEPHONE ENCOUNTER
RN triage   Call from pt   Pt states she has long standing issues -- one leg longer than the other --   Since camping 2 weeks ago - having low back pain and bilateral glut pain   No fall or injury  No numbness or tingling   No bowel or bladder change   Reviewed home care advice   Transferred to      Evette Luna RN  [facility]  Triage Nurse Advisor    COVID 19 Nurse Triage Plan/Patient Instructions    Please be aware that novel coronavirus (COVID-19) may be circulating in the community. If you develop symptoms such as fever, cough, or SOB or if you have concerns about the presence of another infection including coronavirus (COVID-19), please contact your health care provider or visit [email or web address].     Disposition/Instructions    In-Person Visit with provider recommended. Reference Visit Selection Guide.    Thank you for taking steps to prevent the spread of this virus.  o Limit your contact with others.  o Wear a simple mask to cover your cough.  o Wash your hands well and often.    Resources    [facility]: About COVID-19: [email or web address]    CDC: What to Do If You're Sick: www.cdc.gov/coronavirus/2019-ncov/about/steps-when-sick.html    CDC: Ending Home Isolation: www.cdc.gov/coronavirus/2019-ncov/hcp/disposition-in-home-patients.html     CDC: Caring for Someone: www.cdc.gov/coronavirus/2019-ncov/if-you-are-sick/care-for-someone.html     [facility]: Interim Guidance for Hospital Discharge to Home: [email or web address]/hcp/hospdischarge.[email or web address]    [facility] clinical trials (COVID-19 research studies): [email or web address]-trials     Below are the COVID-19 hotlines at the [facility] ([facility]). Interpreters are available.   o For health questions: Call 121-831-3562 or 1-484.470.2358 (7 a.m. to 7 p.m.)  o For questions about schools and childcare: Call 865-469-2557 or 1-376.281.4205 (7 a.m. to 7 p.m.)                      Reason for Disposition    Pain radiates into the thigh or further down the leg, and in both legs    Additional Information    Negative: Passed out (i.e., fainted, collapsed and was not responding)    Negative: Shock suspected (e.g., cold/pale/clammy skin, too weak to stand, low BP, rapid pulse)    Negative: Sounds like a life-threatening emergency to the triager    Negative: SEVERE back pain of sudden onset and age > 60    Negative: SEVERE abdominal pain (e.g., excruciating)    Negative: Abdominal pain and age > 60    Negative: Unable to urinate (or only a few drops) and bladder feels very full    Negative: Loss of bladder or bowel control (urine or bowel incontinence; wetting self, leaking stool) of new onset    Negative: Numbness (loss of sensation) in groin or rectal area    Negative: Pain radiates into groin, scrotum    Negative: Blood in urine (red, pink, or tea-colored)    Negative: Vomiting and pain over lower ribs of back (i.e., flank - kidney area)    Negative: Weakness of a leg or foot (e.g., unable to bear weight, dragging foot)    Negative: Fever > 100.4 F (38.0 C) and flank pain    Negative: Pain or burning with passing urine (urination)    Negative: Numbness in an arm or hand (i.e., loss of sensation) and upper back pain    Negative: Numbness in a leg or foot (i.e., loss of sensation)    Protocols used: BACK PAIN-A-[place]

## 2021-07-29 ENCOUNTER — PATIENT OUTREACH (OUTPATIENT)
Dept: FAMILY MEDICINE | Facility: CLINIC | Age: 21
End: 2021-07-29

## 2021-07-29 DIAGNOSIS — M54.50 LOW BACK PAIN: Primary | ICD-10-CM

## 2021-07-29 PROBLEM — R87.610 ATYPICAL SQUAMOUS CELLS OF UNDETERMINED SIGNIFICANCE (ASCUS) ON PAPANICOLAOU SMEAR OF CERVIX: Status: ACTIVE | Noted: 2020-08-12

## 2021-08-03 ENCOUNTER — PRE VISIT (OUTPATIENT)
Dept: ORTHOPEDICS | Facility: CLINIC | Age: 21
End: 2021-08-03

## 2021-08-09 ENCOUNTER — OFFICE VISIT (OUTPATIENT)
Dept: ORTHOPEDICS | Facility: CLINIC | Age: 21
End: 2021-08-09
Payer: COMMERCIAL

## 2021-08-09 VITALS — BODY MASS INDEX: 23.49 KG/M2 | HEIGHT: 68 IN | WEIGHT: 155 LBS

## 2021-08-09 DIAGNOSIS — M21.41 FLAT FEET, BILATERAL: ICD-10-CM

## 2021-08-09 DIAGNOSIS — M54.50 ACUTE BILATERAL LOW BACK PAIN WITHOUT SCIATICA: Primary | ICD-10-CM

## 2021-08-09 DIAGNOSIS — M21.42 FLAT FEET, BILATERAL: ICD-10-CM

## 2021-08-09 PROCEDURE — 99203 OFFICE O/P NEW LOW 30 MIN: CPT | Performed by: FAMILY MEDICINE

## 2021-08-09 ASSESSMENT — MIFFLIN-ST. JEOR: SCORE: 1511.58

## 2021-08-09 NOTE — LETTER
8/9/2021      RE: Arlen William  [address]  [place] [address]       [facility]  SPORTS & ORTHOPEDIC CLINIC VISIT     Aug 9, 2021    ASSESSMENT & PLAN    21-year-old with prior history of mild scoliosis and leg length discrepancy here with acute bilateral low back pain.  Overall seems to be improving.  Likely mechanical in etiology.    Reviewed imaging and assessment with patient in detail  I recommended physical therapy for her lumbar spine.  We discussed her upcoming PT test.  She feels that she is able to complete the test, though may not be her best performance.  We discussed imaging of her spine today.  However we ultimately decided to defer imaging as she has had her spine imaged multiple times in the past and imaging is not going to affect her assessment today.  However should this become a recurrent problem for her we would recommend complete spine imaging in the eos machine.  She additionally was given a referral to podiatry as she has had difficulty finding an arch support that fits appropriately with her heel lift.  She will likely need a custom orthotic.    Fernando Harrell MD  [facility] SPORTS MEDICINE CLINIC [place]    -----  Chief Complaint   Patient presents with     Spine - Pain     SUBJECTIVE  Arlen William is a/an 21 year old female who is seen as a self referral for evaluation of  Back pain.     The patient is seen by themselves.  The patient is Right handed    7/15/21 - tweaked while camping, only relief was hunched over or laying down.  Has to prepare for PT test for . Needs to keep exercising, but this is challenging with the injuries.  Has been informed of slight scoliosis and leg length discrepancy.  Also has been informed of fallen arches.    Onset: 3 week(s) ago. Patient describes injury as tweaked while camping.  Location of Pain: slightly L sided thoraco-lumbar pain - describes it as generalized. Has some slight pain that travels  laterally along her R hip (changes)  Worsened by: exercise, deadlifting, running,   Better with: time, stretching,   Treatments tried: rest/activity avoidance and stretching, ibuprofen and tylenol, biofreeze  Associated symptoms: no distal numbness or tingling; denies swelling or warmth    Orthopedic/Surgical history: Has history of leg length discrepancy.  Wears heel lift in left shoe which seems to help some.  She also has a history of scoliosis which she reports was detected in fifth grade and followed throughout childhood until high school.  She did not require any bracing or surgery.  Social History/Occupation:       REVIEW OF SYSTEMS:    Do you have fever, chills, weight loss? No    Do you have any vision problems? No    Do you have any chest pain or edema? No    Do you have any shortness of breath or wheezing?  No    Do you have stomach problems? No    Do you have any numbness or focal weakness? No    Do you have diabetes? No    Do you have problems with bleeding or clotting? No    Do you have an rashes or other skin lesions? No    OBJECTIVE:  [facility] 07/21/2021 (Approximate)      General: alert, pleasant, no distress. sitting comfortably in chair  Back/Spine: At rest she sits with a slightly elevated left shoulder, her rotational component of her thoracolumbar curve measures roughly 5 degrees in a counterclockwise direction.  no tenderness to palpation of spinous processes, or paraspinous musculature of lumbar spine. full ROM with flexion, extension, twisting, and side bending without pain. Straight leg raise negative bilaterally. No significant hamstring tightness noted.  No pain in back with KORY testing bilaterally  Neuro: SILT on bilateral lower extremities, can stand on toes and heel walk without difficulty, patellar and achilles reflexes 2+ and symmetric      RADIOLOGY:    No new imaging this visit         Fernando Harrell MD

## 2021-08-09 NOTE — PROGRESS NOTES
[facility]  SPORTS & ORTHOPEDIC CLINIC VISIT     Aug 9, 2021        ASSESSMENT & PLAN    21-year-old with prior history of mild scoliosis and leg length discrepancy here with acute bilateral low back pain.  Overall seems to be improving.  Likely mechanical in etiology.    Reviewed imaging and assessment with patient in detail  I recommended physical therapy for her lumbar spine.  We discussed her upcoming PT test.  She feels that she is able to complete the test, though may not be her best performance.  We discussed imaging of her spine today.  However we ultimately decided to defer imaging as she has had her spine imaged multiple times in the past and imaging is not going to affect her assessment today.  However should this become a recurrent problem for her we would recommend complete spine imaging in the eos machine.  She additionally was given a referral to podiatry as she has had difficulty finding an arch support that fits appropriately with her heel lift.  She will likely need a custom orthotic.    Fernando Harrell MD  [facility] SPORTS MEDICINE CLINIC [place]    -----  Chief Complaint   Patient presents with     Spine - Pain       SUBJECTIVE  Arlen William is a/an 21 year old female who is seen as a self referral for evaluation of  Back pain.     The patient is seen by themselves.  The patient is Right handed    7/15/21 - tweaked while camping, only relief was hunched over or laying down.  Has to prepare for PT test for . Needs to keep exercising, but this is challenging with the injuries.  Has been informed of slight scoliosis and leg length discrepancy.  Also has been informed of fallen arches.    Onset: 3 week(s) ago. Patient describes injury as tweaked while camping.  Location of Pain: slightly L sided thoraco-lumbar pain - describes it as generalized. Has some slight pain that travels laterally along her R hip (changes)  Worsened by: exercise, deadlifting,  running,   Better with: time, stretching,   Treatments tried: rest/activity avoidance and stretching, ibuprofen and tylenol, biofreeze  Associated symptoms: no distal numbness or tingling; denies swelling or warmth    Orthopedic/Surgical history: Has history of leg length discrepancy.  Wears heel lift in left shoe which seems to help some.  She also has a history of scoliosis which she reports was detected in fifth grade and followed throughout childhood until high school.  She did not require any bracing or surgery.  Social History/Occupation:       REVIEW OF SYSTEMS:    Do you have fever, chills, weight loss? No    Do you have any vision problems? No    Do you have any chest pain or edema? No    Do you have any shortness of breath or wheezing?  No    Do you have stomach problems? No    Do you have any numbness or focal weakness? No    Do you have diabetes? No    Do you have problems with bleeding or clotting? No    Do you have an rashes or other skin lesions? No    OBJECTIVE:  [facility] 07/21/2021 (Approximate)      General: alert, pleasant, no distress. sitting comfortably in chair  Back/Spine: At rest she sits with a slightly elevated left shoulder, her rotational component of her thoracolumbar curve measures roughly 5 degrees in a counterclockwise direction.  no tenderness to palpation of spinous processes, or paraspinous musculature of lumbar spine. full ROM with flexion, extension, twisting, and side bending without pain. Straight leg raise negative bilaterally. No significant hamstring tightness noted.  No pain in back with KORY testing bilaterally  Neuro: SILT on bilateral lower extremities, can stand on toes and heel walk without difficulty, patellar and achilles reflexes 2+ and symmetric      RADIOLOGY:    No new imaging this visit

## 2021-08-11 ENCOUNTER — THERAPY VISIT (OUTPATIENT)
Dept: PHYSICAL THERAPY | Facility: CLINIC | Age: 21
End: 2021-08-11
Attending: ORTHOPAEDIC SURGERY
Payer: COMMERCIAL

## 2021-08-11 DIAGNOSIS — M25.562 CHRONIC PAIN OF LEFT KNEE: ICD-10-CM

## 2021-08-11 DIAGNOSIS — G89.29 CHRONIC PAIN OF RIGHT KNEE: ICD-10-CM

## 2021-08-11 DIAGNOSIS — M25.561 CHRONIC PAIN OF RIGHT KNEE: ICD-10-CM

## 2021-08-11 DIAGNOSIS — G89.29 CHRONIC PAIN OF LEFT KNEE: ICD-10-CM

## 2021-08-11 PROCEDURE — 97110 THERAPEUTIC EXERCISES: CPT | Mod: GP | Performed by: PHYSICAL THERAPIST

## 2021-08-11 PROCEDURE — 97161 PT EVAL LOW COMPLEX 20 MIN: CPT | Mod: GP | Performed by: PHYSICAL THERAPIST

## 2021-08-11 PROCEDURE — 97530 THERAPEUTIC ACTIVITIES: CPT | Mod: GP | Performed by: PHYSICAL THERAPIST

## 2021-08-11 NOTE — PROGRESS NOTES
"  RESPONSIVENESS TO LEE TAPE:  Lee Taping Trial    Pre/posttest activity Squatting/ambulation   Taping technique(s) trialed LOW   Numerical Pain Scale 4/10 to 2/10   Level of reported stability (0/10= full confidence in knee; 10/10=no confidence in knee)    Improvement in movement pattern with tape on Improved depth to squatting        PT MODIFIABLE FACTORS PRESENT NOT PRESENT   Proximal LE/Trunk mm weakness X    Quadriceps muscle dysfunction/weakness X    Poor postural stability X    Poor dynamic movement patterns X    Restricted ankle DF  X   Previous PF PT Interventions  X       Physical Therapy Initial Evaluation: Subjective History     Injury/Condition Details:  Presenting Complaint Knee pain. bilateral   Onset Timing/Date Last couple weeks.   Mechanism Has flare ups when she runs or squats. Other that it feels good.   Has history of flat feet, scoliosis > \"the army destroyed my body\"     Symptom Behavior Details    Primary Symptoms Intermittent pain - pain in patellar tendons bilaterally but mostly L patellar tendon.    Worst Pain 3/10 (after running)   Symptom Provocators Running longer distances and back squatting. Driving for long periods of time also aggravates.    Best Pain 1/10    Symptom Relievers Resting    Time of day dependent? Not time dependent.    Recent symptom change? symptoms worsening     Prior Testing/Intervention for current condition:  Prior Tests  No prior tests   Prior Treatment Has been to PT off and on for various orthopedic issues since her ACL reconstruction procedure when she was 13.      Lifestyle & General Medical History:  Employment [employer]. Walks around a lot.   Usual physical activities  (within past year) Very active. In the [employer] and works out 4-5 days/week.    Orthopaedic history L ACL reconstructed when 12 or 13 years old. Tweaked back a month ago. Slight scoliosis. Hips are not level. Leg length discrepancy.   Notable medical history See Epic Chart   Patient " Reported Health Has started working out more in the last month due to boot camp coming up this weekend.             Lower Extremity Exam    Dynamic Movement Screen:  2 leg stance: Decreased arch height bilaterally, forward facing patella  2 leg squat:Excessive anterior knee excursion (reduced posterior hip excursion) and Reduced squat depth d/t reported pain at knee    1 leg stance:   Right: proprioceptive challenge  Left: proprioceptive challenge    1 leg squat:   Right: proprioceptive challenge, excessive femoral IR/ADD and excessive anterior knee excursion  Left: proprioceptive challenge, excessive femoral IR/ADD and excessive anterior knee excursion    Gait: Non antalgic, is altered without shoes due to the LLD.     Patellofemoral Joint Examination:  Test Right Left   Patellar Orientation:   90 deg flexion neutral neutral   OKC Patellar Tracking Increased lateralization into TKE and Crepitus Increased lateralization into TKE and Crepitus   Patellar Orientation:  0 deg flexion neutral neutral   Quadrant Mobility (Med:Lat) 2:2  2:2   Effusion 0 0   Quad Activation Good Good     Knee Joint AROM: no limitations, tightness over anterior knee with OP into flexion.     Palpation:   Tender to palpation at the following structures: patellar tendon and fat pads    Lower Extremity Muscle Strength (x/5)   Right Left   Hip ER 4+/5 4+/5   Hip IR 4+/5 4+/5   Hip ABD 4+/5 4+/5   Hip ADD 4+/5 4+/5   Hip Ext 4+/5 4+/5   Knee Flex 4+/5 4+/5     Lower Extremity Flexibility Screen:   Right Left   Hamstring + +   KORY - +   Goalie Stretch - -   Hip Flexor + +   ITB/Lat Hip in SL - +   Quadriceps + +   Gastroc/ Soleus + +   - = normal  + = mild tightness  ++ = moderate tightness  +++ = significant tightness    Core screen: lacking stability through the pelvis   Assessment/Plan:  Patient is a 21 year old female with both sides knee complaints.    Patient has the following significant findings with corresponding treatment plan.                 Diagnosis 1:  Bilateral PFPS  Pain -  home program  Decreased ROM/flexibility - manual therapy, therapeutic exercise and home program  Decreased joint mobility - manual therapy, therapeutic exercise and home program  Decreased strength - therapeutic exercise, therapeutic activities and home program  Decreased proprioception - neuro re-education, therapeutic activities and home program  Impaired gait - gait training and home program  Impaired muscle performance - neuro re-education and home program  Decreased function - therapeutic activities and home program    Therapy Evaluation Codes:   1) History comprised of:   Personal factors that impact the plan of care:      None.    Comorbidity factors that impact the plan of care are:      None.     Medications impacting care: None.  2) Examination of Body Systems comprised of:   Body structures and functions that impact the plan of care:      Knee.   Activity limitations that impact the plan of care are:      Lifting, Running, Sports, Squatting/kneeling and Stairs.  3) Clinical presentation characteristics are:   Stable/Uncomplicated.  4) Decision-Making    Low complexity using standardized patient assessment instrument and/or measureable assessment of functional outcome.  Cumulative Therapy Evaluation is: Low complexity.    Previous and current functional limitations:  (See Goal Flow Sheet for this information)    Short term and Long term goals: (See Goal Flow Sheet for this information)     Communication ability:  Patient appears to be able to clearly communicate and understand verbal and written communication and follow directions correctly.  Treatment Explanation - The following has been discussed with the patient:   RX ordered/plan of care  Anticipated outcomes  Possible risks and side effects  This patient would benefit from PT intervention to resume normal activities.   Rehab potential is good.    Frequency:  1 X week, once daily  Duration:  for 6  weeks  Discharge Plan:  Achieve all LTG.  Independent in home treatment program.  Reach maximal therapeutic benefit.    Please refer to the daily flowsheet for treatment today, total treatment time and time spent performing 1:1 timed codes.

## 2021-08-13 PROBLEM — M25.561 CHRONIC PAIN OF RIGHT KNEE: Status: ACTIVE | Noted: 2021-08-13

## 2021-08-13 PROBLEM — G89.29 CHRONIC PAIN OF RIGHT KNEE: Status: ACTIVE | Noted: 2021-08-13

## 2021-08-13 PROBLEM — G89.29 CHRONIC PAIN OF LEFT KNEE: Status: ACTIVE | Noted: 2021-08-13

## 2021-08-13 PROBLEM — M25.562 CHRONIC PAIN OF LEFT KNEE: Status: ACTIVE | Noted: 2021-08-13

## 2021-08-17 ENCOUNTER — THERAPY VISIT (OUTPATIENT)
Dept: PHYSICAL THERAPY | Facility: CLINIC | Age: 21
End: 2021-08-17
Payer: COMMERCIAL

## 2021-08-17 DIAGNOSIS — G89.29 CHRONIC PAIN OF RIGHT KNEE: ICD-10-CM

## 2021-08-17 DIAGNOSIS — G89.29 CHRONIC PAIN OF LEFT KNEE: ICD-10-CM

## 2021-08-17 DIAGNOSIS — M25.561 CHRONIC PAIN OF RIGHT KNEE: ICD-10-CM

## 2021-08-17 DIAGNOSIS — M25.562 CHRONIC PAIN OF LEFT KNEE: ICD-10-CM

## 2021-08-17 PROCEDURE — 97530 THERAPEUTIC ACTIVITIES: CPT | Mod: GP | Performed by: PHYSICAL THERAPIST

## 2021-08-17 PROCEDURE — 97110 THERAPEUTIC EXERCISES: CPT | Mod: GP | Performed by: PHYSICAL THERAPIST

## 2021-08-24 ENCOUNTER — THERAPY VISIT (OUTPATIENT)
Dept: PHYSICAL THERAPY | Facility: CLINIC | Age: 21
End: 2021-08-24
Payer: COMMERCIAL

## 2021-08-24 DIAGNOSIS — G89.29 CHRONIC PAIN OF RIGHT KNEE: ICD-10-CM

## 2021-08-24 DIAGNOSIS — M25.562 CHRONIC PAIN OF LEFT KNEE: ICD-10-CM

## 2021-08-24 DIAGNOSIS — M25.561 CHRONIC PAIN OF RIGHT KNEE: ICD-10-CM

## 2021-08-24 DIAGNOSIS — G89.29 CHRONIC PAIN OF LEFT KNEE: ICD-10-CM

## 2021-08-24 PROCEDURE — 97110 THERAPEUTIC EXERCISES: CPT | Mod: GP | Performed by: PHYSICAL THERAPIST

## 2021-08-24 PROCEDURE — 97530 THERAPEUTIC ACTIVITIES: CPT | Mod: GP | Performed by: PHYSICAL THERAPIST

## 2021-08-24 ASSESSMENT — ACTIVITIES OF DAILY LIVING (ADL)
HOW_WOULD_YOU_RATE_THE_OVERALL_FUNCTION_OF_YOUR_KNEE_DURING_YOUR_USUAL_DAILY_ACTIVITIES?: NEARLY NORMAL
GO UP STAIRS: ACTIVITY IS NOT DIFFICULT
GIVING WAY, BUCKLING OR SHIFTING OF KNEE: I DO NOT HAVE THE SYMPTOM
KNEEL ON THE FRONT OF YOUR KNEE: ACTIVITY IS FAIRLY DIFFICULT
WALK: ACTIVITY IS NOT DIFFICULT
HOW_WOULD_YOU_RATE_THE_CURRENT_FUNCTION_OF_YOUR_KNEE_DURING_YOUR_USUAL_DAILY_ACTIVITIES_ON_A_SCALE_FROM_0_TO_100_WITH_100_BEING_YOUR_LEVEL_OF_KNEE_FUNCTION_PRIOR_TO_YOUR_INJURY_AND_0_BEING_THE_INABILITY_TO_PERFORM_ANY_OF_YOUR_USUAL_DAILY_ACTIVITIES?: 95
LIMPING: I DO NOT HAVE THE SYMPTOM
KNEE_ACTIVITY_OF_DAILY_LIVING_SCORE: 84.29
RAW_SCORE: 59
KNEE_ACTIVITY_OF_DAILY_LIVING_SUM: 59
WEAKNESS: THE SYMPTOM AFFECTS MY ACTIVITY SLIGHTLY
GO DOWN STAIRS: ACTIVITY IS NOT DIFFICULT
AS_A_RESULT_OF_YOUR_KNEE_INJURY,_HOW_WOULD_YOU_RATE_YOUR_CURRENT_LEVEL_OF_DAILY_ACTIVITY?: NORMAL
SQUAT: ACTIVITY IS MINIMALLY DIFFICULT
RISE FROM A CHAIR: ACTIVITY IS NOT DIFFICULT
PAIN: THE SYMPTOM AFFECTS MY ACTIVITY SLIGHTLY
STAND: ACTIVITY IS SOMEWHAT DIFFICULT
STIFFNESS: I HAVE THE SYMPTOM BUT IT DOES NOT AFFECT MY ACTIVITY
SIT WITH YOUR KNEE BENT: ACTIVITY IS NOT DIFFICULT
SWELLING: I DO NOT HAVE THE SYMPTOM

## 2021-08-26 ENCOUNTER — OFFICE VISIT (OUTPATIENT)
Dept: OBGYN | Facility: CLINIC | Age: 21
End: 2021-08-26
Payer: COMMERCIAL

## 2021-08-26 VITALS
SYSTOLIC BLOOD PRESSURE: 122 MMHG | HEART RATE: 63 BPM | OXYGEN SATURATION: 98 % | WEIGHT: 156 LBS | DIASTOLIC BLOOD PRESSURE: 71 MMHG | BODY MASS INDEX: 23.72 KG/M2

## 2021-08-26 DIAGNOSIS — Z30.46 ENCOUNTER FOR NEXPLANON REMOVAL: ICD-10-CM

## 2021-08-26 DIAGNOSIS — Z12.4 CERVICAL CANCER SCREENING: ICD-10-CM

## 2021-08-26 DIAGNOSIS — Z30.430 ENCOUNTER FOR IUD INSERTION: Primary | ICD-10-CM

## 2021-08-26 LAB — HCG UR QL: NEGATIVE

## 2021-08-26 PROCEDURE — 58300 INSERT INTRAUTERINE DEVICE: CPT | Performed by: OBSTETRICS & GYNECOLOGY

## 2021-08-26 PROCEDURE — G0145 SCR C/V CYTO,THINLAYER,RESCR: HCPCS | Performed by: OBSTETRICS & GYNECOLOGY

## 2021-08-26 PROCEDURE — 81025 URINE PREGNANCY TEST: CPT | Performed by: OBSTETRICS & GYNECOLOGY

## 2021-08-26 PROCEDURE — 11982 REMOVE DRUG IMPLANT DEVICE: CPT | Performed by: OBSTETRICS & GYNECOLOGY

## 2021-08-26 NOTE — PROGRESS NOTES
IUD Insertion:  CONSULT:    Is a pregnancy test required: Yes.  Was it positive or negative?  Negative  Was a consent obtained?  Yes    Subjective: Arlen William is a 21 year old  presents for IUD and desires Mirena type IUD.    Patient has been given the opportunity to ask questions about all forms of birth control, including all options appropriate for Arlen William. Discussed that no method of birth control, except abstinence is 100% effective against pregnancy or sexually transmitted infection.     Arlen William understands she may have the IUD removed at any time. IUD should be removed by a health care provider.    The entire insertion procedure was reviewed with the patient, including care after placement.    No LMP recorded. Patient has had an implant.     hCG Urine Qualitative   Date Value Ref Range Status   2021 Negative Negative Final     Comment:     This test is for screening purposes.  Results should be interpreted along with the clinical picture.  Confirmation testing is available if warranted by ordering [id number], HCG Quantitative Pregnancy.       /71   Pulse 63   Wt 70.8 kg (156 lb)   SpO2 98%   Breastfeeding No   BMI 23.72 kg/m      Normal appearing external genitalia, normal appearing vaginal mucosa, normal appearing cervix, on bimanual exam anteverted uterus, mobile.      PROCEDURE NOTE: -- IUD Insertion    Reason for Insertion: contraception    Premedicated with ibuprofen.  Under sterile technique, cervix was visualized with speculum and prepped with Betadine solution swab x 3. Tenaculum was placed for stability. The uterus was gently straightened and sounded to 7.0 cm with endometrial pipelle. Mirena IUD prepared for placement, and IUD inserted according to 's instructions without difficulty or significant resitance, and deployed at the fundus. The strings were visualized and trimmed to 3.0 cm from the external os. Tenaculum was removed and  hemostasis noted. Speculum removed.  Patient tolerated procedure well.    Lot # [id number]  Exp: 11/2023    EBL: minimal    Complications: none    ASSESSMENT:     ICD-10-CM    1. Encounter for IUD insertion  Z30.430 levonorgestrel (MIRENA) 20 MCG/24HR IUD     levonorgestrel (MIRENA) 20 MCG/24HR IUD 20 mcg     INSERTION INTRAUTERINE DEVICE     HCG Qual, Urine ([id number])     CANCELED: HCG Qual, Urine ([id number])   2. Encounter for Nexplanon removal  Z30.46 REMOVAL NEXPLANON        PLAN:    Given 's handouts, including when to have IUD removed, list of danger s/sx, side effects and follow up recommended. Encouraged condom use for prevention of STD. Back up contraception advised for 7 days if progestin method. Advised to call for any fever, for prolonged or severe pain or bleeding, abnormal vaginal discharge, or unable to palpate strings. She was advised to use pain medications (ibuprofen) as needed for mild to moderate pain. Advised to follow-up in clinic in 4-6 weeks for IUD string check if unable to find strings or as directed by provider.     Nexplanon Removal:   Was a consent obtained?  Yes    Arlen William is here for removal of etonogestrel implant Nexplanon    Indication: irregular vaginal bleeding      Preoperative Diagnosis: etonogestrel implant  Postoperative Diagnosis: etonogestrel implant removed    Technique: On the left arm  Skin prep Betadine  Anesthesia 1% lidocaine  Procedure: Small incision (<5mm) was made at distal end of palpable implant, curved hemostat or mosquito forceps was used to isolate the implant and bring it to the incision,  and the implant was removed intact.    EBL: minimal  Complications:  No  Tolerance:  Pt tolerated procedure well and was in stable condition.   Dressing:    A pressure bandage was placed for the next 24 hours.    Contraception was discussed and patient chose the following method Mirena IUD      Follow up: Pt was instructed to call if bleeding, severe pain  or foul smell.     Charlotte Collins MD

## 2021-09-03 ENCOUNTER — PATIENT OUTREACH (OUTPATIENT)
Dept: OBGYN | Facility: CLINIC | Age: 21
End: 2021-09-03

## 2021-09-03 LAB
BKR LAB AP GYN ADEQUACY: NORMAL
BKR LAB AP GYN INTERPRETATION: NORMAL
BKR LAB AP HPV REFLEX: NO
BKR LAB AP PREVIOUS ABNORMAL: NORMAL
PATH REPORT.COMMENTS IMP SPEC: NORMAL
PATH REPORT.RELEVANT HX SPEC: NORMAL

## 2021-09-08 ENCOUNTER — OFFICE VISIT (OUTPATIENT)
Dept: ORTHOPEDICS | Facility: CLINIC | Age: 21
End: 2021-09-08
Attending: FAMILY MEDICINE
Payer: COMMERCIAL

## 2021-09-08 VITALS — BODY MASS INDEX: 23.66 KG/M2 | HEIGHT: 68 IN | WEIGHT: 156.09 LBS

## 2021-09-08 DIAGNOSIS — M21.41 FLAT FEET, BILATERAL: ICD-10-CM

## 2021-09-08 DIAGNOSIS — M21.42 FLAT FEET, BILATERAL: ICD-10-CM

## 2021-09-08 DIAGNOSIS — M21.70 LEG LENGTH DISCREPANCY: Primary | ICD-10-CM

## 2021-09-08 PROCEDURE — 99203 OFFICE O/P NEW LOW 30 MIN: CPT | Performed by: PODIATRIST

## 2021-09-08 ASSESSMENT — MIFFLIN-ST. JEOR: SCORE: 1516.37

## 2021-09-08 NOTE — LETTER
9/8/2021         RE: Arlen William  [address]  [place] [address]        Dear Colleague,    Thank you for referring your patient, Arlen William, to the [facility] [place]. Please see a copy of my visit note below.    Date of Service: 9/8/2021    Chief Complaint   Patient presents with     Consult     Flat feet fallen arches, pronation Ref. Dr. Harrell          HPI: Arlen is a 21 year old female who presents today for further evaluation of limb length discrepancy.  She was recently seen by Dr. Harrell in sports medicine.  He was working her up for spine.  She was sent here to be assessed for custom orthotic as she has a difficult time finding a support that fits with her heel lift.  She has a heel lift for limb length discrepancy.  She has been wearing this for decades.    Review of Systems: No nausea, vomiting, diarrhea, fever, chills, night sweats, shortness of breath, chest pain.    PMH:   Past Medical History:   Diagnosis Date     Abnormal Pap smear of cervix 08/12/2020    see problem list     Asthma, mild persistent      Concussion 1/2012    Impact test 3/12     Mononucleosis 06/2019       PSxH:   Past Surgical History:   Procedure Laterality Date     ARTHROSCOPIC RECONSTRUCTION ANTERIOR CRUCIATE LIGAMENT  8/19/13    left     ESOPHAGOSCOPY, GASTROSCOPY, DUODENOSCOPY (EGD), COMBINED N/A 12/2/2016    Procedure: COMBINED ESOPHAGOSCOPY, GASTROSCOPY, DUODENOSCOPY (EGD), BIOPSY SINGLE OR MULTIPLE;  Surgeon: Laurie Pride MD;  Location:  PEDS SEDATION      HC TOOTH EXTRACTION W/FORCEP         Allergies: Patient has no known allergies.    SH:   Social History     Socioeconomic History     Marital status: Single     Spouse name: Not on file     Number of children: Not on file     Years of education: Not on file     Highest education level: Not on file   Occupational History     Comment: [employer] sometimes    Tobacco Use     Smoking status: Never  Smoker     Smokeless tobacco: Never Used     Tobacco comment: no smokers at home   Substance and Sexual Activity     Alcohol use: Yes     Comment: 0-2 per month      Drug use: No     Sexual activity: Not Currently     Partners: Male     Birth control/protection: Implant     Comment: Placed 04/2018   Other Topics Concern     Parent/sibling w/ CABG, MI or angioplasty before 65F 55M? Not Asked   Social History Narrative     Not on file     Social Determinants of Health     Financial Resource Strain:      Difficulty of Paying Living Expenses:    Food Insecurity:      Worried About Running Out of Food in the Last Year:      Ran Out of Food in the Last Year:    Transportation Needs:      Lack of Transportation (Medical):      Lack of Transportation (Non-Medical):    Physical Activity:      Days of Exercise per Week:      Minutes of Exercise per Session:    Stress:      Feeling of Stress :    Social Connections:      Frequency of Communication with Friends and Family:      Frequency of Social Gatherings with Friends and Family:      Attends [employer] Services:      Active Member of Clubs or Organizations:      Attends Club or Organization Meetings:      Marital Status:    Intimate Partner Violence:      Fear of Current or Ex-Partner:      Emotionally Abused:      Physically Abused:      Sexually Abused:        FH:   Family History   Problem Relation Age of Onset     Diabetes Mother         gestational      GERD Mother      Gallbladder Disease Mother      Diabetes Maternal Grandmother      Cancer Maternal Grandmother         ovarian and uterine     Hypertension Father      Lipids Father      Hypertension Paternal Grandfather      Family History Negative No family hx of      Asthma No family hx of         Eosinophilic esophagitis.     C.A.D. No family hx of      Breast Cancer No family hx of      Cerebrovascular Disease No family hx of      Cancer - colorectal No family hx of      Prostate Cancer No family hx of       Alcohol/Drug No family hx of      Allergies No family hx of      Alzheimer Disease No family hx of      Anesthesia Reaction No family hx of      Arthritis No family hx of      Blood Disease No family hx of      Cardiovascular No family hx of      Circulatory No family hx of      Congenital Anomalies No family hx of      Connective Tissue Disorder No family hx of      Depression No family hx of      Endocrine Disease No family hx of      Eye Disorder No family hx of      Genetic Disorder No family hx of      Gastrointestinal Disease No family hx of      Genitourinary Problems No family hx of      Gynecology No family hx of      Musculoskeletal Disorder No family hx of      Anxiety Disorder No family hx of      Mental Illness No family hx of      Substance Abuse No family hx of      Colon Cancer No family hx of      Other Cancer No family hx of      Thyroid Disease No family hx of      Obesity No family hx of      Osteoporosis No family hx of      Unknown/Adopted No family hx of      Hyperlipidemia No family hx of      Coronary Artery Disease No family hx of      Other - See Comments No family hx of        Objective:  Data Unavailable Data Unavailable Data Unavailable Data Unavailable Data Unavailable 0 lbs 0 oz    PT and DP pulses are 2/4 bilaterally. CRT is instant.  Positive pedal hair.   Gross sensation is intact bilaterally.   Equinus is not noted bilaterally. No pain with active or passive ROM of the ankle, MTJ, 1st ray, or halluces bilaterally,.  Measuring from umbilicus to medial malleolus, the left leg measures at 101 cm and the right leg measures at 103 cm.  Gait is normal.  Slight hip tilt noted.  Collapse of bilateral medial feet at the TN joint.  No pain noted today along the course of the PT, peroneal, Achilles tendons bilaterally.  Nails normal bilaterally. No open lesions are noted.     Assessment: Hipolito is a 21-year-old with bilateral flat feet with collapse of the TN joints bilaterally.  She also has a  short left side.  She does wear a soft heel lift on the side.  I recommend that she get a pair of custom orthotics with a heel lift on the left.  She agrees to this.    Plan:  - Pt seen and evaluated.  -Orthotics were molded and sent to the lab.  -She should continue activity as tolerated.  -See again in 2 months virtually.       Francis Evans DPM

## 2021-09-08 NOTE — PROGRESS NOTES
Date of Service: 9/8/2021    Chief Complaint   Patient presents with     Consult     Flat feet fallen arches, pronation Ref. Dr. Harrell          HPI: Arlen is a 21 year old female who presents today for further evaluation of limb length discrepancy.  She was recently seen by Dr. Harrell in sports medicine.  He was working her up for spine.  She was sent here to be assessed for custom orthotic as she has a difficult time finding a support that fits with her heel lift.  She has a heel lift for limb length discrepancy.  She has been wearing this for decades.    Review of Systems: No nausea, vomiting, diarrhea, fever, chills, night sweats, shortness of breath, chest pain.    PMH:   Past Medical History:   Diagnosis Date     Abnormal Pap smear of cervix 08/12/2020    see problem list     Asthma, mild persistent      Concussion 1/2012    Impact test 3/12     Mononucleosis 06/2019       PSxH:   Past Surgical History:   Procedure Laterality Date     ARTHROSCOPIC RECONSTRUCTION ANTERIOR CRUCIATE LIGAMENT  8/19/13    left     ESOPHAGOSCOPY, GASTROSCOPY, DUODENOSCOPY (EGD), COMBINED N/A 12/2/2016    Procedure: COMBINED ESOPHAGOSCOPY, GASTROSCOPY, DUODENOSCOPY (EGD), BIOPSY SINGLE OR MULTIPLE;  Surgeon: Laurie Pride MD;  Location:  [facility]      HC TOOTH EXTRACTION W/FORCEP         Allergies: Patient has no known allergies.    SH:   Social History     Socioeconomic History     Marital status: Single     Spouse name: Not on file     Number of children: Not on file     Years of education: Not on file     Highest education level: Not on file   Occupational History     Comment: [employer] stand sometimes    Tobacco Use     Smoking status: Never Smoker     Smokeless tobacco: Never Used     Tobacco comment: no smokers at home   Substance and Sexual Activity     Alcohol use: Yes     Comment: 0-2 per month      Drug use: No     Sexual activity: Not Currently     Partners: Male     Birth control/protection:  Implant     Comment: Placed 04/2018   Other Topics Concern     Parent/sibling w/ CABG, MI or angioplasty before 65F 55M? Not Asked   Social History Narrative     Not on file     Social Determinants of Health     Financial Resource Strain:      Difficulty of Paying Living Expenses:    Food Insecurity:      Worried About Running Out of Food in the Last Year:      Ran Out of Food in the Last Year:    Transportation Needs:      Lack of Transportation (Medical):      Lack of Transportation (Non-Medical):    Physical Activity:      Days of Exercise per Week:      Minutes of Exercise per Session:    Stress:      Feeling of Stress :    Social Connections:      Frequency of Communication with Friends and Family:      Frequency of Social Gatherings with Friends and Family:      Attends [employer] Services:      Active Member of Clubs or Organizations:      Attends Club or Organization Meetings:      Marital Status:    Intimate Partner Violence:      Fear of Current or Ex-Partner:      Emotionally Abused:      Physically Abused:      Sexually Abused:        FH:   Family History   Problem Relation Age of Onset     Diabetes Mother         gestational      GERD Mother      Gallbladder Disease Mother      Diabetes Maternal Grandmother      Cancer Maternal Grandmother         ovarian and uterine     Hypertension Father      Lipids Father      Hypertension Paternal Grandfather      Family History Negative No family hx of      Asthma No family hx of         Eosinophilic esophagitis.     C.A.D. No family hx of      Breast Cancer No family hx of      Cerebrovascular Disease No family hx of      Cancer - colorectal No family hx of      Prostate Cancer No family hx of      Alcohol/Drug No family hx of      Allergies No family hx of      Alzheimer Disease No family hx of      Anesthesia Reaction No family hx of      Arthritis No family hx of      Blood Disease No family hx of      Cardiovascular No family hx of      Circulatory No family  hx of      Congenital Anomalies No family hx of      Connective Tissue Disorder No family hx of      Depression No family hx of      Endocrine Disease No family hx of      Eye Disorder No family hx of      Genetic Disorder No family hx of      Gastrointestinal Disease No family hx of      Genitourinary Problems No family hx of      Gynecology No family hx of      Musculoskeletal Disorder No family hx of      Anxiety Disorder No family hx of      Mental Illness No family hx of      Substance Abuse No family hx of      Colon Cancer No family hx of      Other Cancer No family hx of      Thyroid Disease No family hx of      Obesity No family hx of      Osteoporosis No family hx of      Unknown/Adopted No family hx of      Hyperlipidemia No family hx of      Coronary Artery Disease No family hx of      Other - See Comments No family hx of        Objective:  Data Unavailable Data Unavailable Data Unavailable Data Unavailable Data Unavailable 0 lbs 0 oz    PT and DP pulses are 2/4 bilaterally. CRT is instant.  Positive pedal hair.   Gross sensation is intact bilaterally.   Equinus is not noted bilaterally. No pain with active or passive ROM of the ankle, MTJ, 1st ray, or halluces bilaterally,.  Measuring from umbilicus to medial malleolus, the left leg measures at 101 cm and the right leg measures at 103 cm.  Gait is normal.  Slight hip tilt noted.  Collapse of bilateral medial feet at the TN joint.  No pain noted today along the course of the PT, peroneal, Achilles tendons bilaterally.  Nails normal bilaterally. No open lesions are noted.     Assessment: Hipolito is a 21-year-old with bilateral flat feet with collapse of the TN joints bilaterally.  She also has a short left side.  She does wear a soft heel lift on the side.  I recommend that she get a pair of custom orthotics with a heel lift on the left.  She agrees to this.    Plan:  - Pt seen and evaluated.  -Orthotics were molded and sent to the lab.  -She should continue  activity as tolerated.  -See again in 2 months virtually.

## 2021-09-08 NOTE — NURSING NOTE
"Reason For Visit:   Chief Complaint   Patient presents with     Consult     Flat feet fallen arches, pronation Ref. Dr. Harrell       Ht 1.727 m (5' 7.99\")   Wt 70.8 kg (156 lb 1.4 oz)   BMI 23.74 kg/m      Pain Assessment  Patient Currently in Pain: Chandanaies    Ludmila Stallings, ATC    "

## 2021-09-17 ENCOUNTER — E-VISIT (OUTPATIENT)
Dept: URGENT CARE | Facility: CLINIC | Age: 21
End: 2021-09-17
Payer: COMMERCIAL

## 2021-09-17 DIAGNOSIS — J40 BRONCHITIS: Primary | ICD-10-CM

## 2021-09-17 PROCEDURE — 99421 OL DIG E/M SVC 5-10 MIN: CPT | Performed by: EMERGENCY MEDICINE

## 2021-09-17 RX ORDER — DOXYCYCLINE HYCLATE 100 MG
100 TABLET ORAL 2 TIMES DAILY
Qty: 20 TABLET | Refills: 0 | Status: SHIPPED | OUTPATIENT
Start: 2021-09-17 | End: 2021-09-27

## 2021-09-17 NOTE — PATIENT INSTRUCTIONS
"    Dear Arlen William    After reviewing your responses, I've been able to diagnose you with \"Bronchitis\" which is a common infection of your lungs. While this is most commonly caused by a virus, the symptoms you have given suggest you should be treated with antibiotics.     I have sent doxycycline to your pharmacy to treat this infection.     It is important that you take all of your prescribed medication even if your symptoms are improving after a few doses. Taking all of your medicine helps prevent the symptoms from returning.     If your symptoms worsen, you develop chest pain or shortness of breath, fevers over 101, or are not improving in 5 days, please contact your primary care provider for an appointment or visit any of our convenient Walk-in Care or Urgent Care Centers to be seen which can be found on our website here.    Thanks again for choosing us as your health care partner,    Sumanth Monroe MD    "

## 2021-09-18 ENCOUNTER — VIRTUAL VISIT (OUTPATIENT)
Dept: URGENT CARE | Facility: CLINIC | Age: 21
End: 2021-09-18
Payer: COMMERCIAL

## 2021-09-18 DIAGNOSIS — Z53.9 ERRONEOUS ENCOUNTER--DISREGARD: Primary | ICD-10-CM

## 2021-09-18 NOTE — PROGRESS NOTES
Erroneous encounter. Pt completed evisit yesterday. Did not see that other provider finished it and sent in doxy. Advised in person evaluation if no better or worsening in a couple days.

## 2021-09-24 ENCOUNTER — THERAPY VISIT (OUTPATIENT)
Dept: PHYSICAL THERAPY | Facility: CLINIC | Age: 21
End: 2021-09-24
Payer: COMMERCIAL

## 2021-09-24 DIAGNOSIS — G89.29 CHRONIC PAIN OF RIGHT KNEE: ICD-10-CM

## 2021-09-24 DIAGNOSIS — M25.561 CHRONIC PAIN OF RIGHT KNEE: ICD-10-CM

## 2021-09-24 DIAGNOSIS — M25.562 CHRONIC PAIN OF LEFT KNEE: ICD-10-CM

## 2021-09-24 DIAGNOSIS — G89.29 CHRONIC PAIN OF LEFT KNEE: ICD-10-CM

## 2021-09-24 PROCEDURE — 97110 THERAPEUTIC EXERCISES: CPT | Mod: GP | Performed by: PHYSICAL THERAPIST

## 2021-09-24 PROCEDURE — 97530 THERAPEUTIC ACTIVITIES: CPT | Mod: GP | Performed by: PHYSICAL THERAPIST

## 2021-09-27 DIAGNOSIS — Z30.012 EMERGENCY CONTRACEPTION: Primary | ICD-10-CM

## 2021-09-27 RX ORDER — LEVONORGESTREL 1.5 MG/1
1.5 TABLET ORAL ONCE
Qty: 1 TABLET | Refills: 0 | Status: SHIPPED | OUTPATIENT
Start: 2021-09-27 | End: 2021-10-05

## 2021-09-27 NOTE — TELEPHONE ENCOUNTER
Pt has IUD placed August 2021.  She had intercourse 9/24/21 and is requesting a plan B.  She doesn't have any reason to believe that the IUD is out other than she can't feel the strings, but she would like some reassurance.  Are you ok with sending rx for her?  Ban Hu RN      Date/Time of unprotected intercourse: 9/24 evening  History of any of the following:  Stroke:    No  Breast Cancer:   No  Unexplained Vaginal Bleeding:   No  Blood clot:   No  Serious medical disorder such as: diabetes, heart disease, kidney disease, high blood pressure, or cancer of the reproductive organs?:    No  Is there any reason to think that Emergency Contraceptive Pills might pose a health risk for you?   No

## 2021-10-02 ENCOUNTER — HEALTH MAINTENANCE LETTER (OUTPATIENT)
Age: 21
End: 2021-10-02

## 2021-10-05 ENCOUNTER — OFFICE VISIT (OUTPATIENT)
Dept: MIDWIFE SERVICES | Facility: CLINIC | Age: 21
End: 2021-10-05
Payer: COMMERCIAL

## 2021-10-05 VITALS
HEART RATE: 80 BPM | DIASTOLIC BLOOD PRESSURE: 80 MMHG | HEIGHT: 68 IN | WEIGHT: 161.4 LBS | SYSTOLIC BLOOD PRESSURE: 117 MMHG | BODY MASS INDEX: 24.46 KG/M2

## 2021-10-05 DIAGNOSIS — N76.0 BACTERIAL VAGINOSIS: ICD-10-CM

## 2021-10-05 DIAGNOSIS — Z11.3 ROUTINE SCREENING FOR STI (SEXUALLY TRANSMITTED INFECTION): ICD-10-CM

## 2021-10-05 DIAGNOSIS — N94.89 VAGINAL BURNING: ICD-10-CM

## 2021-10-05 DIAGNOSIS — B96.89 BACTERIAL VAGINOSIS: ICD-10-CM

## 2021-10-05 DIAGNOSIS — T83.32XA INTRAUTERINE CONTRACEPTIVE DEVICE THREADS LOST, INITIAL ENCOUNTER: Primary | ICD-10-CM

## 2021-10-05 LAB
CLUE CELLS: PRESENT
TRICHOMONAS, WET PREP: ABNORMAL
WBC'S/HIGH POWER FIELD, WET PREP: ABNORMAL
YEAST, WET PREP: ABNORMAL

## 2021-10-05 PROCEDURE — 99213 OFFICE O/P EST LOW 20 MIN: CPT | Performed by: ADVANCED PRACTICE MIDWIFE

## 2021-10-05 PROCEDURE — 87491 CHLMYD TRACH DNA AMP PROBE: CPT | Performed by: ADVANCED PRACTICE MIDWIFE

## 2021-10-05 PROCEDURE — 87210 SMEAR WET MOUNT SALINE/INK: CPT | Performed by: ADVANCED PRACTICE MIDWIFE

## 2021-10-05 PROCEDURE — 87591 N.GONORRHOEAE DNA AMP PROB: CPT | Performed by: ADVANCED PRACTICE MIDWIFE

## 2021-10-05 RX ORDER — METRONIDAZOLE 500 MG/1
500 TABLET ORAL 2 TIMES DAILY
Qty: 14 TABLET | Refills: 0 | Status: SHIPPED | OUTPATIENT
Start: 2021-10-05 | End: 2021-10-12

## 2021-10-05 ASSESSMENT — MIFFLIN-ST. JEOR: SCORE: 1540.61

## 2021-10-05 NOTE — PROGRESS NOTES
"S:  Hipolito is here because she cannot feel her IUD strings. She had IUD inserted 8/2021 and was able to feel strings immediately following insertion but cannot feel them now. In addition, she has noticed some increased vaginal discharge and wonders if she has BV. She would like gonorrhea/chlamydia testing today.    O:  /80 (BP Location: Left arm, Patient Position: Sitting, Cuff Size: Adult Regular)   Pulse 80   Ht 1.727 m (5' 8\")   Wt 73.2 kg (161 lb 6.4 oz)   BMI 24.54 kg/m    Pelvic Exam:  Vulva: No external lesions, normal hair distribution, no adenopathy  Vagina: Moist, pink, discharge is thin and grey, well rugated, no lesions; wet prep and GC/CT collected  Cervix: nulliparous, smooth, pink, IUD strings not visualized    Wet prep: clue cells present    A/P:  (T83.32XA) Intrauterine contraceptive device threads lost, initial encounter  (primary encounter diagnosis)  Comment: Discussed that next step is to have an US to visualize IUD position  Plan: US Transvaginal Non OB    (Z11.3) Routine screening for STI (sexually transmitted infection)  Plan: NEISSERIA GONORRHOEA PCR, CHLAMYDIA TRACHOMATIS        PCR, CANCELED: Neisseria gonorrhoeae PCR -         Clinic Collect, CANCELED: Chlamydia trachomatis        PCR    (N94.9) Vaginal burning  Plan: Wet prep - Clinic Collect    (N76.0,  B96.89) Bacterial vaginosis  Plan: metroNIDAZOLE (FLAGYL) 500 MG tablet      LEONARDO Grubbs CNM        "

## 2021-10-06 LAB
C TRACH DNA SPEC QL NAA+PROBE: NEGATIVE
N GONORRHOEA DNA SPEC QL NAA+PROBE: NEGATIVE

## 2021-10-18 ENCOUNTER — ANCILLARY PROCEDURE (OUTPATIENT)
Dept: ULTRASOUND IMAGING | Facility: CLINIC | Age: 21
End: 2021-10-18
Attending: ADVANCED PRACTICE MIDWIFE
Payer: COMMERCIAL

## 2021-10-18 DIAGNOSIS — T83.32XA INTRAUTERINE CONTRACEPTIVE DEVICE THREADS LOST, INITIAL ENCOUNTER: ICD-10-CM

## 2021-10-18 PROCEDURE — 76856 US EXAM PELVIC COMPLETE: CPT | Performed by: RADIOLOGY

## 2021-10-18 PROCEDURE — 76830 TRANSVAGINAL US NON-OB: CPT | Performed by: RADIOLOGY

## 2021-10-20 ENCOUNTER — OFFICE VISIT (OUTPATIENT)
Dept: MIDWIFE SERVICES | Facility: CLINIC | Age: 21
End: 2021-10-20
Attending: ADVANCED PRACTICE MIDWIFE
Payer: COMMERCIAL

## 2021-10-20 VITALS
SYSTOLIC BLOOD PRESSURE: 117 MMHG | DIASTOLIC BLOOD PRESSURE: 70 MMHG | BODY MASS INDEX: 24.33 KG/M2 | OXYGEN SATURATION: 99 % | WEIGHT: 160 LBS | HEART RATE: 82 BPM

## 2021-10-20 DIAGNOSIS — N83.202 HEMORRHAGIC CYST OF LEFT OVARY: Primary | ICD-10-CM

## 2021-10-20 DIAGNOSIS — Z97.5 IUD (INTRAUTERINE DEVICE) IN PLACE: ICD-10-CM

## 2021-10-20 PROCEDURE — 99213 OFFICE O/P EST LOW 20 MIN: CPT | Performed by: ADVANCED PRACTICE MIDWIFE

## 2021-10-20 NOTE — PROGRESS NOTES
S: Hipolito is here to review U/S for IUD position. She had the U/S placed by Dr. Collins on 8/26/21. Approx a month later, she could not feel the strings. She was scheduled for U/S on Mon of this week. Sat she started to note LLQ pain, and it became worse on Sunday. The pain has not continued to get more intense, is just constant aching. She took plan B twice, approx 2 weeks apart, because she was not certain that her IUD was in place and was worried about pregnancy.     O: /70   Pulse 82   Wt 72.6 kg (160 lb)   SpO2 99%   BMI 24.33 kg/m      U/S report 10/18/21:  IMPRESSION:  1.  IUD appears be in good position within the endometrium.  2.  A complex cystic lesion in the left ovary measures 5.2 cm, and has  an appearance suggestive of a hemorrhagic cyst. A follow-up ultrasound  in 6-12 weeks may be helpful to ensure resolution.    A/P:  (N83.202) Hemorrhagic cyst of left ovary  (primary encounter diagnosis)  Plan: POC US PELVIC NON-OB LIMITED  Recheck U/S in 6-12 weeks, as recommended by MD.    (Z97.5) IUD (intrauterine device) in place    Discussed tylenol/ibuprofen for pain.  Discussed presenting to emergency room for sudden increase in pain.    JANELLE Fisher CNM

## 2021-11-05 ENCOUNTER — THERAPY VISIT (OUTPATIENT)
Dept: PHYSICAL THERAPY | Facility: CLINIC | Age: 21
End: 2021-11-05
Payer: COMMERCIAL

## 2021-11-05 DIAGNOSIS — M25.562 CHRONIC PAIN OF LEFT KNEE: ICD-10-CM

## 2021-11-05 DIAGNOSIS — G89.29 CHRONIC PAIN OF RIGHT KNEE: ICD-10-CM

## 2021-11-05 DIAGNOSIS — G89.29 CHRONIC PAIN OF LEFT KNEE: ICD-10-CM

## 2021-11-05 DIAGNOSIS — M25.561 CHRONIC PAIN OF RIGHT KNEE: ICD-10-CM

## 2021-11-05 PROCEDURE — 97530 THERAPEUTIC ACTIVITIES: CPT | Mod: GP | Performed by: PHYSICAL THERAPIST

## 2021-11-05 PROCEDURE — 97110 THERAPEUTIC EXERCISES: CPT | Mod: GP | Performed by: PHYSICAL THERAPIST

## 2021-11-29 DIAGNOSIS — N83.202 HEMORRHAGIC CYST OF LEFT OVARY: Primary | ICD-10-CM

## 2021-12-02 ENCOUNTER — HOSPITAL ENCOUNTER (OUTPATIENT)
Dept: ULTRASOUND IMAGING | Facility: CLINIC | Age: 21
Discharge: HOME | End: 2021-12-02
Attending: ADVANCED PRACTICE MIDWIFE | Admitting: ADVANCED PRACTICE MIDWIFE
Payer: COMMERCIAL

## 2021-12-02 DIAGNOSIS — N83.202 HEMORRHAGIC CYST OF LEFT OVARY: ICD-10-CM

## 2021-12-02 PROCEDURE — 76830 TRANSVAGINAL US NON-OB: CPT | Mod: 26 | Performed by: RADIOLOGY

## 2021-12-02 PROCEDURE — 76856 US EXAM PELVIC COMPLETE: CPT | Mod: 26 | Performed by: RADIOLOGY

## 2021-12-02 PROCEDURE — 76830 TRANSVAGINAL US NON-OB: CPT

## 2021-12-13 ENCOUNTER — OFFICE VISIT (OUTPATIENT)
Dept: OBGYN | Facility: CLINIC | Age: 21
End: 2021-12-13
Payer: COMMERCIAL

## 2021-12-13 VITALS
DIASTOLIC BLOOD PRESSURE: 74 MMHG | HEART RATE: 77 BPM | BODY MASS INDEX: 24.37 KG/M2 | SYSTOLIC BLOOD PRESSURE: 122 MMHG | WEIGHT: 160.3 LBS | OXYGEN SATURATION: 100 %

## 2021-12-13 DIAGNOSIS — B96.89 BACTERIAL VAGINOSIS: ICD-10-CM

## 2021-12-13 DIAGNOSIS — Z30.431 IUD CHECK UP: ICD-10-CM

## 2021-12-13 DIAGNOSIS — N76.0 BACTERIAL VAGINOSIS: ICD-10-CM

## 2021-12-13 DIAGNOSIS — N83.202 HEMORRHAGIC CYST OF LEFT OVARY: Primary | ICD-10-CM

## 2021-12-13 LAB
CLUE CELLS: NORMAL
TRICHOMONAS, WET PREP: NORMAL
WBC'S/HIGH POWER FIELD, WET PREP: NORMAL
YEAST, WET PREP: NORMAL

## 2021-12-13 PROCEDURE — 99213 OFFICE O/P EST LOW 20 MIN: CPT | Performed by: OBSTETRICS & GYNECOLOGY

## 2021-12-13 PROCEDURE — 87210 SMEAR WET MOUNT SALINE/INK: CPT | Performed by: OBSTETRICS & GYNECOLOGY

## 2021-12-13 NOTE — PROGRESS NOTES
"GYN Progress Note     CC: F/U for hemorraghic ovarian cyst     HPI: Arlen William is a 22 YO  who presents for follow up for a hemorraghic left ovarian cyst. She reporting having significant left sided lower abdominal and pelvic pain towards the end of October and an ultrasound (which had actually been ordered for assess the location of her IUD) showed a 5 cm left hemorraghic cyst. Her IUD was confirmed to be in the proper location. She reports that over the past month and a half her abdominal pain has improved and she is starting to feel like herself again. Denies any pelvic/abdominal pain today. She is concerned about the risk of this type of cyst recurring again in the future. She also has issues with recurrent bacterial vaginosis and reports being treated 5 times in the last year for BV. She feels like she might have a BV infection starting today and is frustrated that the infection keeps coming back. She is wondering if there's anything that can be done to help prevent that from happening.     O: Vital signs:      BP: 122/74 Pulse: 77     SpO2: 100 %       Weight: 72.7 kg (160 lb 4.8 oz)  Estimated body mass index is 24.37 kg/m  as calculated from the following:    Height as of 10/5/21: 1.727 m (5' 8\").    Weight as of this encounter: 72.7 kg (160 lb 4.8 oz).    General: Patient alert and oriented, no acute distress  CV: no peripheral edema or cyanosis  Resp: normal respiratory effort and equal lung expansion  Abdomen: non-tender to light and deep palpation, no masses, organomegaly or hernia  : normal external genitalia without lesions. Urethral meatus normal, urethra and bladder non-tender to palpation. Vaginal mucosa normal in appearance without lesions, scant physiologic discharge. Cervix appears grossly normal, IUD strings not visualized and unable to sweep them down with a cytobrush.  Uterus normal size and contour, non-tender. No adnexal fullness or masses present.  Ext: non-tender, no " edema    EXAMINATION: US PELVIC TRANSABDOMINAL AND TRANSVAGINAL, 2021 7:52  AM      COMPARISON: 10/18/2021     HISTORY: Hemorrhagic cyst of the left ovary, IUD placed in August     TECHNIQUE: The pelvis was scanned in standard fashion with  transabdominal and transvaginal transducer(s) using both grey scale  and color Doppler techniques.     FINDINGS  The uterus measures 6.4 x 4.4 x 2.9 cm, and there is no evidence of a  focal fibroid.  The endometrium is within normal limits and measures 4  mm. Intrauterine device appropriately positioned within the  endometrial canal. There is no free fluid in the pelvis.     The right ovary measures 3.4 x 3.4 x 1.5 cm and the left ovary  measures 0.5 x 1.7 x 4.2 cm. Complex left adnexal cyst measuring 3.0 x  1.7 x 1.8 cm demonstrating heterogeneous, hypoechoic contents without  internal vascularity, previously measuring up to 5.2 cm. There is  normal blood flow to the ovaries.     Urinary bladder is unremarkable                                                                      IMPRESSION:   1.  Resolving left ovarian hemorrhagic cyst.  2.  Appropriately positioned IUD.     I have personally reviewed the examination and initial interpretation  and I agree with the findings.     GABRIELA FRAGOSO MD      Assessment and plan:   Arlen William is a 20 YO  who presents for follow up for a left hemorraghic ovarian cyst, also with concerns about her IUD and recurrent BV    (N83.202) Hemorrhagic cyst of left ovary  (primary encounter diagnosis)  -Pain has mostly resolved, cyst decreased in size on most recent pelvic US on 2021 compared to her initial scan on 10/18/2021   -Discussed physiology of hemorraghic ovarian cysts and that this will likely fully resolve on its own. It is possible for these cysts to recur and if she has issues with recurrent cysts, we could consider switching to contraception that consistently inhibits ovulation to help prevent formation of new  ovarian cysts. She would like to continue monitoring for now.     (N76.0,  B96.89) Bacterial vaginosis   Comment: Patient has a history of chronic/recurrent BV and symptoms of an infection today and has been treated at least 5 times in the past year   Plan: Wet prep - Clinic Collect, if positive for BV I would recommend treatment of the acute infection with Metrogel every day for 5 days followed by once weekly for 6 month for treatment of recurrent BV. I also recommended that she consider a probiotic although the data on this is limited.     (Z30.718) IUD check up  -IUD strings not found on exam but IUD appropriately positioned on ultrasound   -Discussed that the IUD is still typically able to removed in the office even if we can't see the strings but that sometimes a hysteroscopy is required.     Dispo: RTC as needed     Jacqueline Herndon MD

## 2022-01-22 ENCOUNTER — HEALTH MAINTENANCE LETTER (OUTPATIENT)
Age: 22
End: 2022-01-22

## 2022-02-28 ENCOUNTER — E-VISIT (OUTPATIENT)
Dept: MIDWIFE SERVICES | Facility: CLINIC | Age: 22
End: 2022-02-28
Payer: COMMERCIAL

## 2022-02-28 DIAGNOSIS — N89.8 VAGINAL DISCHARGE: Primary | ICD-10-CM

## 2022-02-28 PROCEDURE — 99207 PR NO BILLABLE SERVICE THIS VISIT: CPT | Performed by: OBSTETRICS & GYNECOLOGY

## 2022-02-28 NOTE — TELEPHONE ENCOUNTER
Provider E-Visit time total (minutes): <10 min.     Patient has history of recurrent BV but most recent wet prep was negative. Would recommend a repeat wet prep and if positive treat with a prolonged course of metronidazole (PO Metronidazole 500 mg BID for 7 days followed by Metrogel weekly x 6 months).     Jacqueline Herndon MD

## 2022-03-01 NOTE — PATIENT INSTRUCTIONS
Thank you for choosing us for your care. Given your symptoms, I would like you to do a lab-only visit to determine what is causing them.  I have placed the orders.  Please schedule an appointment [place], or call [phone].  I will let you know when the results are back and next steps to take.

## 2022-03-02 ENCOUNTER — LAB (OUTPATIENT)
Dept: LAB | Facility: CLINIC | Age: 22
End: 2022-03-02
Payer: COMMERCIAL

## 2022-03-02 DIAGNOSIS — N89.8 VAGINAL DISCHARGE: ICD-10-CM

## 2022-03-02 LAB
CLUE CELLS: ABNORMAL
TRICHOMONAS, WET PREP: ABNORMAL
WBC'S/HIGH POWER FIELD, WET PREP: ABNORMAL
YEAST, WET PREP: ABNORMAL

## 2022-03-02 PROCEDURE — 87210 SMEAR WET MOUNT SALINE/INK: CPT

## 2022-03-08 ENCOUNTER — OFFICE VISIT (OUTPATIENT)
Dept: OBGYN | Facility: CLINIC | Age: 22
End: 2022-03-08
Payer: COMMERCIAL

## 2022-03-08 VITALS
SYSTOLIC BLOOD PRESSURE: 123 MMHG | OXYGEN SATURATION: 98 % | WEIGHT: 166.2 LBS | DIASTOLIC BLOOD PRESSURE: 71 MMHG | HEART RATE: 62 BPM | BODY MASS INDEX: 25.27 KG/M2 | TEMPERATURE: 97 F

## 2022-03-08 DIAGNOSIS — N73.0 PID (ACUTE PELVIC INFLAMMATORY DISEASE): Primary | ICD-10-CM

## 2022-03-08 DIAGNOSIS — R35.0 URINARY FREQUENCY: ICD-10-CM

## 2022-03-08 DIAGNOSIS — A59.9 INFECTION DUE TO TRICHOMONAS: ICD-10-CM

## 2022-03-08 LAB
ALBUMIN UR-MCNC: NEGATIVE MG/DL
APPEARANCE UR: CLEAR
BILIRUB UR QL STRIP: NEGATIVE
CLUE CELLS: ABNORMAL
COLOR UR AUTO: YELLOW
CRP SERPL-MCNC: <2.9 MG/L (ref 0–8)
ERYTHROCYTE [DISTWIDTH] IN BLOOD BY AUTOMATED COUNT: 13 % (ref 10–15)
GLUCOSE UR STRIP-MCNC: NEGATIVE MG/DL
HCT VFR BLD AUTO: 40 % (ref 35–47)
HGB BLD-MCNC: 13.1 G/DL (ref 11.7–15.7)
HGB UR QL STRIP: NEGATIVE
KETONES UR STRIP-MCNC: NEGATIVE MG/DL
LEUKOCYTE ESTERASE UR QL STRIP: ABNORMAL
MCH RBC QN AUTO: 29.5 PG (ref 26.5–33)
MCHC RBC AUTO-ENTMCNC: 32.8 G/DL (ref 31.5–36.5)
MCV RBC AUTO: 90 FL (ref 78–100)
NITRATE UR QL: NEGATIVE
PH UR STRIP: 6.5 [PH] (ref 5–7)
PLATELET # BLD AUTO: 303 10E3/UL (ref 150–450)
RBC # BLD AUTO: 4.44 10E6/UL (ref 3.8–5.2)
SP GR UR STRIP: 1.01 (ref 1–1.03)
TRICHOMONAS, WET PREP: PRESENT
UROBILINOGEN UR STRIP-ACNC: 0.2 E.U./DL
WBC # BLD AUTO: 9.7 10E3/UL (ref 4–11)
WBC'S/HIGH POWER FIELD, WET PREP: ABNORMAL
YEAST, WET PREP: ABNORMAL

## 2022-03-08 PROCEDURE — 85027 COMPLETE CBC AUTOMATED: CPT | Performed by: OBSTETRICS & GYNECOLOGY

## 2022-03-08 PROCEDURE — 87491 CHLMYD TRACH DNA AMP PROBE: CPT | Performed by: OBSTETRICS & GYNECOLOGY

## 2022-03-08 PROCEDURE — 99214 OFFICE O/P EST MOD 30 MIN: CPT | Performed by: OBSTETRICS & GYNECOLOGY

## 2022-03-08 PROCEDURE — 81003 URINALYSIS AUTO W/O SCOPE: CPT | Performed by: OBSTETRICS & GYNECOLOGY

## 2022-03-08 PROCEDURE — 36415 COLL VENOUS BLD VENIPUNCTURE: CPT | Performed by: OBSTETRICS & GYNECOLOGY

## 2022-03-08 PROCEDURE — 86140 C-REACTIVE PROTEIN: CPT | Performed by: OBSTETRICS & GYNECOLOGY

## 2022-03-08 PROCEDURE — 87591 N.GONORRHOEAE DNA AMP PROB: CPT | Performed by: OBSTETRICS & GYNECOLOGY

## 2022-03-08 PROCEDURE — 87210 SMEAR WET MOUNT SALINE/INK: CPT | Performed by: OBSTETRICS & GYNECOLOGY

## 2022-03-08 RX ORDER — DOXYCYCLINE 100 MG/1
100 TABLET ORAL 2 TIMES DAILY
Qty: 14 TABLET | Refills: 0 | Status: SHIPPED | OUTPATIENT
Start: 2022-03-08 | End: 2022-10-10

## 2022-03-08 RX ORDER — METRONIDAZOLE 500 MG/1
500 TABLET ORAL 2 TIMES DAILY
Qty: 28 TABLET | Refills: 0 | Status: SHIPPED | OUTPATIENT
Start: 2022-03-08 | End: 2022-03-22

## 2022-03-08 NOTE — PROGRESS NOTES
GYN Progress Note     CC: Persistent vaginal discharge, lower abdominal pain     HPI: Arlen William is a 20 YO G0 who presents for follow up due to vaginal discharge and lower abdominal pain. She reports that she was seen at an outside clinic about a month ago and was tested for STIs and positive for chlamydia which was treated with single dose Azithromycin. Shortly after taking the antibiotics, she noticed continued vaginal discharge, a vaginal odor and some lower abdominal pain and cramping. The pain typically starts at her umbilicus and radiates down to her RLQ. She does endorse some urinary frequency but no dysuria. She denies nausea, vomiting, or fevers. She does report having a test of cure that was negative last week (but does not have records).     O: /71   Pulse 62   Temp 97  F (36.1  C)   Wt 75.4 kg (166 lb 3.2 oz)   LMP  (LMP Unknown)   SpO2 98%   Breastfeeding No   BMI 25.27 kg/m      General: Patient alert and oriented, no acute distress  CV: no peripheral edema or cyanosis  Resp: normal respiratory effort and equal lung expansion  Abdomen: tender to palpation over the umbilicus, minimal tenderness in the RLQ. No guarding or rebound.   : normal external genitalia without lesions. Urethral meatus normal, urethra and bladder non-tender to palpation. Vaginal mucosa normal in appearance without lesions, scant physiologic discharge. Cervix appears erythematous.  Uterus normal size and contour, moderate CMT present. No adnexal fullness or masses present.  Ext: non-tender, no edema    Component      Latest Ref Rng & Units 3/8/2022   Color Urine      Colorless, Straw, Light Yellow, Yellow Yellow   Appearance Urine      Clear Clear   Glucose Urine      Negative mg/dL Negative   Bilirubin Urine      Negative Negative   Ketones Urine      Negative mg/dL Negative   Specific Gravity Urine      1.003 - 1.035 1.010   Blood Urine      Negative Negative   pH Urine      5.0 - 7.0 6.5   Protein Albumin  Urine      Negative mg/dL Negative   Urobilinogen Urine      0.2, 1.0 E.U./dL 0.2   Nitrite Urine      Negative Negative   Leukocyte Esterase Urine      Negative Small (A)   WBC      4.0 - 11.0 10e3/uL 9.7   RBC Count      3.80 - 5.20 10e6/uL 4.44   Hemoglobin      11.7 - 15.7 g/dL 13.1   Hematocrit      35.0 - 47.0 % 40.0   MCV      78 - 100 fL 90   MCH      26.5 - 33.0 pg 29.5   MCHC      31.5 - 36.5 g/dL 32.8   RDW      10.0 - 15.0 % 13.0   Platelet Count      150 - 450 10e3/uL 303   Trichomonas      Absent Present (A)   Yeast      Absent Absent   Clue cells      Absent Absent   WBCs/high power field      None 1+ (A)     Assessment and Plan:   Arlen William is a 22 YO  who presents with pelvic inflammatory disease     (N73.0) PID (acute pelvic inflammatory disease)  (primary encounter diagnosis)  -Based on history of recent chlamydia infection, pelvic pain and CMT   -Will return to clinic tomorrow for IM ceftriaxone, Rx sent for Doxycycline 100 mg BID and Metronidazole 500 mg BID x 14 days   -Reviewed worsening signs of infection including fevers, worsening abdominal/pelvic pain and nausea/vomiting and that any of these signs would warrant further evaluation   -Patient has IUD in situ, we discussed that a diagnosis of PID does not automatically warrant IUD removal as long as she is improving with treatment  -Repeat testing for GC/CT also obtained, orders placed for HIV/RPR/Hep B and Hep C   -Other etiologies of pelvic pain and RLQ abdominal pain like appendicitis less likely but would consider getting abdominal imaging if her pain worsens despite above treatment.     (R35.0) Urinary frequency  Plan: Urinalysis Macroscopic    (A59.9) Infection due to trichomonas  -positive test results reviewed with patient, will be treated with above antibiotic regimen     Dispo: RTC tomorrow for IM Ceftriaxone and on 3/17 for follow up or sooner JUAN Herndon MD

## 2022-03-09 ENCOUNTER — LAB (OUTPATIENT)
Dept: LAB | Facility: CLINIC | Age: 22
End: 2022-03-09

## 2022-03-09 ENCOUNTER — ALLIED HEALTH/NURSE VISIT (OUTPATIENT)
Dept: NURSING | Facility: CLINIC | Age: 22
End: 2022-03-09
Payer: COMMERCIAL

## 2022-03-09 VITALS
DIASTOLIC BLOOD PRESSURE: 69 MMHG | HEART RATE: 60 BPM | TEMPERATURE: 97 F | WEIGHT: 166 LBS | SYSTOLIC BLOOD PRESSURE: 112 MMHG | OXYGEN SATURATION: 98 % | BODY MASS INDEX: 25.24 KG/M2

## 2022-03-09 DIAGNOSIS — N73.0 PID (ACUTE PELVIC INFLAMMATORY DISEASE): ICD-10-CM

## 2022-03-09 DIAGNOSIS — A59.01 TRICHOMONAS VAGINITIS: Primary | ICD-10-CM

## 2022-03-09 PROCEDURE — 87340 HEPATITIS B SURFACE AG IA: CPT

## 2022-03-09 PROCEDURE — 36415 COLL VENOUS BLD VENIPUNCTURE: CPT

## 2022-03-09 PROCEDURE — 96372 THER/PROPH/DIAG INJ SC/IM: CPT | Performed by: OBSTETRICS & GYNECOLOGY

## 2022-03-09 PROCEDURE — 86803 HEPATITIS C AB TEST: CPT

## 2022-03-09 PROCEDURE — 86780 TREPONEMA PALLIDUM: CPT

## 2022-03-09 PROCEDURE — 87389 HIV-1 AG W/HIV-1&-2 AB AG IA: CPT

## 2022-03-09 NOTE — PROGRESS NOTES
Clinic Administered Medication Documentation    Administrations This Visit     cefTRIAXone (ROCEPHIN) injection 500 mg     Admin Date  03/09/2022 Action  Given Dose  500 mg Route  Intramuscular Site   Administered By  Criss Graves MA    Ordering Provider: Jacqueline Herndon MD    NDC: 6472-7368-51    Lot#: [id number]    : HOSPIRA    Patient Supplied?: No    Comments: Pt  was scheduled for today to receive injection                  Injectable Medication Documentation    Patient was given Ceftriaxone Sodium (Rocephin). Prior to medication administration, verified patients identity using patient s name and date of birth. Please see MAR and medication order for additional information. Patient instructed to remain in clinic for 15 minutes.    Lidocaine: 1 mL  NDC:  [address]-73  LOT:  [id number]  EXP:  09/01/2023  Vial/Syringe: Multi Vial     Was entire vial of medication used? Yes  Vial/Syringe: Single dose vial  Expiration Date:  08/01/2024  Was this medication supplied by the patient? No

## 2022-03-10 LAB
HBV SURFACE AG SERPL QL IA: NONREACTIVE
HCV AB SERPL QL IA: NONREACTIVE
HIV 1+2 AB+HIV1 P24 AG SERPL QL IA: NONREACTIVE
T PALLIDUM AB SER QL: NONREACTIVE

## 2022-03-11 LAB
C TRACH DNA SPEC QL NAA+PROBE: NEGATIVE
N GONORRHOEA DNA SPEC QL NAA+PROBE: NEGATIVE

## 2022-03-17 ENCOUNTER — OFFICE VISIT (OUTPATIENT)
Dept: OBGYN | Facility: CLINIC | Age: 22
End: 2022-03-17
Payer: COMMERCIAL

## 2022-03-17 VITALS
BODY MASS INDEX: 24.98 KG/M2 | HEART RATE: 67 BPM | WEIGHT: 164.3 LBS | TEMPERATURE: 98 F | DIASTOLIC BLOOD PRESSURE: 72 MMHG | SYSTOLIC BLOOD PRESSURE: 131 MMHG

## 2022-03-17 DIAGNOSIS — N73.0 PID (ACUTE PELVIC INFLAMMATORY DISEASE): Primary | ICD-10-CM

## 2022-03-17 PROCEDURE — 99213 OFFICE O/P EST LOW 20 MIN: CPT | Performed by: OBSTETRICS & GYNECOLOGY

## 2022-03-18 NOTE — PROGRESS NOTES
GYN Progress Note     CC: F/U PID    HPI: Arlen William is a 20 YO  who presents for follow up for pelvic inflammatory disease following chlamydia and trichomonas infection. She was treated with IM Ceftriaxone on 3/9 and started on PO Flagyl and Doxycycline for 14 days. She is tolerating the PO antibiotics as well, just having mild nausea. She denies fevers or chills. She is having some hip and lower back pain but denies pelvic pain/lower abdominal besides mild cramping. She is concerned about the sequela of PID on her long term fertility and also still concerned about having a Mirena IUD in place while undergoing treatment for PID.     O:   /72   Pulse 67   Temp 98  F (36.7  C)   Wt 74.5 kg (164 lb 4.8 oz)   LMP  (LMP Unknown)   BMI 24.98 kg/m    General: Patient alert and oriented, no acute distress  CV: no peripheral edema or cyanosis  Resp: normal respiratory effort and equal lung expansion  Abdomen: non-tender to light and deep palpation, no masses, organomegaly or hernia  : normal external genitalia without lesions.  Uterus normal size and contour, non-tender. No adnexal fullness or masses present, CMT resolved.   Ext: non-tender, no edema    Assessment and plan:   Arlen William is a 20 YO  who presents for follow up for PID  -We discussed that based on her physical exam the PID is resolving, she currently has minimal tenderness on exam and remains afebrile. I recommended continuing the full 14 day course of PO antibiotics. I also recommended repeat testing for gonorrhea and chlamydia in 3 months.   -We discussed the guidelines for treatment of PID with a IUD in situ and because her infection is resolving, I would not recommend removing the IUD at this time from an infection standpoint however I am happy to remove the IUD at anytime if she desires it to be removed. She is comfortable with leaving the IUD in place at this time.     Dispo: RTC in 2 weeks or sooner PRN    Jacqueline Herndon MD

## 2022-04-08 ENCOUNTER — OFFICE VISIT (OUTPATIENT)
Dept: URGENT CARE | Facility: URGENT CARE | Age: 22
End: 2022-04-08
Payer: COMMERCIAL

## 2022-04-08 ENCOUNTER — ANCILLARY PROCEDURE (OUTPATIENT)
Dept: GENERAL RADIOLOGY | Facility: CLINIC | Age: 22
End: 2022-04-08
Attending: NURSE PRACTITIONER
Payer: COMMERCIAL

## 2022-04-08 VITALS
OXYGEN SATURATION: 99 % | HEART RATE: 77 BPM | SYSTOLIC BLOOD PRESSURE: 116 MMHG | TEMPERATURE: 99.1 F | DIASTOLIC BLOOD PRESSURE: 66 MMHG

## 2022-04-08 DIAGNOSIS — M25.571 PAIN IN JOINT INVOLVING ANKLE AND FOOT, RIGHT: Primary | ICD-10-CM

## 2022-04-08 DIAGNOSIS — M25.571 PAIN IN JOINT INVOLVING ANKLE AND FOOT, RIGHT: ICD-10-CM

## 2022-04-08 PROCEDURE — 99214 OFFICE O/P EST MOD 30 MIN: CPT | Performed by: NURSE PRACTITIONER

## 2022-04-08 PROCEDURE — 73610 X-RAY EXAM OF ANKLE: CPT | Mod: RT | Performed by: RADIOLOGY

## 2022-04-08 NOTE — PROGRESS NOTES
Assessment & Plan     Pain in joint involving ankle and foot, right  Xray interpreted as negative in the clinic.  Pending radiologist review.    Possible fracture noted by radiology.    Recommend f/u with ortho if pains persist.    RICE.  Aleve BID PRN  Continue using crutch for a few days.    Elevate this weekend.    F/u if persists or worsens.    - XR Ankle Right G/E 3 Views         Return in about 2 days (around 4/10/2022) for with regular provider if symptoms persist.    LEONARDO Sampson [facility] RYLEE Mcmillan is a 21 year old female who presents to clinic today for the following health issues:  Chief Complaint   Patient presents with     Urgent Care     Trauma     right ankle injury- 1.5 days ago-  while playing basketbaqll at the gym     HPI    MS Injury/Pain    Onset of symptoms was 2 day(s) ago.  Location: right ankle  Context:       The injury happened while playing      Mechanism: fall , sports related injury      Patient experienced immediate pain, immediate swelling, was able to bear weight directly after injury, no deformity was noted by the patient  Course of symptoms is improving.    Severity moderate  Current and Associated symptoms: Pain, Swelling and Bruising  Denies  Warmth, Redness and Stiffness  Aggravating Factors: walking and weight-bearing  Therapies to improve symptoms include: ice, ibuprofen and elevation  This is the first time this type of problem has occurred for this patient to right ankle.  .   Pain slowlu improving  Has been using crutches and this helps take the load off the ankle joint.      Review of Systems  Constitutional, HEENT, cardiovascular, pulmonary, GI, , musculoskeletal, neuro, skin, endocrine and psych systems are negative, except as otherwise noted.      Objective    /66   Pulse 77   Temp 99.1  F (37.3  C) (Tympanic)   SpO2 99%   Breastfeeding No   Physical Exam   GENERAL: healthy, alert and no distress  RESP:  lungs clear to auscultation - no rales, rhonchi or wheezes  CV: regular rate and rhythm, normal S1 S2, no S3 or S4, no murmur, click or rub, no peripheral edema and peripheral pulses strong  MS: tenderness to palpation right lateral ankle and RLE exam shows normal strength and muscle mass and joint mobility decreased to flexion and extension  SKIN: diffuse edema and ecchymosis to medial and lateral ankle joint.    NEURO: Normal strength and tone, mentation intact and speech normal    Results for orders placed or performed in visit on 04/08/22   XR Ankle Right G/E 3 Views     Status: None    Narrative    ANKLE RIGHT THREE OR MORE VIEWS    4/8/2022 4:23 PM     HISTORY: Pain in joint involving ankle and foot, right    COMPARISON: None.      Impression    IMPRESSION: Soft tissue swelling around the ankle. Small ossicle  adjacent to the tip of the medial malleolus could be a small avulsion  fracture. No other fracture identified. Talar dome and mortise are  intact.    LO ACKERMAN MD         SYSTEM ID:  [id number]

## 2022-04-19 ENCOUNTER — MYC MEDICAL ADVICE (OUTPATIENT)
Dept: OBGYN | Facility: CLINIC | Age: 22
End: 2022-04-19
Payer: COMMERCIAL

## 2022-04-19 DIAGNOSIS — B37.31 YEAST INFECTION OF THE VAGINA: Primary | ICD-10-CM

## 2022-04-22 RX ORDER — FLUCONAZOLE 150 MG/1
150 TABLET ORAL ONCE
Qty: 1 TABLET | Refills: 0 | Status: SHIPPED | OUTPATIENT
Start: 2022-04-22 | End: 2022-04-22

## 2022-05-05 ENCOUNTER — E-VISIT (OUTPATIENT)
Dept: URGENT CARE | Facility: URGENT CARE | Age: 22
End: 2022-05-05
Payer: COMMERCIAL

## 2022-05-05 DIAGNOSIS — J01.90 ACUTE SINUSITIS WITH SYMPTOMS > 10 DAYS: Primary | ICD-10-CM

## 2022-05-05 DIAGNOSIS — N76.0 BV (BACTERIAL VAGINOSIS): ICD-10-CM

## 2022-05-05 DIAGNOSIS — B96.89 BV (BACTERIAL VAGINOSIS): ICD-10-CM

## 2022-05-05 PROCEDURE — 99421 OL DIG E/M SVC 5-10 MIN: CPT | Performed by: PHYSICIAN ASSISTANT

## 2022-05-05 RX ORDER — METRONIDAZOLE 500 MG/1
500 TABLET ORAL 2 TIMES DAILY
Qty: 14 TABLET | Refills: 0 | Status: SHIPPED | OUTPATIENT
Start: 2022-05-05 | End: 2022-05-12

## 2022-05-06 NOTE — PATIENT INSTRUCTIONS
Sinusitis (Antibiotic Treatment)    The sinuses are air-filled spaces within the bones of the face. They connect to the inside of the nose. Sinusitis is an inflammation of the tissue that lines the sinuses. Sinusitis can occur during a cold. It can also happen due to allergies to pollens and other particles in the air. Sinusitis can cause symptoms of sinus congestion and a feeling of fullness. A sinus infection causes fever, headache, and facial pain. There is often green or yellow fluid draining from the nose or into the back of the throat (post-nasal drip). You have been given antibiotics to treat this condition.   Home care  Take the full course of antibiotics as instructed. Don't stop taking them, even when you feel better.  Drink plenty of water, hot tea, and other liquids as directed by the healthcare provider. This may help thin nasal mucus. It also may help your sinuses drain fluids.  Heat may help soothe painful areas of your face. Use a towel soaked in hot water. Or,  the shower and direct the warm spray onto your face. Using a vaporizer along with a menthol rub at night may also help soothe symptoms.   An expectorant with guaifenesin may help thin nasal mucus and help your sinuses drain fluids. Talk with your provider or pharmacists before taking an over-the-counter (OTC) medicine if you have any questions about it or its side effects..  You can use an OTC decongestant, unless a similar medicine was prescribed to you. Nasal sprays work the fastest. Use one that contains phenylephrine or oxymetazoline. First blow your nose gently. Then use the spray. Don't use these medicines more often than directed on the label. If you do, your symptoms may get worse. You may also take pills that contain pseudoephedrine. Don t use products that combine multiple medicines. This is because side effects may be increased. Read labels. You can also ask the pharmacist for help. (People with high blood pressure  should not use decongestants. They can raise blood pressure.) Talk with your provider or pharmacist if you have any questions about the medicine..  OTC antihistamines may help if allergies contributed to your sinusitis. Talk with your provider or pharmacist if you have any questions about the medicine..  Don't use nasal rinses or irrigation during an acute sinus infection, unless your healthcare provider tells you to. Rinsing may spread the infection to other areas in your sinuses.  Use acetaminophen or ibuprofen to control pain, unless another pain medicine was prescribed to you. If you have chronic liver or kidney disease or ever had a stomach ulcer, talk with your healthcare provider before using these medicines. Never give aspirin to anyone under age 18 who is ill with a fever. It may cause severe liver damage.  Don't smoke. This can make symptoms worse.    Follow-up care  Follow up with your healthcare provider, or as advised.   When to seek medical advice  Call your healthcare provider if any of these occur:   Facial pain or headache that gets worse  Stiff neck  Unusual drowsiness or confusion  Swelling of your forehead or eyelids  Symptoms don't go away in 10 days  Vision problems, such as blurred or double vision  Fever of 100.4 F (38 C) or higher, or as directed by your healthcare provider  Call 911  Call 911 if any of these occur:   Seizure  Trouble breathing  Feeling dizzy or faint  Fingernails, skin or lips look blue, purple , or gray  Prevention  Here are steps you can take to help prevent an infection:   Keep good hand washing habits.  Don t have close contact with people who have sore throats, colds, or other upper respiratory infections.  Don t smoke, and stay away from secondhand smoke.  Stay up to date with of your vaccines.  [employer] last reviewed this educational content on 12/1/2019 2000-2021 [employer]. All rights reserved. This information is not intended as a substitute for  professional medical care. Always follow your healthcare professional's instructions.        Dear Arlen William    After reviewing your responses, I've been able to diagnose you with?a sinus infection caused by bacteria.?     Based on your responses and diagnosis, I have prescribed augmentin to treat your symptoms. I also ordered flagyl for you and  I have sent this to your pharmacy.?     It is also important to stay well hydrated, get lots of rest and take over-the-counter decongestants,?tylenol?or ibuprofen if you?are able to?take those medications per your primary care provider to help relieve discomfort.?     It is important that you take?all of?your prescribed medication even if your symptoms are improving after a few doses.? Taking?all of?your medicine helps prevent the symptoms from returning.?     If your symptoms worsen, you develop severe headache, vomiting, high fever (>102), or are not improving in 7 days, please contact your primary care provider for an appointment or visit any of our convenient Walk-in Care or Urgent Care Centers to be seen which can be found on our website?here.?     Thanks again for choosing?us?as your health care partner,?   ?  Ritesh Burger, [facility], PASteffanyC?

## 2022-05-11 ENCOUNTER — TELEPHONE (OUTPATIENT)
Dept: URGENT CARE | Facility: URGENT CARE | Age: 22
End: 2022-05-11
Payer: COMMERCIAL

## 2022-05-11 DIAGNOSIS — J01.90 ACUTE SINUSITIS WITH SYMPTOMS > 10 DAYS: Primary | ICD-10-CM

## 2022-05-11 NOTE — TELEPHONE ENCOUNTER
Reason for Call:  Other prescription    Detailed comments: E-visit medication prescribed is not working looking to see if there is something else that can be prescribed      Phone Number Patient can be reached at: Home number on file [phone] (home)    Best Time: anytime    Can we leave a detailed message on this number? YES    Call taken on 5/11/2022 at 2:32 PM by Sariah Kilgore

## 2022-05-12 RX ORDER — DOXYCYCLINE 100 MG/1
100 CAPSULE ORAL 2 TIMES DAILY
Qty: 20 CAPSULE | Refills: 0 | Status: SHIPPED | OUTPATIENT
Start: 2022-05-12 | End: 2022-10-10

## 2022-05-12 NOTE — TELEPHONE ENCOUNTER
Patient was sent a new script.  Doxycycline sent to [facility]    Thank you  TOBY Covarrubias PA-C

## 2022-05-13 ENCOUNTER — OFFICE VISIT (OUTPATIENT)
Dept: OBGYN | Facility: CLINIC | Age: 22
End: 2022-05-13
Payer: COMMERCIAL

## 2022-05-13 VITALS
WEIGHT: 162 LBS | OXYGEN SATURATION: 100 % | DIASTOLIC BLOOD PRESSURE: 78 MMHG | HEART RATE: 99 BPM | SYSTOLIC BLOOD PRESSURE: 119 MMHG | BODY MASS INDEX: 24.63 KG/M2

## 2022-05-13 DIAGNOSIS — N89.8 VAGINAL DISCHARGE: Primary | ICD-10-CM

## 2022-05-13 DIAGNOSIS — B37.31 CANDIDIASIS OF VAGINA: ICD-10-CM

## 2022-05-13 DIAGNOSIS — J30.2 SEASONAL ALLERGIC RHINITIS, UNSPECIFIED TRIGGER: ICD-10-CM

## 2022-05-13 DIAGNOSIS — J45.20 MILD INTERMITTENT ASTHMA WITHOUT COMPLICATION: ICD-10-CM

## 2022-05-13 LAB
CLUE CELLS: ABNORMAL
TRICHOMONAS, WET PREP: ABNORMAL
WBC'S/HIGH POWER FIELD, WET PREP: ABNORMAL
YEAST, WET PREP: PRESENT

## 2022-05-13 PROCEDURE — 87491 CHLMYD TRACH DNA AMP PROBE: CPT | Performed by: OBSTETRICS & GYNECOLOGY

## 2022-05-13 PROCEDURE — 87591 N.GONORRHOEAE DNA AMP PROB: CPT | Performed by: OBSTETRICS & GYNECOLOGY

## 2022-05-13 PROCEDURE — 87210 SMEAR WET MOUNT SALINE/INK: CPT | Performed by: OBSTETRICS & GYNECOLOGY

## 2022-05-13 PROCEDURE — 99214 OFFICE O/P EST MOD 30 MIN: CPT | Performed by: OBSTETRICS & GYNECOLOGY

## 2022-05-13 RX ORDER — ALBUTEROL SULFATE 90 UG/1
2 AEROSOL, METERED RESPIRATORY (INHALATION) EVERY 6 HOURS PRN
Qty: 18 G | Refills: 0 | Status: SHIPPED | OUTPATIENT
Start: 2022-05-13 | End: 2022-12-21

## 2022-05-13 RX ORDER — FLUCONAZOLE 150 MG/1
150 TABLET ORAL ONCE
Qty: 1 TABLET | Refills: 0 | Status: SHIPPED | OUTPATIENT
Start: 2022-05-13 | End: 2022-05-13

## 2022-05-13 NOTE — PROGRESS NOTES
GYN Problem Visit                                                 CC:  Vaginal discharge, current BV    HPI:  Arlen William is a 21 year old female who presents to clinic today complaining of vaginal discharge, recurrent BV.    Biggest concern today is discharge that's creamy.  Sometimes a bit itchy.  Not sure if that's related to shaving.  Overall, she's feeling the best she's felt in a while, but still has problems randomly.  Gets random cramps in stomach area, but also in vaginal wall area.  That's always been a random thing that's never been well explained, but has been associated with BV before.      Just finished antibiotics for sinusitis recently and feeling some mild irritation/itching.  Also wondering if she can get a refill for her inhaler.  Reached out to UC provider who prescribed antibiotics, but reports not having heard back.  Based on chart review, it looks like a course of doxy was sent to her pharmacy, but she's hesitant to take another course of antibiotics.  Doesn't feel like that's absolutely necessary right now.  Shares that she also feels like seasonal allergies may play a role in her symptoms.    No foul/fishy odor, but unsure if the odor she has is normal.  Too hard to tell since it's been a while since it's been normal.    No new partners since March.    Neg wet prep 12/2021.  Pos CT in Jan 2022 and was treated.  Neg wet prep, GC/CT in 3/2/2022  Wet prep pos for trich 3/8/22      ROS:  Neg except as noted in HPI    EXAM:  Blood pressure 119/78, pulse 99, weight 73.5 kg (162 lb), SpO2 100 %, not currently breastfeeding.   BMI= Body mass index is 24.63 kg/m .  General - pleasant female in no acute distress.  Neck - supple without lymphadenopathy or thyromegaly.  Lungs - clear to auscultation bilaterally.  Heart - regular rate and rhythm without murmur.  Musculoskeletal - no gross deformities.  Neurological - normal strength, sensation, and mental status.      Assessment:    Arlen PENA  Nataliia is a 21 year old  who presents today to discuss recurrent BV, vaginal discharge.    Plan:  1. Vaginal discharge  Self collected wet prep obtained prior to appointment.  Showed yeast infection.  GC/CT pending, although unlikely to be positive given no new partners since last test.  - Wet preparation  - NEISSERIA GONORRHOEA PCR  - CHLAMYDIA TRACHOMATIS PCR    2. Candidiasis of vagina  Will treat with a single dose of diflucan, as this has worked in the past  - fluconazole (DIFLUCAN) 150 MG tablet; Take 1 tablet (150 mg) by mouth once for 1 dose  Dispense: 1 tablet; Refill: 0    3. Mild intermittent asthma without complication  Lungs clear today without evidence of pneumonia, so reasonable to try inhaler.  Discussed this isn't my area of expertise, so if no improvement, needs to see PCP  - albuterol (PROAIR HFA/PROVENTIL HFA/VENTOLIN HFA) 108 (90 Base) MCG/ACT inhaler; Inhale 2 puffs into the lungs every 6 hours as needed for shortness of breath / dyspnea or wheezing  Dispense: 18 g; Refill: 0    4. Seasonal allergic rhinitis, unspecified trigger  Recommended a daily Claritin to help with seasonal allergies.    RTC prn.    Ruby Zepeda MD

## 2022-05-14 LAB
C TRACH DNA SPEC QL NAA+PROBE: NEGATIVE
N GONORRHOEA DNA SPEC QL NAA+PROBE: NEGATIVE

## 2022-05-18 ENCOUNTER — NURSE TRIAGE (OUTPATIENT)
Dept: NURSING | Facility: CLINIC | Age: 22
End: 2022-05-18

## 2022-05-18 RX ORDER — FLUCONAZOLE 150 MG/1
150 TABLET ORAL ONCE
Qty: 1 TABLET | Refills: 0 | Status: CANCELLED
Start: 2022-05-18 | End: 2022-05-18

## 2022-05-18 NOTE — TELEPHONE ENCOUNTER
Patient request to her GYN. Looking for refill on diflucan.  Picking up doxycyline today for sinus infection and usually gets yeast infection on antibiotics.    Uses [facility] lydia Husain RN  [facility] Nurse Advisor

## 2022-05-21 ENCOUNTER — OFFICE VISIT (OUTPATIENT)
Dept: URGENT CARE | Facility: URGENT CARE | Age: 22
End: 2022-05-21
Payer: COMMERCIAL

## 2022-05-21 VITALS
TEMPERATURE: 98.2 F | RESPIRATION RATE: 18 BRPM | DIASTOLIC BLOOD PRESSURE: 73 MMHG | HEIGHT: 68 IN | BODY MASS INDEX: 24.55 KG/M2 | HEART RATE: 83 BPM | SYSTOLIC BLOOD PRESSURE: 126 MMHG | WEIGHT: 162 LBS | OXYGEN SATURATION: 99 %

## 2022-05-21 DIAGNOSIS — J45.21 MILD INTERMITTENT REACTIVE AIRWAY DISEASE WITH ACUTE EXACERBATION: Primary | ICD-10-CM

## 2022-05-21 DIAGNOSIS — J01.40 ACUTE NON-RECURRENT PANSINUSITIS: ICD-10-CM

## 2022-05-21 PROCEDURE — 99213 OFFICE O/P EST LOW 20 MIN: CPT | Performed by: PHYSICIAN ASSISTANT

## 2022-05-21 RX ORDER — PREDNISONE 20 MG/1
TABLET ORAL
Qty: 20 TABLET | Refills: 0 | Status: SHIPPED | OUTPATIENT
Start: 2022-05-21 | End: 2022-10-10

## 2022-05-21 NOTE — PROGRESS NOTES
Mild intermittent reactive airway disease with acute exacerbation  - predniSONE (DELTASONE) 20 MG tablet; Take 3 tabs by mouth daily x 3 days, then 2 tabs daily x 3 days, then 1 tab daily x 3 days, then 1/2 tab daily x 3 days.    Acute non-recurrent pansinusitis  - Adult ENT  Referral; Future    Age 12 months or more  Okay to use Zarbee's   Okay to use Rx Children Tylenol if prescribed (Dose based on weight)    Age 2-12:   Okay to use Children Motrin or Tylenol over the counter.    Adults:  Okay to take acetaminophen 500 mg- 2 tabs (Total of 1000 mg) every 8 hrs   Okay to take ibuprofen 200 mg- 3 tabs (Total of 600 mg) every 6 hours        Okay to use Neti pot for sinus lavage up to three times daily for congestion and sinus pressure if present. Daily hot shower can be beneficial for congestion and body aches. Okay to use bedroom vaporizer or humidifier if symptoms are worse at night. Nightly Vicks Vapor rub and 5-10 mg of Melatonin okay to use for sleep.     Over the counter cough medication and decongestants okay if not prescribed by me during this visit. For homeopathic alternatives to cough syrup and decongestant, feel free to try Elderberry extract.    Okay to use salt water gargles, warm tea (or warm water with lemon and honey), and lozenges for any throat discomfort. Chloraseptic spray is also highly encourages for throat pain/irritation.     Patient will need to get plenty of rest and drink at least 1.5-2 liters of fluids daily for adults and 1-1.5 liters for children. If vomiting and not tolerating liquids for more than 24 hrs, please go to your nearest emergency department for IV fluids and further treatment.     Patient is not contagious after 1 week from start of symptoms. If possible, wear mask for first 7 days. Wash hands regularly and vigorously for 30 seconds often.         Ritesh Rhodes PA-C  [facility] URG[facility]    Subjective   21 year old who presents to clinic today for the  following health issues:    Urgent Care and Cough       HPI     Acute Illness  Acute illness concerns: sneezing, runny nose, coughing, congestion  Onset/Duration: 1 month   Symptoms:  Fever: no  Chills/Sweats: no  Headache (location?): no  Sinus Pressure: YES  Conjunctivitis:  no  Ear Pain: Ears feel plugged  Rhinorrhea: YES  Congestion: YES  Sore Throat: no  Cough: YES  Wheeze: YES when she gets into coughing attacks   Decreased Appetite: no  Nausea: no  Vomiting: no  Diarrhea: no  Dysuria/Freq.: no  Dysuria or Hematuria: no  Fatigue/Achiness: Fatigue but no body aches  Sick/Strep Exposure: no  Therapies tried and outcome: Zyrtec, claritin, albuterol, fluticisone, mucinex, neti pot.     Patient has a history of sinus infections.     Patient was treated with amoxicillin which did not improve the situation.     Review of Systems   Review of Systems   See HPI     Objective    Temp: 98.2  F (36.8  C) Temp src: Oral BP: 126/73 Pulse: 83   Resp: 18 SpO2: 99 %       Physical Exam   Physical Exam  Constitutional:       General: She is not in acute distress.     Appearance: Normal appearance. She is normal weight. She is not ill-appearing, toxic-appearing or diaphoretic.   HENT:      Head: Normocephalic and atraumatic.      Right Ear: Tympanic membrane, ear canal and external ear normal. There is no impacted cerumen.      Left Ear: Tympanic membrane, ear canal and external ear normal. There is no impacted cerumen.      Nose: Congestion and rhinorrhea present.      Mouth/Throat:      Mouth: Mucous membranes are moist.      Pharynx: Posterior oropharyngeal erythema present. No oropharyngeal exudate.   Cardiovascular:      Rate and Rhythm: Normal rate and regular rhythm.      Pulses: Normal pulses.      Heart sounds: Normal heart sounds. No murmur heard.    No friction rub. No gallop.   Pulmonary:      Effort: Pulmonary effort is normal. No respiratory distress.      Breath sounds: Normal breath sounds. No stridor. No  wheezing, rhonchi or rales.   Chest:      Chest wall: No tenderness.   Neurological:      General: No focal deficit present.      Mental Status: She is alert and oriented to person, place, and time. Mental status is at baseline.      Gait: Gait normal.   Psychiatric:         Mood and Affect: Mood normal.         Behavior: Behavior normal.         Thought Content: Thought content normal.         Judgment: Judgment normal.          No results found for this or any previous visit (from the past 24 hour(s)).

## 2022-06-16 ENCOUNTER — LAB (OUTPATIENT)
Dept: LAB | Facility: CLINIC | Age: 22
End: 2022-06-16
Payer: COMMERCIAL

## 2022-06-16 DIAGNOSIS — N89.8 VAGINAL ITCHING: ICD-10-CM

## 2022-06-16 DIAGNOSIS — N89.8 VAGINAL DISCHARGE: Primary | ICD-10-CM

## 2022-06-16 PROCEDURE — 87210 SMEAR WET MOUNT SALINE/INK: CPT

## 2022-06-16 RX ORDER — METRONIDAZOLE 7.5 MG/G
1 GEL VAGINAL DAILY
Qty: 70 G | Refills: 0 | Status: SHIPPED | OUTPATIENT
Start: 2022-06-16 | End: 2022-10-10

## 2022-06-16 NOTE — PROGRESS NOTES
For recurrent BV we usually recommend using the vaginal gel twice a week for 3-6 months. If she wants to discuss this or other regimens (like boric acid) further I recommend she make a clinic or phone visit.  Thanks,  Ruby Lei MD

## 2022-06-16 NOTE — PROGRESS NOTES
Call to [facility] to discuss medication options for BV. Opts for gel. Rx sent. Did discuss common preventative measures that can be used like cotton underwear, condoms when having new sexual partners, boric acid OTC, and no scented products. She will consider these. Does mention that  had discussed longer term treatment with her previously for recurrent BV, wonders what that would entail, routing to .  Addendum: [employer] message sent to [facility] with  recommendations.  Kajal Canales RN    From: Nusrat Xavier  To: Chelsea Beavers MD  Sent: 6/19/2017 7:59 AM CDT  Subject: Cat Scan    I am curious about the results of the cat scan. I understand that there is a spot on my right lung. How big is it? Do you have any idea what caused it, or what it is? Is there any other test we can do to determine how much of a threat this is to my life? I am very anxious about this, I guess you can tell.    Thanks,    Danielle

## 2022-08-11 ENCOUNTER — PATIENT OUTREACH (OUTPATIENT)
Dept: OBGYN | Facility: CLINIC | Age: 22
End: 2022-08-11

## 2022-08-11 DIAGNOSIS — R87.610 ATYPICAL SQUAMOUS CELLS OF UNDETERMINED SIGNIFICANCE (ASCUS) ON PAPANICOLAOU SMEAR OF CERVIX: ICD-10-CM

## 2022-09-03 ENCOUNTER — HEALTH MAINTENANCE LETTER (OUTPATIENT)
Age: 22
End: 2022-09-03

## 2022-09-05 ENCOUNTER — OFFICE VISIT (OUTPATIENT)
Dept: URGENT CARE | Facility: URGENT CARE | Age: 22
End: 2022-09-05
Payer: COMMERCIAL

## 2022-09-05 VITALS
SYSTOLIC BLOOD PRESSURE: 132 MMHG | DIASTOLIC BLOOD PRESSURE: 82 MMHG | TEMPERATURE: 98.1 F | OXYGEN SATURATION: 96 % | HEART RATE: 83 BPM

## 2022-09-05 DIAGNOSIS — R22.1 THROAT SWELLING: ICD-10-CM

## 2022-09-05 DIAGNOSIS — Z78.9 BODY PIERCING: ICD-10-CM

## 2022-09-05 DIAGNOSIS — L03.90 CELLULITIS, UNSPECIFIED CELLULITIS SITE: ICD-10-CM

## 2022-09-05 DIAGNOSIS — J02.0 STREP THROAT: Primary | ICD-10-CM

## 2022-09-05 LAB — DEPRECATED S PYO AG THROAT QL EIA: POSITIVE

## 2022-09-05 PROCEDURE — 87880 STREP A ASSAY W/OPTIC: CPT | Performed by: PHYSICIAN ASSISTANT

## 2022-09-05 PROCEDURE — 99214 OFFICE O/P EST MOD 30 MIN: CPT | Performed by: PHYSICIAN ASSISTANT

## 2022-09-05 RX ORDER — PREDNISONE 20 MG/1
20 TABLET ORAL 2 TIMES DAILY
Qty: 6 TABLET | Refills: 0 | Status: SHIPPED | OUTPATIENT
Start: 2022-09-05 | End: 2022-09-08

## 2022-09-05 NOTE — PROGRESS NOTES
Assessment & Plan     Strep throat    You have had a positive test for strep throat. Strep throat is a contagious illness. It's spread by coughing, kissing, sharing glasses or eating utensils, or by touching others after touching your mouth or nose. Symptoms include throat pain that is worse with swallowing, aching all over, headache, swollen lymph nodes at the front of the neck, and red swollen tonsils sometimes with white patches and fever. It's treated with antibiotic medicine. This should help you start to feel better in 1 to 2 days.     Strep positive   augmentin for strep and cellulitis  Magic mouthwash for throat pain    - Streptococcus A Rapid Screen w/Reflex to PCR - Clinic Collect  - amoxicillin-clavulanate (AUGMENTIN) 875-125 MG tablet; Take 1 tablet by mouth 2 times daily for 10 days  - magic mouthwash (ENTER INGREDIENTS IN COMMENTS) suspension; Swish and spit 5-10 mLs in mouth every 6 hours as needed 30 ml of Benadryl (12.5 mg/5 ml), 60 ml Maalox, 30 ml Viscous Lidocaine    Body piercing    Keep piercing clean   Start antibiotics for infection  - amoxicillin-clavulanate (AUGMENTIN) 875-125 MG tablet; Take 1 tablet by mouth 2 times daily for 10 days    Cellulitis, unspecified cellulitis site    Cellulitis is an infection of the deep layers of skin. A break in the skin, such as a cut or scratch, can let bacteria under the skin. If the bacteria get to deep layers of the skin, it can be serious. If not treated, cellulitis can get into the bloodstream and lymph nodes. The infection can then spread throughout the body. This causes serious illness.   Cellulitis causes the affected skin to become red, swollen, warm, and sore. The reddened areas have a visible border. An open sore may leak fluid (pus). You may have a fever, chills, and pain.   Cellulitis is treated with antibiotics taken for 7 to 10 days. An open sore may be cleaned and covered with cool wet gauze. Symptoms should get better 1 to 2 days after  treatment is started. Make sure to take all the antibiotics for the full number of days until they are gone. Keep taking the medicine even if your symptoms go away.     - amoxicillin-clavulanate (AUGMENTIN) 875-125 MG tablet; Take 1 tablet by mouth 2 times daily for 10 days    Throat swelling    Secondary to strep throat  Start prednisone  - predniSONE (DELTASONE) 20 MG tablet; Take 1 tablet (20 mg) by mouth 2 times daily for 3 days    Review of external notes as documented elsewhere in note    At today's visit with Arlen BECKY Gracespencer , we discussed results, diagnosis, medications and formulated a plan.  We also discussed red flags for immediate return to clinic/ER, as well as indications for follow up with PCP if not improved in 3 days. Patient understood and agreed to plan. Arlen William was discharged with stable vitals and has no further questions.       No follow-ups on file.    Ritesh Burger, [facility], PA-JUSTIN  [facility]RG[facility]ARE RYLEE Mcmillan is a 22 year old, presenting for the following health issues:  Pharyngitis (Poss strep, also think she has some infection on her breast piercing )      HPI   Review of Systems   Constitutional, HEENT, cardiovascular, pulmonary, GI, , musculoskeletal, neuro, skin, endocrine and psych systems are negative, except as otherwise noted.      Objective    /82   Pulse 83   Temp 98.1  F (36.7  C)   SpO2 96%   There is no height or weight on file to calculate BMI.  Physical Exam   GENERAL: healthy, alert and no distress  EYES: Eyes grossly normal to inspection, PERRL and conjunctivae and sclerae normal  HENT: tonsillar hypertrophy, tonsillar erythema and tonsillar exudate  NECK: bilateral anterior cervical adenopathy, no asymmetry, masses, or scars and thyroid normal to palpation  RESP: lungs clear to auscultation - no rales, rhonchi or wheezes  BREAST: Positive for infection right nipple piercing  CV: regular rate and rhythm, normal S1 S2, no S3  or S4, no murmur, click or rub, no peripheral edema and peripheral pulses strong  MS: no gross musculoskeletal defects noted, no edema  SKIN: Positive for mild erythema of nipple and breast  NEURO: Normal strength and tone, mentation intact and speech normal  PSYCH: mentation appears normal, affect normal/bright    Results for orders placed or performed in visit on 09/05/22   Streptococcus A Rapid Screen w/Reflex to PCR - Clinic Collect     Status: Abnormal    Specimen: Throat; Swab   Result Value Ref Range    Group A Strep antigen Positive (A) Negative                     [unfilled]  @[facility]@

## 2022-09-20 ENCOUNTER — OFFICE VISIT (OUTPATIENT)
Dept: URGENT CARE | Facility: URGENT CARE | Age: 22
End: 2022-09-20
Payer: COMMERCIAL

## 2022-09-20 VITALS
OXYGEN SATURATION: 100 % | TEMPERATURE: 98.2 F | HEIGHT: 68 IN | WEIGHT: 160 LBS | HEART RATE: 75 BPM | SYSTOLIC BLOOD PRESSURE: 114 MMHG | DIASTOLIC BLOOD PRESSURE: 76 MMHG | BODY MASS INDEX: 24.25 KG/M2

## 2022-09-20 DIAGNOSIS — J02.0 STREPTOCOCCAL PHARYNGITIS: Primary | ICD-10-CM

## 2022-09-20 DIAGNOSIS — R07.0 THROAT PAIN: ICD-10-CM

## 2022-09-20 LAB — DEPRECATED S PYO AG THROAT QL EIA: POSITIVE

## 2022-09-20 PROCEDURE — 99214 OFFICE O/P EST MOD 30 MIN: CPT | Performed by: PHYSICIAN ASSISTANT

## 2022-09-20 PROCEDURE — 87880 STREP A ASSAY W/OPTIC: CPT | Performed by: PHYSICIAN ASSISTANT

## 2022-09-20 RX ORDER — PENICILLIN V POTASSIUM 500 MG/1
500 TABLET, FILM COATED ORAL 2 TIMES DAILY
Qty: 20 TABLET | Refills: 0 | Status: SHIPPED | OUTPATIENT
Start: 2022-09-20 | End: 2022-09-30

## 2022-09-20 ASSESSMENT — PAIN SCALES - GENERAL: PAINLEVEL: MODERATE PAIN (5)

## 2022-09-20 NOTE — PROGRESS NOTES
SUBJECTIVE:  Arlen William is a 22 year old female with a chief complaint of sore throat.  Onset of symptoms was 3 day(s) ago.    Course of illness: sudden onset.  Severity moderate  Current and Associated symptoms: body aches  Treatment measures tried include recently finished course of amox, missed a few doses.  Predisposing factors include None.    Past Medical History:   Diagnosis Date     Abnormal Pap smear of cervix 08/12/2020    see problem list     Asthma, mild persistent      Concussion 1/2012    Impact test 3/12     Mononucleosis 06/2019     Current Outpatient Medications   Medication Sig Dispense Refill     Biotin 10 MG CAPS        Cholecalciferol (VITAMIN D) 1000 UNITS capsule Take 1 capsule by mouth daily.       levonorgestrel (MIRENA) 20 MCG/24HR IUD 1 each (20 mcg) by Intrauterine route once       Multiple Vitamin (MULTIVITAMINS PO) Take 1 tablet by mouth daily.       penicillin V (VEETID) 500 MG tablet Take 1 tablet (500 mg) by mouth 2 times daily for 10 days 20 tablet 0     albuterol (PROAIR HFA/PROVENTIL HFA/VENTOLIN HFA) 108 (90 Base) MCG/ACT inhaler Inhale 2 puffs into the lungs every 6 hours as needed for shortness of breath / dyspnea or wheezing (Patient not taking: Reported on 9/20/2022) 18 g 0     doxycycline monohydrate (ADOXA) 100 MG tablet Take 1 tablet (100 mg) by mouth 2 times daily (Patient not taking: No sig reported) 14 tablet 0     doxycycline monohydrate (MONODOX) 100 MG capsule Take 1 capsule (100 mg) by mouth 2 times daily (Patient not taking: Reported on 9/20/2022) 20 capsule 0     magic mouthwash (ENTER INGREDIENTS IN COMMENTS) suspension Swish and spit 5-10 mLs in mouth every 6 hours as needed 30 ml of Benadryl (12.5 mg/5 ml), 60 ml Maalox, 30 ml Viscous Lidocaine (Patient not taking: Reported on 9/20/2022) 120 mL 0     metroNIDAZOLE (METROGEL) 0.75 % vaginal gel Place 1 applicator (5 g) vaginally daily (Patient not taking: Reported on 9/20/2022) 70 g 0     metroNIDAZOLE  "(METROGEL) 0.75 % vaginal gel Place 1 applicator (5 g) vaginally daily 5-7 days, and bi-weekly as needed (Patient not taking: No sig reported) 70 g 1     predniSONE (DELTASONE) 20 MG tablet Take 3 tabs by mouth daily x 3 days, then 2 tabs daily x 3 days, then 1 tab daily x 3 days, then 1/2 tab daily x 3 days. 20 tablet 0     Social History     Tobacco Use     Smoking status: Never Smoker     Smokeless tobacco: Never Used     Tobacco comment: no smokers at home   Substance Use Topics     Alcohol use: Yes     Comment: 0-2 per month        ROS:  Review of systems negative except as stated above.    OBJECTIVE:   /76   Pulse 75   Temp 98.2  F (36.8  C) (Tympanic)   Ht 1.727 m (5' 8\")   Wt 72.6 kg (160 lb)   SpO2 100%   BMI 24.33 kg/m    GENERAL APPEARANCE: healthy, alert and no distress  EYES: EOMI,  PERRL, conjunctiva clear  HENT: ear canals and TM's normal.  Nose normal.  Pharynx erythematous with some exudate noted.  NECK: supple, non-tender to palpation, no adenopathy noted  RESP: lungs clear to auscultation - no rales, rhonchi or wheezes  CV: regular rates and rhythm, normal S1 S2, no murmur noted  SKIN: no suspicious lesions or rashes    Results for orders placed or performed in visit on 09/20/22   Streptococcus A Rapid Screen w/Reflex to PCR - Clinic Collect     Status: Abnormal    Specimen: Throat; Swab   Result Value Ref Range    Group A Strep antigen Positive (A) Negative         ASSESSMENT:    (J02.0) Streptococcal pharyngitis  (primary encounter diagnosis)  Plan: penicillin V (VEETID) 500 MG tablet      (R07.0) Throat pain  Plan: Streptococcus A Rapid Screen w/Reflex to PCR -         Clinic Collect, Symptomatic; Unknown COVID-19         Virus (Coronavirus) by PCR      Covid-19  Pt was evaluated during a global COVID-19 pandemic, which necessitated consideration that the patient might be at risk for infection with the SARS-CoV-2 virus that causes COVID-19.   Applicable protocols for evaluation were " followed during the patient's care.   COVID-19 was considered as part of the patient's evaluation. The plan for testing is:  a test was ordered during this visit.    No severe headache, chest pain, shortness of breath  No additional infectious symptoms  Rest, isolate for results, hydrate, follow up if worsening or new symptoms      Surgical mask, shield, gloves worn by provider      There are no Patient Instructions on file for this visit.

## 2022-10-10 ENCOUNTER — OFFICE VISIT (OUTPATIENT)
Dept: FAMILY MEDICINE | Facility: CLINIC | Age: 22
End: 2022-10-10
Payer: COMMERCIAL

## 2022-10-10 VITALS
HEIGHT: 68 IN | TEMPERATURE: 97.4 F | BODY MASS INDEX: 24.4 KG/M2 | WEIGHT: 161 LBS | SYSTOLIC BLOOD PRESSURE: 131 MMHG | HEART RATE: 88 BPM | OXYGEN SATURATION: 99 % | DIASTOLIC BLOOD PRESSURE: 85 MMHG

## 2022-10-10 DIAGNOSIS — Z11.3 SCREEN FOR STD (SEXUALLY TRANSMITTED DISEASE): ICD-10-CM

## 2022-10-10 DIAGNOSIS — N89.8 VAGINAL DISCHARGE: ICD-10-CM

## 2022-10-10 DIAGNOSIS — Z12.4 SCREENING FOR CERVICAL CANCER: ICD-10-CM

## 2022-10-10 DIAGNOSIS — L23.9 ALLERGIC CONTACT DERMATITIS, UNSPECIFIED TRIGGER: ICD-10-CM

## 2022-10-10 DIAGNOSIS — B96.89 BV (BACTERIAL VAGINOSIS): ICD-10-CM

## 2022-10-10 DIAGNOSIS — J03.01 RECURRENT STREPTOCOCCAL TONSILLITIS: Primary | ICD-10-CM

## 2022-10-10 DIAGNOSIS — N76.0 BV (BACTERIAL VAGINOSIS): ICD-10-CM

## 2022-10-10 LAB
ALBUMIN SERPL-MCNC: 3.4 G/DL (ref 3.4–5)
ALBUMIN UR-MCNC: NEGATIVE MG/DL
ALP SERPL-CCNC: 68 U/L (ref 40–150)
ALT SERPL W P-5'-P-CCNC: 31 U/L (ref 0–50)
ANION GAP SERPL CALCULATED.3IONS-SCNC: 4 MMOL/L (ref 3–14)
APPEARANCE UR: CLEAR
AST SERPL W P-5'-P-CCNC: 21 U/L (ref 0–45)
BILIRUB SERPL-MCNC: 0.6 MG/DL (ref 0.2–1.3)
BILIRUB UR QL STRIP: NEGATIVE
BUN SERPL-MCNC: 9 MG/DL (ref 7–30)
CALCIUM SERPL-MCNC: 9 MG/DL (ref 8.5–10.1)
CHLORIDE BLD-SCNC: 107 MMOL/L (ref 94–109)
CLUE CELLS: PRESENT
CO2 SERPL-SCNC: 28 MMOL/L (ref 20–32)
COLOR UR AUTO: YELLOW
CREAT SERPL-MCNC: 0.69 MG/DL (ref 0.52–1.04)
DEPRECATED S PYO AG THROAT QL EIA: POSITIVE
ERYTHROCYTE [DISTWIDTH] IN BLOOD BY AUTOMATED COUNT: 12 % (ref 10–15)
GFR SERPL CREATININE-BSD FRML MDRD: >90 ML/MIN/1.73M2
GLUCOSE BLD-MCNC: 89 MG/DL (ref 70–99)
GLUCOSE UR STRIP-MCNC: NEGATIVE MG/DL
HCT VFR BLD AUTO: 41 % (ref 35–47)
HGB BLD-MCNC: 13.6 G/DL (ref 11.7–15.7)
HGB UR QL STRIP: NEGATIVE
KETONES UR STRIP-MCNC: NEGATIVE MG/DL
LEUKOCYTE ESTERASE UR QL STRIP: NEGATIVE
MCH RBC QN AUTO: 29.8 PG (ref 26.5–33)
MCHC RBC AUTO-ENTMCNC: 33.2 G/DL (ref 31.5–36.5)
MCV RBC AUTO: 90 FL (ref 78–100)
NITRATE UR QL: NEGATIVE
PH UR STRIP: 6 [PH] (ref 5–7)
PLATELET # BLD AUTO: 296 10E3/UL (ref 150–450)
POTASSIUM BLD-SCNC: 4 MMOL/L (ref 3.4–5.3)
PROT SERPL-MCNC: 6.3 G/DL (ref 6.8–8.8)
RBC # BLD AUTO: 4.57 10E6/UL (ref 3.8–5.2)
SODIUM SERPL-SCNC: 139 MMOL/L (ref 133–144)
SP GR UR STRIP: 1.02 (ref 1–1.03)
TRICHOMONAS, WET PREP: ABNORMAL
UROBILINOGEN UR STRIP-ACNC: 0.2 E.U./DL
WBC # BLD AUTO: 11.3 10E3/UL (ref 4–11)
WBC'S/HIGH POWER FIELD, WET PREP: ABNORMAL
YEAST, WET PREP: ABNORMAL

## 2022-10-10 PROCEDURE — 99214 OFFICE O/P EST MOD 30 MIN: CPT | Performed by: NURSE PRACTITIONER

## 2022-10-10 PROCEDURE — 87210 SMEAR WET MOUNT SALINE/INK: CPT | Performed by: NURSE PRACTITIONER

## 2022-10-10 PROCEDURE — G0145 SCR C/V CYTO,THINLAYER,RESCR: HCPCS | Performed by: NURSE PRACTITIONER

## 2022-10-10 PROCEDURE — G0124 SCREEN C/V THIN LAYER BY MD: HCPCS | Performed by: PATHOLOGY

## 2022-10-10 PROCEDURE — 87491 CHLMYD TRACH DNA AMP PROBE: CPT | Performed by: NURSE PRACTITIONER

## 2022-10-10 PROCEDURE — 85027 COMPLETE CBC AUTOMATED: CPT | Performed by: NURSE PRACTITIONER

## 2022-10-10 PROCEDURE — 87880 STREP A ASSAY W/OPTIC: CPT | Performed by: NURSE PRACTITIONER

## 2022-10-10 PROCEDURE — 87591 N.GONORRHOEAE DNA AMP PROB: CPT | Performed by: NURSE PRACTITIONER

## 2022-10-10 PROCEDURE — 81003 URINALYSIS AUTO W/O SCOPE: CPT | Performed by: NURSE PRACTITIONER

## 2022-10-10 PROCEDURE — 36415 COLL VENOUS BLD VENIPUNCTURE: CPT | Performed by: NURSE PRACTITIONER

## 2022-10-10 PROCEDURE — 80053 COMPREHEN METABOLIC PANEL: CPT | Performed by: NURSE PRACTITIONER

## 2022-10-10 RX ORDER — TRIAMCINOLONE ACETONIDE 5 MG/G
CREAM TOPICAL 2 TIMES DAILY
Qty: 15 G | Refills: 0 | Status: SHIPPED | OUTPATIENT
Start: 2022-10-10 | End: 2022-12-21

## 2022-10-10 RX ORDER — PREDNISONE 20 MG/1
40 TABLET ORAL DAILY
Qty: 10 TABLET | Refills: 0 | Status: SHIPPED | OUTPATIENT
Start: 2022-10-10 | End: 2022-10-15

## 2022-10-10 RX ORDER — CEFDINIR 300 MG/1
300 CAPSULE ORAL 2 TIMES DAILY
Qty: 20 CAPSULE | Refills: 0 | Status: SHIPPED | OUTPATIENT
Start: 2022-10-10 | End: 2022-10-20

## 2022-10-10 RX ORDER — CLINDAMYCIN PHOSPHATE 20 MG/G
1 CREAM VAGINAL AT BEDTIME
Qty: 40 G | Refills: 0 | Status: SHIPPED | OUTPATIENT
Start: 2022-10-10 | End: 2022-10-17

## 2022-10-10 ASSESSMENT — ASTHMA QUESTIONNAIRES
QUESTION_1 LAST FOUR WEEKS HOW MUCH OF THE TIME DID YOUR ASTHMA KEEP YOU FROM GETTING AS MUCH DONE AT WORK, SCHOOL OR AT HOME: NONE OF THE TIME
QUESTION_4 LAST FOUR WEEKS HOW OFTEN HAVE YOU USED YOUR RESCUE INHALER OR NEBULIZER MEDICATION (SUCH AS ALBUTEROL): NOT AT ALL
ACT_TOTALSCORE: 25
QUESTION_3 LAST FOUR WEEKS HOW OFTEN DID YOUR ASTHMA SYMPTOMS (WHEEZING, COUGHING, SHORTNESS OF BREATH, CHEST TIGHTNESS OR PAIN) WAKE YOU UP AT NIGHT OR EARLIER THAN USUAL IN THE MORNING: NOT AT ALL
ACT_TOTALSCORE: 25
QUESTION_2 LAST FOUR WEEKS HOW OFTEN HAVE YOU HAD SHORTNESS OF BREATH: NOT AT ALL
QUESTION_5 LAST FOUR WEEKS HOW WOULD YOU RATE YOUR ASTHMA CONTROL: COMPLETELY CONTROLLED

## 2022-10-10 NOTE — PROGRESS NOTES
Assessment & Plan     Recurrent streptococcal tonsillitis  Unfortunately has had strep likely reoccurring this is the third time in less than a month.  2 rounds of penicillin antibiotics will switch class to cephalosporin; cefdinir x10 days.  Quite swollen back of throat we will also give 5 days of steroids.  Labs just to ensure no concern for decreased immunity.  Discussed with her likely a resistant strain of strep recommend ENT follow-up.  As she has a history of having multiple occurrences of strep.  Red flag symptoms discussed and if these occur present to the emergency room or call 911.  Arlen verbalizes understanding of plan of care and is in agreement.   - Streptococcus A Rapid Screen w/Reflex to PCR - Clinic Collect  - CBC with platelets  - cefdinir (OMNICEF) 300 MG capsule  Dispense: 20 capsule; Refill: 0  - Adult ENT  Referral  - Comprehensive metabolic panel (BMP + Alb, Alk Phos, ALT, AST, Total. Bili, TP)  - predniSONE (DELTASONE) 20 MG tablet  Dispense: 10 tablet; Refill: 0  - CBC with platelets  - Comprehensive metabolic panel (BMP + Alb, Alk Phos, ALT, AST, Total. Bili, TP)    Vaginal discharge  Positive for BV today we will treat with clindamycin 7 metronidazole.  Follow-up with OB/GYN as scheduled.  Encourage conversation about IUD as she has had multiple vaginal infection/discharge since IUD placed a year ago.  - UA Macro with Reflex to Micro and Culture - lab collect  - Wet prep - Clinic Collect    Screen for STD (sexually transmitted disease)    - NEISSERIA GONORRHOEA PCR  - CHLAMYDIA TRACHOMATIS PCR    Screening for cervical cancer    - Pap imaged thin layer screen only - recommended age 21 - 24 years    BV (bacterial vaginosis)    - clindamycin (CLEOCIN) 2 % vaginal cream  Dispense: 40 g; Refill: 0    Allergic contact dermatitis, unspecified trigger  Irritation around nipples looks like a contact allergy/dermatitis.  Triamcinolone to the area twice daily for no more than 2 weeks  encourage her to switch back to piercings that did not cause this.  Does not appear to be infected.  - triamcinolone (ARISTOCORT HP) 0.5 % external cream  Dispense: 15 g; Refill: 0      Return in 6 weeks (on 11/18/2022) for wellness exam.      LEONARDO Canales CNP  [facility] PRIOR SHAKA Mcmillan is a 22 year old, presenting for the following health issues:  Pharyngitis    Urgent Care - positive strep 09/05/2022- Completed Magic Mouth Wash and Augmentin and prednisone  09/20/2022 - positive  Strep - Urgent Care - Completed - Pencillin    Still has sore throat - went away for approx 1 week, spitting up phlegm. Known problem has had for years and has been to an ENT. Gets worse with illness.      Approx 1 month - Infected piercings - has had for 4 years nipple piercing's. Redness and crusty. Cleans everyday with with spray.      History of Present Illness       Reason for visit:  Womens health concerns, soar throat, infected piercing    She eats 2-3 servings of fruits and vegetables daily.She consumes 0 sweetened beverage(s) daily.She exercises with enough effort to increase her heart rate 30 to 60 minutes per day.  She exercises with enough effort to increase her heart rate 4 days per week.   She is taking medications regularly.       Vaginal Symptoms  Onset/Duration: Middle of summer -- has been taking antibiotics -   Description:  Vaginal Discharge: white to yellow   Itching (Pruritis): YES  Burning sensation:  No  Odor: YES  Accompanying Signs & Symptoms:  Urinary symptoms: Sometimes cloudy  Abdominal pain: YES- cramps in middle of umbilical and cramping in vaginal wall  Fever: No  History:   Sexually active: YES  New Partner: YES  Possibility of Pregnancy:  No  Recent antibiotic use: YES  Previous vaginitis issues: YES  Precipitating or alleviating factors: IUD placed while having these issues  Therapies tried and outcome: Boric Acid - helps for couple days    Had pap smear with OBGYN  "team when IUD placed due this year.   New sexual partner.  History of Strep treated x2 in the last month.  Augmentin x10 days the first time; penicillin 500 twice daily x10 days    Review of Systems   Constitutional, HEENT, cardiovascular, pulmonary, GI, , musculoskeletal, neuro, skin, endocrine and psych systems are negative, except as otherwise noted in the HPI.      Objective    /85 (BP Location: Right arm, Patient Position: Chair, Cuff Size: Adult Regular)   Pulse 88   Temp 97.4  F (36.3  C) (Tympanic)   Ht 1.727 m (5' 8\")   Wt 73 kg (161 lb)   SpO2 99%   BMI 24.48 kg/m    Body mass index is 24.48 kg/m .  Physical Exam   GENERAL: healthy, alert and no distress  HENT: ear canals and TM's normal, nose and bilateral erythematous edematous tonsils with mild exudate edematous uvula  NECK: no adenopathy, no asymmetry, masses, or scars and thyroid normal to palpation  RESP: lungs clear to auscultation - no rales, rhonchi or wheezes  CV: regular rate and rhythm, normal S1 S2, no S3 or S4, no murmur, click or rub, no peripheral edema and peripheral pulses strong  ABDOMEN: soft, nontender, no hepatosplenomegaly, no masses and bowel sounds normal   (female): normal female external genitalia, normal urethral meatus, vaginal mucosa, normal cervix/adnexa/uterus without masses; white somewhat thick discharge  SKIN: Bilateral nipples edematous and minimal crusting with piercings noted  LYMPH: no cervical, supraclavicular, axillary, or inguinal adenopathy    Results for orders placed or performed in visit on 10/10/22   UA Macro with Reflex to Micro and Culture - lab collect     Status: Normal    Specimen: Urine, Clean Catch   Result Value Ref Range    Color Urine Yellow Colorless, Straw, Light Yellow, Yellow    Appearance Urine Clear Clear    Glucose Urine Negative Negative mg/dL    Bilirubin Urine Negative Negative    Ketones Urine Negative Negative mg/dL    Specific Gravity Urine 1.025 1.003 - 1.035    Blood " Urine Negative Negative    pH Urine 6.0 5.0 - 7.0    Protein Albumin Urine Negative Negative mg/dL    Urobilinogen Urine 0.2 0.2, 1.0 E.U./dL    Nitrite Urine Negative Negative    Leukocyte Esterase Urine Negative Negative    Narrative    Microscopic not indicated   Streptococcus A Rapid Screen w/Reflex to PCR - Clinic Collect     Status: Abnormal    Specimen: Throat; Swab   Result Value Ref Range    Group A Strep antigen Positive (A) Negative   Wet prep - Clinic Collect     Status: Abnormal    Specimen: Vagina; Swab   Result Value Ref Range    Trichomonas Absent Absent    Yeast Absent Absent    Clue Cells Present (A) Absent    WBCs/high power field 1+ (A) None

## 2022-10-11 LAB
C TRACH DNA SPEC QL NAA+PROBE: NEGATIVE
N GONORRHOEA DNA SPEC QL NAA+PROBE: NEGATIVE

## 2022-10-12 NOTE — RESULT ENCOUNTER NOTE
Dear Hipolito,    Here is a summary of your recent test results:    Labs overall look good there is no STD.  You have slight elevation of your white blood cells but you have strep so that is understandable.  Encourage follow-up as planned with OB/GYN for recurrent BV.    For additional lab test information, [email or web address] is an excellent reference.    Please call us at [phone] (or use Awesome[employer]) to address the above recommendations if needed.    Thank you for choosing [facility].  It was an honor and a privilege to participate in your care.       Healthy regards,    Nuria Estrada, JUICEP  [facility]

## 2022-10-15 LAB
BKR LAB AP GYN ADEQUACY: ABNORMAL
BKR LAB AP GYN INTERPRETATION: ABNORMAL
BKR LAB AP HPV REFLEX: NO
BKR LAB AP PREVIOUS ABNL DX: ABNORMAL
BKR LAB AP PREVIOUS ABNORMAL: ABNORMAL
PATH REPORT.COMMENTS IMP SPEC: ABNORMAL
PATH REPORT.COMMENTS IMP SPEC: ABNORMAL
PATH REPORT.RELEVANT HX SPEC: ABNORMAL

## 2022-10-17 ENCOUNTER — PATIENT OUTREACH (OUTPATIENT)
Dept: FAMILY MEDICINE | Facility: CLINIC | Age: 22
End: 2022-10-17

## 2022-10-24 ENCOUNTER — TELEPHONE (OUTPATIENT)
Dept: OTOLARYNGOLOGY | Facility: CLINIC | Age: 22
End: 2022-10-24

## 2022-10-24 NOTE — TELEPHONE ENCOUNTER
Spoke to pt and informed her that due to the provider being out sick this afternoon we would have to reschedule her appt today. Pt was understanding and agreeable to this new plan. Pt was rescheduled for 10/28/22 at 740am.

## 2022-11-07 NOTE — TELEPHONE ENCOUNTER
FUTURE VISIT INFORMATION      FUTURE VISIT INFORMATION:    Date: 11/11/22    Time: 2pm    Location: [facility] [place]   REFERRAL INFORMATION:    Referring provider:  Nuria Estrada APRN CNP    Referring providers clinic:  [facility]     Reason for visit/diagnosis  new per pt/ref for Recurrent streptococcal tonsillitis, Ref Prov: Nuria Estrada CNP, Recs/ref in [facility]. [facility] [place] location verified    RECORDS REQUESTED FROM:       Clinic name Comments Records Status Imaging Status   [facility]  10/10/22- note from Nuria Estrada APRN CNP  9/20/22- NOTE FROM Ovidio Felder PA-C  9/5/22- note from Ritesh Burger PA-C  10/10/16- note from Laurie Pride MD Epic     image 12/28/16- xr esophagram  Epic  pacs

## 2022-11-11 ENCOUNTER — PRE VISIT (OUTPATIENT)
Dept: OTOLARYNGOLOGY | Facility: CLINIC | Age: 22
End: 2022-11-11

## 2022-11-11 NOTE — TELEPHONE ENCOUNTER
FUTURE VISIT INFORMATION        FUTURE VISIT INFORMATION:    Date: 1/20/23    Time: 8:30am    Location: [facility] [place]   REFERRAL INFORMATION:    Referring provider:  Nuria Estrada APRN CNP    Referring providers clinic:  [facility]     Reason for visit/diagnosis  new per pt/ref for Recurrent streptococcal tonsillitis, Ref Prov: Nuria Estrada CNP, Recs/ref in [facility]. [facility] [place] location verified     RECORDS REQUESTED FROM:         Clinic name Comments Records Status Imaging Status   [facility]  10/10/22- note from Nuria Estrada APRN CNP  9/20/22- NOTE FROM Ovidio Felder PA-C  9/5/22- note from Ritesh Burger PA-C  10/10/16- note from Laurie Pride MD Epic      image 12/28/16- xr esophagram  Epic  pacs

## 2022-11-28 NOTE — PROGRESS NOTES
History of Present Illness - Arlen William is a very pleasant 22 year old female here to see me for the first time due to recurrent tonsil infections.    She tells me that she has had issues with her throat and tonsils for about 10 years.  She gets recurrent tonsillitis, at least 3-4 times per year.  She also does get sinus infections and bronchitis.    Otherwise no ENT surgery. She also notes that she has reflux that is not treated    Past Medical History -   Patient Active Problem List   Diagnosis     Environmental allergies     Mild persistent asthma     Scoliosis     Leg length discrepancy     Torn ACL     Chronic rhinitis     Tinea versicolor     Dysmenorrhea     Other acne     Chest wall pain     Oropharyngeal dysphagia     Exercise-induced asthma     Nexplanon in place     Atypical squamous cells of undetermined significance (ASCUS) on Papanicolaou smear of cervix     Chronic pain of left knee     Chronic pain of right knee       Current Medications -   Current Outpatient Medications:      albuterol (PROAIR HFA/PROVENTIL HFA/VENTOLIN HFA) 108 (90 Base) MCG/ACT inhaler, Inhale 2 puffs into the lungs every 6 hours as needed for shortness of breath / dyspnea or wheezing, Disp: 18 g, Rfl: 0     Biotin 10 MG CAPS, , Disp: , Rfl:      Cholecalciferol (VITAMIN D) 1000 UNITS capsule, Take 1 capsule by mouth daily., Disp: , Rfl:      levonorgestrel (MIRENA) 20 MCG/24HR IUD, 1 each (20 mcg) by Intrauterine route once, Disp: , Rfl:      Multiple Vitamin (MULTIVITAMINS PO), Take 1 tablet by mouth daily., Disp: , Rfl:      triamcinolone (ARISTOCORT HP) 0.5 % external cream, Apply topically 2 times daily, Disp: 15 g, Rfl: 0    Allergies - No Known Allergies    Social History -   Social History     Socioeconomic History     Marital status: Single   Occupational History     Comment: [employer] stand sometimes    Tobacco Use     Smoking status: Never     Smokeless tobacco: Never     Tobacco comments:     no smokers at  home   Substance and Sexual Activity     Alcohol use: Yes     Comment: 0-2 per month      Drug use: No     Sexual activity: Not Currently     Partners: Male     Birth control/protection: Implant     Comment: Placed 04/2018       Family History -   Family History   Problem Relation Age of Onset     Diabetes Mother         gestational      GERD Mother      Gallbladder Disease Mother      Diabetes Maternal Grandmother      Cancer Maternal Grandmother         ovarian and uterine     Hypertension Father      Lipids Father      Hypertension Paternal Grandfather      Family History Negative No family hx of      Asthma No family hx of         Eosinophilic esophagitis.     C.A.D. No family hx of      Breast Cancer No family hx of      Cerebrovascular Disease No family hx of      Cancer - colorectal No family hx of      Prostate Cancer No family hx of      Alcohol/Drug No family hx of      Allergies No family hx of      Alzheimer Disease No family hx of      Anesthesia Reaction No family hx of      Arthritis No family hx of      Blood Disease No family hx of      Cardiovascular No family hx of      Circulatory No family hx of      Congenital Anomalies No family hx of      Connective Tissue Disorder No family hx of      Depression No family hx of      Endocrine Disease No family hx of      Eye Disorder No family hx of      Genetic Disorder No family hx of      Gastrointestinal Disease No family hx of      Genitourinary Problems No family hx of      Gynecology No family hx of      Musculoskeletal Disorder No family hx of      Anxiety Disorder No family hx of      Mental Illness No family hx of      Substance Abuse No family hx of      Colon Cancer No family hx of      Other Cancer No family hx of      Thyroid Disease No family hx of      Obesity No family hx of      Osteoporosis No family hx of      Unknown/Adopted No family hx of      Hyperlipidemia No family hx of      Coronary Artery Disease No family hx of      Other - See  "Comments No family hx of        Review of Systems - As per HPI and PMHx, otherwise 10+ system review of the head and neck, and general constitution is negative.    Physical Exam  /61   Pulse 69   Resp 16   Ht 1.727 m (5' 7.99\")   Wt 73 kg (161 lb)   SpO2 95%   BMI 24.49 kg/m      General - The patient is well nourished and well developed, and appears to have good nutritional status.  Alert and oriented to person and place, answers questions and cooperates with examination appropriately.   Head and Face - Normocephalic and atraumatic, with no gross asymmetry noted of the contour of the facial features.  The facial nerve is intact, with strong symmetric movements.  Voice and Breathing - The patient was breathing comfortably without the use of accessory muscles. There was no wheezing, stridor, or stertor.  The patients voice was clear and strong, and had appropriate pitch and quality.  Ears - The tympanic membranes are normal in appearance, bony landmarks are intact.  No retraction, perforation, or masses.  No fluid or purulence was seen in the external canal or the middle ear. No evidence of infection of the middle ear or external canal, cerumen was normal in appearance.  Eyes - Extraocular movements intact, and the pupils were reactive to light.  Sclera were not icteric or injected, conjunctiva were pink and moist.  Mouth - Examination of the oral cavity showed pink, healthy oral mucosa. No lesions or ulcerations noted.  The tongue was mobile and midline, and the dentition were in good condition.    Throat - The walls of the oropharynx were smooth, pink, moist, symmetric, and had no lesions or ulcerations.  The tonsillar pillars and soft palate were symmetric.  The uvula was midline on elevation.    Neck - Normal midline excursion of the laryngotracheal complex during swallowing.  Full range of motion on passive movement.  Palpation of the occipital, submental, submandibular, internal jugular chain, and " supraclavicular nodes did not demonstrate any abnormal lymph nodes or masses.  The carotid pulse was palpable bilaterally.  Palpation of the thyroid was soft and smooth, with no nodules or goiter appreciated.  The trachea was mobile and midline.  Nose - External contour is symmetric, no gross deflection or scars.  Nasal mucosa is pink and moist with no abnormal mucus.  The septum was midline and non-obstructive, turbinates of normal size and position.  No polyps, masses, or purulence noted on examination.      A/P - Arlen William is a 22 year old female  (J35.01) Chronic tonsillitis  (primary encounter diagnosis)  (K21.9) LPRD (laryngopharyngeal reflux disease)    For the recurrent infections, I have placed an open Immunology order in case she wants to get a work up.    For her throat symptoms and recurrent tonsillitis, we did discuss tonsillectomy and she will think about it.    However, her history of untreated reflux could be quite significant here.  I have counseled her that reflux can cause irritation of the throat, teeth problems, and increased precipitins towards tonsillitis.    The remainder of today's visit was spent discussing non-medical measures that can help in the management of acid reflux.  Specifically, avoidance of eating before bed, elevating the head of the bed, avoiding large meals, minimizing fatty foods, minimal wine/beer/soda, and eating smaller meals spread out through the day.    Reflux medication will be started today, and I will see the patient back in 4 to 6 weeks for follow up.  I have instructed the patient that if the symptoms are not significantly improved, we may also need to refer for an upper endoscopy, as well as changing the reflux medication, or adding a secondary medication such as sucralfate.

## 2022-11-29 ENCOUNTER — TELEPHONE (OUTPATIENT)
Dept: OBGYN | Facility: CLINIC | Age: 22
End: 2022-11-29

## 2022-11-29 NOTE — TELEPHONE ENCOUNTER
Upcoming appt for colpcoscopy. Struggling for some time with BV. Debating removing her IUD while she has her colpcoscopy next week.     She is wondering if her IUD could be contributing to her chronic BV. Is tired of feeling like she is always on antibiotics and worried about long term usage of them.     Routing to on-call provider to advise if thinks it's an option to remove IUD during colp next week.     Patient also wondering if she can get treatment for BV as she feels symptoms now, or if she needs to come in for a wet prep.

## 2022-11-29 NOTE — TELEPHONE ENCOUNTER
I would recommend doing wet prep before treating, in general.  There are standing orders in her chart.  However, given the weather, if she'd like to just do treatment, I'm ok with that too.  Just need preferred pharmacy.    I would be able to remove her IUD with colposcopy if she desires.    Ruby Zepeda MD

## 2022-12-01 ENCOUNTER — OFFICE VISIT (OUTPATIENT)
Dept: OTOLARYNGOLOGY | Facility: CLINIC | Age: 22
End: 2022-12-01
Payer: COMMERCIAL

## 2022-12-01 VITALS
BODY MASS INDEX: 24.4 KG/M2 | DIASTOLIC BLOOD PRESSURE: 61 MMHG | SYSTOLIC BLOOD PRESSURE: 136 MMHG | WEIGHT: 161 LBS | HEIGHT: 68 IN | RESPIRATION RATE: 16 BRPM | OXYGEN SATURATION: 95 % | HEART RATE: 69 BPM

## 2022-12-01 DIAGNOSIS — J35.01 CHRONIC TONSILLITIS: Primary | ICD-10-CM

## 2022-12-01 DIAGNOSIS — K21.9 LPRD (LARYNGOPHARYNGEAL REFLUX DISEASE): ICD-10-CM

## 2022-12-01 PROCEDURE — 99204 OFFICE O/P NEW MOD 45 MIN: CPT | Performed by: OTOLARYNGOLOGY

## 2022-12-01 RX ORDER — OMEPRAZOLE 40 MG/1
40 CAPSULE, DELAYED RELEASE ORAL DAILY
Qty: 90 CAPSULE | Refills: 3 | Status: SHIPPED | OUTPATIENT
Start: 2022-12-01 | End: 2023-08-07

## 2022-12-01 ASSESSMENT — PAIN SCALES - GENERAL: PAINLEVEL: NO PAIN (0)

## 2022-12-01 NOTE — LETTER
12/1/2022         RE: Arlen William  [address]  [place] [address]        Dear Colleague,    Thank you for referring your patient, Arlen William, to the [facility]. Please see a copy of my visit note below.    History of Present Illness - Arlen William is a very pleasant 22 year old female here to see me for the first time due to recurrent tonsil infections.    She tells me that she has had issues with her throat and tonsils for about 10 years.  She gets recurrent tonsillitis, at least 3-4 times per year.  She also does get sinus infections and bronchitis.    Otherwise no ENT surgery. She also notes that she has reflux that is not treated    Past Medical History -   Patient Active Problem List   Diagnosis     Environmental allergies     Mild persistent asthma     Scoliosis     Leg length discrepancy     Torn ACL     Chronic rhinitis     Tinea versicolor     Dysmenorrhea     Other acne     Chest wall pain     Oropharyngeal dysphagia     Exercise-induced asthma     Nexplanon in place     Atypical squamous cells of undetermined significance (ASCUS) on Papanicolaou smear of cervix     Chronic pain of left knee     Chronic pain of right knee       Current Medications -   Current Outpatient Medications:      albuterol (PROAIR HFA/PROVENTIL HFA/VENTOLIN HFA) 108 (90 Base) MCG/ACT inhaler, Inhale 2 puffs into the lungs every 6 hours as needed for shortness of breath / dyspnea or wheezing, Disp: 18 g, Rfl: 0     Biotin 10 MG CAPS, , Disp: , Rfl:      Cholecalciferol (VITAMIN D) 1000 UNITS capsule, Take 1 capsule by mouth daily., Disp: , Rfl:      levonorgestrel (MIRENA) 20 MCG/24HR IUD, 1 each (20 mcg) by Intrauterine route once, Disp: , Rfl:      Multiple Vitamin (MULTIVITAMINS PO), Take 1 tablet by mouth daily., Disp: , Rfl:      triamcinolone (ARISTOCORT HP) 0.5 % external cream, Apply topically 2 times daily, Disp: 15 g, Rfl: 0    Allergies - No Known Allergies    Social  History -   Social History     Socioeconomic History     Marital status: Single   Occupational History     Comment: [employer] stand sometimes    Tobacco Use     Smoking status: Never     Smokeless tobacco: Never     Tobacco comments:     no smokers at home   Substance and Sexual Activity     Alcohol use: Yes     Comment: 0-2 per month      Drug use: No     Sexual activity: Not Currently     Partners: Male     Birth control/protection: Implant     Comment: Placed 04/2018       Family History -   Family History   Problem Relation Age of Onset     Diabetes Mother         gestational      GERD Mother      Gallbladder Disease Mother      Diabetes Maternal Grandmother      Cancer Maternal Grandmother         ovarian and uterine     Hypertension Father      Lipids Father      Hypertension Paternal Grandfather      Family History Negative No family hx of      Asthma No family hx of         Eosinophilic esophagitis.     C.A.D. No family hx of      Breast Cancer No family hx of      Cerebrovascular Disease No family hx of      Cancer - colorectal No family hx of      Prostate Cancer No family hx of      Alcohol/Drug No family hx of      Allergies No family hx of      Alzheimer Disease No family hx of      Anesthesia Reaction No family hx of      Arthritis No family hx of      Blood Disease No family hx of      Cardiovascular No family hx of      Circulatory No family hx of      Congenital Anomalies No family hx of      Connective Tissue Disorder No family hx of      Depression No family hx of      Endocrine Disease No family hx of      Eye Disorder No family hx of      Genetic Disorder No family hx of      Gastrointestinal Disease No family hx of      Genitourinary Problems No family hx of      Gynecology No family hx of      Musculoskeletal Disorder No family hx of      Anxiety Disorder No family hx of      Mental Illness No family hx of      Substance Abuse No family hx of      Colon Cancer No family hx of      Other  "Cancer No family hx of      Thyroid Disease No family hx of      Obesity No family hx of      Osteoporosis No family hx of      Unknown/Adopted No family hx of      Hyperlipidemia No family hx of      Coronary Artery Disease No family hx of      Other - See Comments No family hx of        Review of Systems - As per HPI and PMHx, otherwise 10+ system review of the head and neck, and general constitution is negative.    Physical Exam  /61   Pulse 69   Resp 16   Ht 1.727 m (5' 7.99\")   Wt 73 kg (161 lb)   SpO2 95%   BMI 24.49 kg/m      General - The patient is well nourished and well developed, and appears to have good nutritional status.  Alert and oriented to person and place, answers questions and cooperates with examination appropriately.   Head and Face - Normocephalic and atraumatic, with no gross asymmetry noted of the contour of the facial features.  The facial nerve is intact, with strong symmetric movements.  Voice and Breathing - The patient was breathing comfortably without the use of accessory muscles. There was no wheezing, stridor, or stertor.  The patients voice was clear and strong, and had appropriate pitch and quality.  Ears - The tympanic membranes are normal in appearance, bony landmarks are intact.  No retraction, perforation, or masses.  No fluid or purulence was seen in the external canal or the middle ear. No evidence of infection of the middle ear or external canal, cerumen was normal in appearance.  Eyes - Extraocular movements intact, and the pupils were reactive to light.  Sclera were not icteric or injected, conjunctiva were pink and moist.  Mouth - Examination of the oral cavity showed pink, healthy oral mucosa. No lesions or ulcerations noted.  The tongue was mobile and midline, and the dentition were in good condition.    Throat - The walls of the oropharynx were smooth, pink, moist, symmetric, and had no lesions or ulcerations.  The tonsillar pillars and soft palate were " symmetric.  The uvula was midline on elevation.    Neck - Normal midline excursion of the laryngotracheal complex during swallowing.  Full range of motion on passive movement.  Palpation of the occipital, submental, submandibular, internal jugular chain, and supraclavicular nodes did not demonstrate any abnormal lymph nodes or masses.  The carotid pulse was palpable bilaterally.  Palpation of the thyroid was soft and smooth, with no nodules or goiter appreciated.  The trachea was mobile and midline.  Nose - External contour is symmetric, no gross deflection or scars.  Nasal mucosa is pink and moist with no abnormal mucus.  The septum was midline and non-obstructive, turbinates of normal size and position.  No polyps, masses, or purulence noted on examination.      A/P - Arlen William is a 22 year old female  (J35.01) Chronic tonsillitis  (primary encounter diagnosis)  (K21.9) LPRD (laryngopharyngeal reflux disease)    For the recurrent infections, I have placed an open Immunology order in case she wants to get a work up.    For her throat symptoms and recurrent tonsillitis, we did discuss tonsillectomy and she will think about it.    However, her history of untreated reflux could be quite significant here.  I have counseled her that reflux can cause irritation of the throat, teeth problems, and increased precipitins towards tonsillitis.    The remainder of today's visit was spent discussing non-medical measures that can help in the management of acid reflux.  Specifically, avoidance of eating before bed, elevating the head of the bed, avoiding large meals, minimizing fatty foods, minimal wine/beer/soda, and eating smaller meals spread out through the day.    Reflux medication will be started today, and I will see the patient back in 4 to 6 weeks for follow up.  I have instructed the patient that if the symptoms are not significantly improved, we may also need to refer for an upper endoscopy, as well as changing  the reflux medication, or adding a secondary medication such as sucralfate.          Again, thank you for allowing me to participate in the care of your patient.        Sincerely,        Adama Llamas MD

## 2022-12-06 ENCOUNTER — OFFICE VISIT (OUTPATIENT)
Dept: OBGYN | Facility: CLINIC | Age: 22
End: 2022-12-06
Payer: COMMERCIAL

## 2022-12-06 VITALS
DIASTOLIC BLOOD PRESSURE: 70 MMHG | WEIGHT: 158 LBS | SYSTOLIC BLOOD PRESSURE: 137 MMHG | OXYGEN SATURATION: 95 % | HEART RATE: 68 BPM | BODY MASS INDEX: 24.03 KG/M2

## 2022-12-06 DIAGNOSIS — B37.31 YEAST INFECTION OF THE VAGINA: ICD-10-CM

## 2022-12-06 DIAGNOSIS — N89.8 VAGINAL DISCHARGE: ICD-10-CM

## 2022-12-06 DIAGNOSIS — Z30.011 ENCOUNTER FOR INITIAL PRESCRIPTION OF CONTRACEPTIVE PILLS: ICD-10-CM

## 2022-12-06 DIAGNOSIS — N76.0 BACTERIAL VAGINOSIS: ICD-10-CM

## 2022-12-06 DIAGNOSIS — Z30.012 EMERGENCY CONTRACEPTION: ICD-10-CM

## 2022-12-06 DIAGNOSIS — B96.89 BACTERIAL VAGINOSIS: ICD-10-CM

## 2022-12-06 DIAGNOSIS — Z30.432 ENCOUNTER FOR IUD REMOVAL: ICD-10-CM

## 2022-12-06 DIAGNOSIS — R87.610 ATYPICAL SQUAMOUS CELLS OF UNDETERMINED SIGNIFICANCE (ASCUS) ON PAPANICOLAOU SMEAR OF CERVIX: Primary | ICD-10-CM

## 2022-12-06 LAB
CLUE CELLS: PRESENT
HCG UR QL: NEGATIVE
TRICHOMONAS, WET PREP: ABNORMAL
WBC'S/HIGH POWER FIELD, WET PREP: ABNORMAL
YEAST, WET PREP: PRESENT

## 2022-12-06 PROCEDURE — 57456 ENDOCERV CURETTAGE W/SCOPE: CPT | Performed by: OBSTETRICS & GYNECOLOGY

## 2022-12-06 PROCEDURE — 88305 TISSUE EXAM BY PATHOLOGIST: CPT | Performed by: STUDENT IN AN ORGANIZED HEALTH CARE EDUCATION/TRAINING PROGRAM

## 2022-12-06 PROCEDURE — 58301 REMOVE INTRAUTERINE DEVICE: CPT | Mod: 51 | Performed by: OBSTETRICS & GYNECOLOGY

## 2022-12-06 PROCEDURE — 99213 OFFICE O/P EST LOW 20 MIN: CPT | Mod: 25 | Performed by: OBSTETRICS & GYNECOLOGY

## 2022-12-06 PROCEDURE — 88342 IMHCHEM/IMCYTCHM 1ST ANTB: CPT | Performed by: STUDENT IN AN ORGANIZED HEALTH CARE EDUCATION/TRAINING PROGRAM

## 2022-12-06 PROCEDURE — 81025 URINE PREGNANCY TEST: CPT | Performed by: OBSTETRICS & GYNECOLOGY

## 2022-12-06 PROCEDURE — 87210 SMEAR WET MOUNT SALINE/INK: CPT | Performed by: OBSTETRICS & GYNECOLOGY

## 2022-12-06 RX ORDER — METRONIDAZOLE 7.5 MG/G
1 GEL VAGINAL AT BEDTIME
Qty: 70 G | Refills: 0 | Status: SHIPPED | OUTPATIENT
Start: 2022-12-06 | End: 2022-12-11

## 2022-12-06 RX ORDER — FLUCONAZOLE 150 MG/1
150 TABLET ORAL ONCE
Qty: 1 TABLET | Refills: 0 | Status: SHIPPED | OUTPATIENT
Start: 2022-12-06 | End: 2022-12-06

## 2022-12-06 RX ORDER — NORGESTIMATE AND ETHINYL ESTRADIOL 0.25-0.035
1 KIT ORAL DAILY
Qty: 84 TABLET | Refills: 4 | Status: SHIPPED | OUTPATIENT
Start: 2022-12-06 | End: 2022-12-21

## 2022-12-06 NOTE — PROGRESS NOTES
Arlen William is a 22 year old female  who presents for initial colposcopy, referred by Nuria Estrada. Pap smear on 10/10/22 showed: ASCUS. The prior pap showed normal.     20 ASCUS Pap at 21 yo. Plan pap only in 1 year.   20 NIL Pap. Plan Pap in 1 year due 21.  10/10/22 ASCUS, 22 yr old. Plan [place] bef 1/10/23    Has IUD and would like it removed.  Feels she's had recurrent vaginitis, mostly BV, since insertion.  Particularly distressing for her.  Not sexually active at this time, so contraception isn't at the top of her list, but she's open to discussing options.    No LMP recorded. (Menstrual status: IUD).  UPT today is negative  Patient does not smoke  Type of contraception: IUD  Age at first sexual intercourse: 17  Number of sexual partners (lifetime): more than 6  Past GYN history: Chlamydia and Trichomonas  Prior cervical/vaginal disease: none.  Prior cervical treatment: no treatment.      PROCEDURE:      Before the procedure, it was ensured that the patient was educated regarding the nature of her findings to date, the implications, and what was to be done. She has been made aware of the role of HPV, the natural history of infection, ways to minimize her future risk, the effect of HPV on the cervix, and treatment options available should they be indicated. The details of the colposcopic procedure were reviewed. All questions were answered before proceeding, and informed consent was therefore obtained.      Speculum placed in vagina and excellent visualization of cervix acheived, wet prep obtained.  Cervix swabbed x 3 with acetic acid solution and colposcopy performed.    Following colposcopy, IUD removed without complication      FINDINGS:  Cervix: no visible lesions  Please refer to images section for details.  SCJ seen?: yes   ECC done?: Yes using endocervical curette and brush.  Silver nitrate applied for hemostasis.    Satisfactory examination?: yes      ASSESSMENT: Normal exam  without visible pathology.  PLAN:     1. Atypical squamous cells of undetermined significance (ASCUS) on Papanicolaou smear of cervix  specimens labelled and sent to Pathology and will base further treatment on Pathology findings  - HCG Qual, Urine ([id number])  - Colposcopy, with curettage  - SURGICAL PATHOLOGY EXAM    2. Vaginal discharge  - Wet preparation    3. Encounter for initial prescription of contraceptive pills  - norgestimate-ethinyl estradiol (ORTHO-CYCLEN) 0.25-35 MG-MCG tablet; Take 1 tablet by mouth daily  Dispense: 84 tablet; Refill: 4    4. Emergency contraception  - Ulipristal Acetate (YANG) 30 MG tablet; Take 1 tablet (30 mg) by mouth once for 1 dose  Dispense: 1 tablet; Refill: 4    5. Yeast infection of the vagina  - fluconazole (DIFLUCAN) 150 MG tablet; Take 1 tablet (150 mg) by mouth once for 1 dose  Dispense: 1 tablet; Refill: 0    6. Bacterial vaginosis  - metroNIDAZOLE (METROGEL) 0.75 % vaginal gel; Place 1 applicator (5 g) vaginally At Bedtime for 5 days  Dispense: 70 g; Refill: 0    7. Encounter for IUD removal  - REMOVE INTRAUTERINE DEVICE          Ruby Zepeda MD

## 2022-12-12 LAB
PATH REPORT.COMMENTS IMP SPEC: ABNORMAL
PATH REPORT.COMMENTS IMP SPEC: ABNORMAL
PATH REPORT.COMMENTS IMP SPEC: YES
PATH REPORT.FINAL DX SPEC: ABNORMAL
PATH REPORT.GROSS SPEC: ABNORMAL
PATH REPORT.MICROSCOPIC SPEC OTHER STN: ABNORMAL
PATH REPORT.RELEVANT HX SPEC: ABNORMAL
PHOTO IMAGE: ABNORMAL

## 2022-12-19 ENCOUNTER — PATIENT OUTREACH (OUTPATIENT)
Dept: OBGYN | Facility: CLINIC | Age: 22
End: 2022-12-19

## 2022-12-19 DIAGNOSIS — R87.610 ATYPICAL SQUAMOUS CELLS OF UNDETERMINED SIGNIFICANCE (ASCUS) ON PAPANICOLAOU SMEAR OF CERVIX: ICD-10-CM

## 2022-12-21 ENCOUNTER — OFFICE VISIT (OUTPATIENT)
Dept: FAMILY MEDICINE | Facility: CLINIC | Age: 22
End: 2022-12-21
Payer: COMMERCIAL

## 2022-12-21 VITALS
OXYGEN SATURATION: 99 % | BODY MASS INDEX: 24.25 KG/M2 | HEIGHT: 68 IN | RESPIRATION RATE: 16 BRPM | HEART RATE: 63 BPM | TEMPERATURE: 97.8 F | SYSTOLIC BLOOD PRESSURE: 113 MMHG | WEIGHT: 160 LBS | DIASTOLIC BLOOD PRESSURE: 80 MMHG

## 2022-12-21 DIAGNOSIS — J03.01 RECURRENT STREPTOCOCCAL TONSILLITIS: ICD-10-CM

## 2022-12-21 DIAGNOSIS — R87.610 ATYPICAL SQUAMOUS CELLS OF UNDETERMINED SIGNIFICANCE (ASCUS) ON PAPANICOLAOU SMEAR OF CERVIX: ICD-10-CM

## 2022-12-21 DIAGNOSIS — Z76.89 ENCOUNTER TO ESTABLISH CARE: ICD-10-CM

## 2022-12-21 DIAGNOSIS — J45.990 EXERCISE-INDUCED ASTHMA: Primary | ICD-10-CM

## 2022-12-21 PROCEDURE — 99214 OFFICE O/P EST MOD 30 MIN: CPT | Performed by: FAMILY MEDICINE

## 2022-12-21 NOTE — PROGRESS NOTES
"  1. Exercise-induced asthma  This is a 23 yo female with exercise-induced asthma - uses rare inhaler.  Works as medic in .  Able to sustain high levels of exercise.      2. Atypical squamous cells of undetermined significance (ASCUS) on Papanicolaou smear of cervix  H/o abnormal pap smear - had recent colposcopy - continue to monitor    3. Recurrent streptococcal tonsillitis  Recent recurrent strep - has had evaluation for tonsillectomy -     4. Encounter to establish care  This is a 23 yo female here to establish care - issues reviewed as above      Subjective   Hipolito is a 22 year old accompanied by her self, presenting for the following health issues:  Establish Care      Needing to transition from childhood doctor - ANIL [place]   Health has been a \"journey\"  Has an established OB/Gyn at [place] - problems with birth control, BV, STI from partner   2 abnormal pap smears - abnl, normal, then colposcopy  Removed IUD -     Most recently treated for yeast, BV  Tested during recent colp    Had strep 3 times in a month - referred to ENT   Always a little cough - some mucus  Gastric reflux (always has phlegm in throat)  Wants to avoid tonsillectomy -     Basic training - early 2019 -   [employer] -    - 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  She exercises with enough effort to increase her heart rate 5 days per week.   She is taking medications regularly.             Review of Systems   Constitutional: Negative for chills.   HENT: Negative for " "congestion and sore throat.    Eyes: Negative for visual disturbance.   Respiratory: Negative for cough and shortness of breath.    Cardiovascular: Negative for chest pain and peripheral edema.   Gastrointestinal: Negative for abdominal pain.   Endocrine: Negative for polydipsia and polyuria.   Allergic/Immunologic: Negative.    All other systems reviewed and are negative.           Objective    /80 (BP Location: Left arm, Patient Position: Sitting, Cuff Size: Adult Regular)   Pulse 63   Temp 97.8  F (36.6  C) (Temporal)   Resp 16   Ht 1.727 m (5' 8\")   Wt 72.6 kg (160 lb)   SpO2 99%   BMI 24.33 kg/m    Body mass index is 24.33 kg/m .  Physical Exam  Vitals reviewed.   Constitutional:       General: She is not in acute distress.     Appearance: Normal appearance.   HENT:      Head: Normocephalic.      Right Ear: Tympanic membrane, ear canal and external ear normal.      Left Ear: Tympanic membrane, ear canal and external ear normal.      Nose: Nose normal.      Mouth/Throat:      Mouth: Mucous membranes are moist.      Pharynx: No posterior oropharyngeal erythema.   Eyes:      Extraocular Movements: Extraocular movements intact.      Conjunctiva/sclera: Conjunctivae normal.      Pupils: Pupils are equal, round, and reactive to light.   Cardiovascular:      Rate and Rhythm: Normal rate and regular rhythm.      Pulses: Normal pulses.      Heart sounds: Normal heart sounds. No murmur heard.  Pulmonary:      Effort: Pulmonary effort is normal.      Breath sounds: Normal breath sounds.   Abdominal:      Palpations: Abdomen is soft. There is no mass.      Tenderness: There is no abdominal tenderness. There is no guarding or rebound.   Musculoskeletal:         General: No deformity. Normal range of motion.      Cervical back: Normal range of motion and neck supple.   Lymphadenopathy:      Cervical: No cervical adenopathy.   Skin:     General: Skin is warm and dry.   Neurological:      General: No focal deficit " present.      Mental Status: She is alert.   Psychiatric:         Mood and Affect: Mood normal.         Behavior: Behavior normal.            No results found for any visits on 12/21/22.

## 2023-01-02 ASSESSMENT — ENCOUNTER SYMPTOMS
POLYDIPSIA: 0
COUGH: 0
SORE THROAT: 0
CHILLS: 0
SHORTNESS OF BREATH: 0
ALLERGIC/IMMUNOLOGIC NEGATIVE: 1
ABDOMINAL PAIN: 0

## 2023-01-20 ENCOUNTER — PRE VISIT (OUTPATIENT)
Dept: OTOLARYNGOLOGY | Facility: CLINIC | Age: 23
End: 2023-01-20

## 2023-02-22 ENCOUNTER — NURSE TRIAGE (OUTPATIENT)
Dept: NURSING | Facility: CLINIC | Age: 23
End: 2023-02-22
Payer: COMMERCIAL

## 2023-02-22 NOTE — TELEPHONE ENCOUNTER
Contacted patient and relayed message below from Dr Samuel.  Patient has had a history of yeast, BV and trich.  Needs definitive testing.  Patient will go to [facility] today.    No further action needed    Laverne Batista RN  [facility]

## 2023-02-22 NOTE — TELEPHONE ENCOUNTER
RN will route this encounter to  to review and advise.          Ofelia Rojas RN  [facility]

## 2023-02-22 NOTE — TELEPHONE ENCOUNTER
Not all of these symptoms are consistent with BV - it would be most useful for her to be seen.  Urgent care would be okay.

## 2023-02-22 NOTE — TELEPHONE ENCOUNTER
Nurse Triage SBAR    Is this a 2nd Level Triage? YES, LICENSED PRACTITIONER REVIEW IS REQUIRED    Situation: patient asking for wet prep testing order.    Background: Patient has issues with recurrent bacterial vaginosis and is having a flare. Symptoms had been dormant but has returned and increased in the last few days.  Last episode was treated on December 6.   Today current symptoms are extreme itching, unusual discharge, prolonged spotting, swelling of the labia, and random vaginal canal cramping. Itching is rated 7/10. The discharge is pinkish, thicker than regular spotting, and an odor that is different odor but not foul. Menstrual cycle was the 15-20 and patient has been spotting the last two days which she feels is due to the bacterial vaginosis.  Patient has been treating with boric acid suppository but has had no relief or improvement. Symptoms are consistent with when she has had bacterial vaginosis in the past.       Assessment: is an order for wet prep appropriate or should patient be seen in UC    Protocol Recommended Disposition:   See in Office Today or Tomorrow    Recommendation: consideration of testing or other disposition.      Routed to provider    Does the patient meet one of the following criteria for ADS visit consideration? 16+ years old, with an [facility] PCP     TIP  Providers, please consider if this condition is appropriate for management at one of our Acute and Diagnostic Services sites.     If patient is a good candidate, please use dotphrase <dot>triageresponse and select Refer to ADS to document.    Patient has issues with recurrent bacterial vaginosis and is having a flare. Symptoms had been dormant but has returned and increased in the last few days.  Last episode was treated on December 6.   Today current symptoms are extreme itching, unusual discharge, prolonged spotting, swelling of the labia, and random vaginal canal cramping. Itching is rated 7/10. The discharge is pinkish,  thicker than regular spotting, and an odor that is different odor but not foul. Menstrual cycle was the 15-20 and patient has been spotting the last two days which she feels is due to the bacterial vaginosis.  Patient has been treating with boric acid suppository but has had no relief or improvement. Symptoms are consistent with when she has had bacterial vaginosis in the past.     Protocol recommends see in office today or tomorrow  Care advice given.   Patient lives in [place] and is requesting a wet prep to confirm that it is bacterial vaginosis.  Call back to patient at [phone]  Agnes Sequeira RN   02/22/23 8:04 AM  [facility] Nurse Advisor          Reason for Disposition    MODERATE-SEVERE itching (i.e., interferes with school, work, or sleep)    Additional Information    Negative: Sounds like a life-threatening emergency to the triager    Negative: Followed a genital area injury (e.g., vagina, vulva)    Negative: Vaginal bleeding is main symptom    Negative: Vaginal discharge is main symptom    Negative: Pain or burning with passing urine (urination) is main symptom    Negative: Menstrual cramps is main symptom    Negative: Abdomen pain is main symptom    Negative: Pubic lice suspected    Negative: Itching or rash of external female genital area (vulva)    Negative: Labor suspected    Negative: Patient sounds very sick or weak to the triager    Negative: SEVERE pain and not improved 2 hours after pain medicine    Negative: Genital area looks infected (e.g., draining sore, spreading redness) and fever    Negative: Something is hanging out of the vagina and can't easily be pushed back inside    Negative: MILD-MODERATE pain and present > 24 hours  (Exception: Chronic pain.)    Negative: Genital area looks infected (e.g., draining sore, spreading redness)    Negative: Rash with painful tiny water blisters    Protocols used: VAGINAL SYMPTOMS-A-[place]

## 2023-02-23 ENCOUNTER — TELEPHONE (OUTPATIENT)
Dept: OBGYN | Facility: CLINIC | Age: 23
End: 2023-02-23
Payer: COMMERCIAL

## 2023-02-23 ENCOUNTER — E-VISIT (OUTPATIENT)
Dept: OBGYN | Facility: CLINIC | Age: 23
End: 2023-02-23
Payer: COMMERCIAL

## 2023-02-23 DIAGNOSIS — N89.8 VAGINAL DISCHARGE: Primary | ICD-10-CM

## 2023-02-23 DIAGNOSIS — B96.89 BACTERIAL VAGINOSIS: Primary | ICD-10-CM

## 2023-02-23 DIAGNOSIS — N76.0 BACTERIAL VAGINOSIS: Primary | ICD-10-CM

## 2023-02-23 PROCEDURE — 99421 OL DIG E/M SVC 5-10 MIN: CPT | Performed by: OBSTETRICS & GYNECOLOGY

## 2023-02-23 NOTE — TELEPHONE ENCOUNTER
Patient calling requesting lab order for BV (has history of recurrent BV, most recently saw SK). Informed patient I could check with an RN to see if a lab only order is possible or if patient needs to have visit with OB.     Upon review, appears that patient called different triage line yesterday requesting BV order, told to go to  but no documentation of that yet.     Routing to RD triage pod B for possible triage/determination of it patient can get lab only order or if should be seen by provider for in clinic/phone visit to discuss symptoms.       Keyanna  She/her/hers  [facility] Scheduler

## 2023-02-24 ENCOUNTER — LAB (OUTPATIENT)
Dept: LAB | Facility: CLINIC | Age: 23
End: 2023-02-24
Payer: COMMERCIAL

## 2023-02-24 DIAGNOSIS — N89.8 VAGINAL ITCHING: ICD-10-CM

## 2023-02-24 PROCEDURE — 87591 N.GONORRHOEAE DNA AMP PROB: CPT | Performed by: OBSTETRICS & GYNECOLOGY

## 2023-02-24 PROCEDURE — 87491 CHLMYD TRACH DNA AMP PROBE: CPT | Performed by: OBSTETRICS & GYNECOLOGY

## 2023-02-24 PROCEDURE — 87210 SMEAR WET MOUNT SALINE/INK: CPT

## 2023-02-25 LAB
C TRACH DNA SPEC QL NAA+PROBE: NEGATIVE
N GONORRHOEA DNA SPEC QL NAA+PROBE: NEGATIVE

## 2023-04-12 ENCOUNTER — MYC MEDICAL ADVICE (OUTPATIENT)
Dept: FAMILY MEDICINE | Facility: CLINIC | Age: 23
End: 2023-04-12
Payer: COMMERCIAL

## 2023-04-29 ENCOUNTER — HEALTH MAINTENANCE LETTER (OUTPATIENT)
Age: 23
End: 2023-04-29

## 2023-06-06 ENCOUNTER — MYC MEDICAL ADVICE (OUTPATIENT)
Dept: OBGYN | Facility: CLINIC | Age: 23
End: 2023-06-06

## 2023-06-06 ENCOUNTER — LAB (OUTPATIENT)
Dept: LAB | Facility: CLINIC | Age: 23
End: 2023-06-06
Payer: COMMERCIAL

## 2023-06-06 DIAGNOSIS — B96.89 BV (BACTERIAL VAGINOSIS): Primary | ICD-10-CM

## 2023-06-06 DIAGNOSIS — N76.0 BV (BACTERIAL VAGINOSIS): Primary | ICD-10-CM

## 2023-06-06 DIAGNOSIS — N89.8 VAGINAL ITCHING: ICD-10-CM

## 2023-06-06 PROCEDURE — 87210 SMEAR WET MOUNT SALINE/INK: CPT

## 2023-06-06 RX ORDER — METRONIDAZOLE 7.5 MG/G
1 GEL VAGINAL AT BEDTIME
Qty: 70 G | Refills: 0 | Status: SHIPPED | OUTPATIENT
Start: 2023-06-06 | End: 2023-06-11

## 2023-06-07 RX ORDER — METRONIDAZOLE 500 MG/1
500 TABLET ORAL 2 TIMES DAILY
Qty: 14 TABLET | Refills: 0 | Status: SHIPPED | OUTPATIENT
Start: 2023-06-07 | End: 2023-06-14

## 2023-06-07 NOTE — TELEPHONE ENCOUNTER
6/6/23 Lab result note  Your wet prep showed BV again.  Looks like we've used Metrogel in the past, so I'll send a prescription for that again.     If you'd ever like to discuss options for trying to suppress BV, please make an appointment.     Please contact the clinic if you have any questions.     12/6/22 [place]: ECC, KATINA 1. Plan 1 yr Dx pap cytology    Routing pt [place] message to provider to advise.  Maribeth Ramirez RN on 6/7/2023 at 6:06 AM

## 2023-06-16 ENCOUNTER — OFFICE VISIT (OUTPATIENT)
Dept: OBGYN | Facility: CLINIC | Age: 23
End: 2023-06-16
Payer: COMMERCIAL

## 2023-06-16 VITALS
SYSTOLIC BLOOD PRESSURE: 131 MMHG | BODY MASS INDEX: 24.18 KG/M2 | OXYGEN SATURATION: 100 % | DIASTOLIC BLOOD PRESSURE: 90 MMHG | WEIGHT: 159 LBS | HEART RATE: 86 BPM

## 2023-06-16 DIAGNOSIS — N89.8 VAGINAL IRRITATION: Primary | ICD-10-CM

## 2023-06-16 DIAGNOSIS — B96.89 BACTERIAL VAGINOSIS: ICD-10-CM

## 2023-06-16 DIAGNOSIS — Z20.828 EXPOSURE TO HERPES SIMPLEX VIRUS (HSV): ICD-10-CM

## 2023-06-16 DIAGNOSIS — Z30.011 ENCOUNTER FOR INITIAL PRESCRIPTION OF CONTRACEPTIVE PILLS: ICD-10-CM

## 2023-06-16 DIAGNOSIS — N76.0 BACTERIAL VAGINOSIS: ICD-10-CM

## 2023-06-16 DIAGNOSIS — K59.00 CONSTIPATION, UNSPECIFIED CONSTIPATION TYPE: ICD-10-CM

## 2023-06-16 PROCEDURE — 87210 SMEAR WET MOUNT SALINE/INK: CPT | Performed by: OBSTETRICS & GYNECOLOGY

## 2023-06-16 PROCEDURE — 99214 OFFICE O/P EST MOD 30 MIN: CPT | Performed by: OBSTETRICS & GYNECOLOGY

## 2023-06-16 RX ORDER — NORGESTIMATE AND ETHINYL ESTRADIOL 0.25-0.035
1 KIT ORAL DAILY
Qty: 84 TABLET | Refills: 4 | Status: SHIPPED | OUTPATIENT
Start: 2023-06-16 | End: 2024-02-15

## 2023-06-16 RX ORDER — DOCUSATE SODIUM 100 MG/1
100 CAPSULE, LIQUID FILLED ORAL 2 TIMES DAILY PRN
Qty: 180 CAPSULE | Refills: 11 | Status: SHIPPED | OUTPATIENT
Start: 2023-06-16 | End: 2023-08-07

## 2023-06-16 RX ORDER — METRONIDAZOLE 7.5 MG/G
GEL VAGINAL
Qty: 70 G | Refills: 11 | Status: SHIPPED | OUTPATIENT
Start: 2023-06-16 | End: 2024-02-15

## 2023-06-16 NOTE — PROGRESS NOTES
GYN Problem Visit                                                 CC:  Recurrent BV, HSV exposure    HPI:  Arlen William is a 22 year old female who presents to clinic today complaining of recurrent BV.    Gets intermittent BV, but treatment typically works.  Finished treatment recently.  Maybe has mild irritation, but is on very end of period.  LMP     Gets cramping of vagina, not like menstrual cramping.  Less than daily, but a few times per week.  No significant pain with intercourse.  Not sure if this could be BV related.    Partner has hx of HSV.  Takes daily suppression.  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  Seemingly stress related.  Wants to discuss risks.         Trichomonas Yeast Clue cells WBCs/high power field   Latest Ref Rng Absent  Absent  Absent  None    2022  9:36 AM Absent  Present !  Absent  1+ !    2022  3:22 PM Absent  Absent  Present !  None    10/10/2022  7:50 AM Absent  Absent  Present !  1+ !    2022  3:44 PM Absent  Present !  Present !  2+ !    2023  11:44 AM Absent  Absent  Absent  None    2023  8:34 AM Absent  Absent  Present !  1+ !       Legend:  ! Abnormal      ROS:  Per HPI    EXAM:  Blood pressure (!) 131/90, pulse 86, weight 72.1 kg (159 lb), last menstrual period 2023, SpO2 100 %, not currently breastfeeding.   BMI= Body mass index is 24.18 kg/m .  General - pleasant female in no acute distress.  Abdomen - soft, nontender, nondistended, no hepatosplenomegaly.  Pelvic - external genitalia: normal adult female without lesions or abnormalities; BUS: within normal limits; Vagina: well rugated, no discharge, no lesions;  No tenderness or tension of pelvic floor.  Noted significant stool burden in rectum on bimanual exam  Rectovaginal - deferred.  Musculoskeletal - no gross deformities.  Neurological - normal strength, sensation, and mental status.      Assessment:    Arlen William is a 22 year old  who presents today to  discuss recurrent BV, HSV exposure.    Plan:  1. Vaginal irritation  Will check wet prep today and treat if needed  - Wet preparation    2. Bacterial vaginosis  Plan suppression for BV.  Hoping that decreases BV episodes may improve pelvic discomfort  Also discussed hygiene changes that can help, as well as condom use  - metroNIDAZOLE (METROGEL) 0.75 % vaginal gel; Apply vaginally twice weekly for 4-6 months.  Dispense: 70 g; Refill: 11    3. Constipation, unspecified constipation type  Patient endorses constipation, which was also noted on exam.  Recommended addressing this, as it may help with her pelvic discomfort  - docusate sodium (COLACE) 100 MG capsule; Take 1 capsule (100 mg) by mouth 2 times daily as needed for constipation  Dispense: 180 capsule; Refill: 11    4. Encounter for initial prescription of contraceptive pills  - norgestimate-ethinyl estradiol (ORTHO-CYCLEN) 0.25-35 MG-MCG tablet; Take 1 tablet by mouth daily  Dispense: 84 tablet; Refill: 4    5. Exposure to herpes simplex virus (HSV)  Discussed HSV and how to reduce risk of transmission.    RTC in Nov/Dec for repeat pap.      Ruby Zepeda MD

## 2023-07-18 ENCOUNTER — VIRTUAL VISIT (OUTPATIENT)
Dept: FAMILY MEDICINE | Facility: CLINIC | Age: 23
End: 2023-07-18
Payer: COMMERCIAL

## 2023-07-18 DIAGNOSIS — K59.09 CHRONIC CONSTIPATION: ICD-10-CM

## 2023-07-18 DIAGNOSIS — R53.83 FATIGUE, UNSPECIFIED TYPE: Primary | ICD-10-CM

## 2023-07-18 PROCEDURE — 99214 OFFICE O/P EST MOD 30 MIN: CPT | Mod: VID | Performed by: FAMILY MEDICINE

## 2023-07-18 ASSESSMENT — ASTHMA QUESTIONNAIRES: ACT_TOTALSCORE: 25

## 2023-07-18 NOTE — PROGRESS NOTES
"Hipolito is a 22 year old who is being evaluated via a billable video visit.      How would you like to obtain your AVS? MyChart  If the video visit is dropped, the invitation should be resent by: Text to cell phone: [phone]  Will anyone else be joining your video visit? No        1. Fatigue, unspecified type  21 yo complains of fatigue - general lack of energy beginning last September.  While some of this may be psychological, she is working with a therapist - will check labs to consider metabolic issues.    - TSH; Future  - T4, free; Future  - Comprehensive metabolic panel (BMP + Alb, Alk Phos, ALT, AST, Total. Bili, TP); Future  - CBC with Platelets & Differential; Future  - ESR: Erythrocyte sedimentation rate; Future  - Lyme Disease Total Abs Bld with Reflex to Confirm CLIA; Future    2. Chronic constipation  Patient complains of chronic constipation although later tells me she alternates between constipation/diarrhea.  IBS may be considered - may be part of her overall fatigue.  May need to consider GI issues if metabolic workup unrevealing - ?food allergy/sensitivity          Subjective   Hipolito is a 22 year old, presenting for the following health issues:  No chief complaint on file.        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   Guard - once a month and  Weeks in the summer -     just started taking birth control - 4-5 months ago   Has been prescribed - something for constipation - only taken it once -     In September - was working 3 positions at one time -   Exercise - big part of life - now with continuous exhaustion, hasn't been to gum   Bed time 9-10 pm ; falls asleep fairly quickly -   Doesn't wake up at night - \"I'm out\" -   Sets an alarm - " "multiple  - gets up at 6-6:30  Would like to get to gym in the morning - but doesn't happen     Doesn't have naps during day - would sleep x 3 hours   On weekends, tries to get to the gym     Doesn't snore that she's aware   Feels like she has the drive to do it all, but not the energy     On birth control - was on IUD/Nexplanon  Now OCP   gets a little bit of bleeding - not super heavy -     Was put on chronic BV treatment -   No other chronic illnesses -     Family -   Mom - type II DM  Dad - BP  No thyroid issues    Constipation issues has been worse since about same time   Either diarrhea or constipated -   Not common to have \"normal\" BM            History of Present Illness       Reason for visit:  Exhaustion  Symptom onset:  More than a month  Symptoms include:  Cannot get up in the morning  Symptom intensity:  Mild  Symptom progression:  Staying the same  Had these symptoms before:  Yes  Has tried/received treatment for these symptoms:  No    She eats 2-3 servings of fruits and vegetables daily.She consumes 1 sweetened beverage(s) daily.She exercises with enough effort to increase her heart rate 20 to 29 minutes per day.  She exercises with enough effort to increase her heart rate 3 or less days per week.   She is taking medications regularly.               Review of Systems   Constitutional: Positive for fatigue. Negative for chills and fever.   HENT: Negative.    Eyes: Negative for visual disturbance.   Respiratory: Negative for cough and shortness of breath.    Cardiovascular: Negative for chest pain and peripheral edema.   Gastrointestinal: Positive for constipation (stools are \"never normal\").   Endocrine: Negative for polydipsia and polyuria.   Genitourinary: Negative.    Musculoskeletal: Positive for myalgias.   Skin: Negative.    Allergic/Immunologic: Negative.    Neurological: Negative.    Psychiatric/Behavioral: Positive for mood changes and sleep disturbance.   All other systems reviewed and are " negative.           Objective           Vitals:  No vitals were obtained today due to virtual visit.    Physical Exam  Vitals reviewed.        GENERAL: Healthy, alert and no distress  EYES: Eyes grossly normal to inspection.  No discharge or erythema, or obvious scleral/conjunctival abnormalities.  RESP: No audible wheeze, cough, or visible cyanosis.  No visible retractions or increased work of breathing.    SKIN: Visible skin clear. No significant rash, abnormal pigmentation or lesions.  NEURO: Cranial nerves grossly intact.  Mentation and speech appropriate for age.  PSYCH: Mentation appears normal, affect normal/bright, judgement and insight intact, normal speech and appearance well-groomed.    No results found for any visits on 07/18/23.            Video-Visit Details    Type of service:  Video Visit     Originating Location (pt. Location): Home  Distant Location (provider location):  On-site  Platform used for Video Visit: Daisy

## 2023-07-19 ENCOUNTER — LAB (OUTPATIENT)
Dept: LAB | Facility: CLINIC | Age: 23
End: 2023-07-19
Payer: COMMERCIAL

## 2023-07-19 DIAGNOSIS — R53.83 FATIGUE, UNSPECIFIED TYPE: ICD-10-CM

## 2023-07-19 LAB
BASOPHILS # BLD AUTO: 0.1 10E3/UL (ref 0–0.2)
BASOPHILS NFR BLD AUTO: 1 %
EOSINOPHIL # BLD AUTO: 0.1 10E3/UL (ref 0–0.7)
EOSINOPHIL NFR BLD AUTO: 1 %
ERYTHROCYTE [DISTWIDTH] IN BLOOD BY AUTOMATED COUNT: 12.1 % (ref 10–15)
ERYTHROCYTE [SEDIMENTATION RATE] IN BLOOD BY WESTERGREN METHOD: 6 MM/HR (ref 0–20)
HCT VFR BLD AUTO: 42.4 % (ref 35–47)
HGB BLD-MCNC: 13.6 G/DL (ref 11.7–15.7)
IMM GRANULOCYTES # BLD: 0 10E3/UL
IMM GRANULOCYTES NFR BLD: 0 %
LYMPHOCYTES # BLD AUTO: 1.9 10E3/UL (ref 0.8–5.3)
LYMPHOCYTES NFR BLD AUTO: 27 %
MCH RBC QN AUTO: 28.6 PG (ref 26.5–33)
MCHC RBC AUTO-ENTMCNC: 32.1 G/DL (ref 31.5–36.5)
MCV RBC AUTO: 89 FL (ref 78–100)
MONOCYTES # BLD AUTO: 0.5 10E3/UL (ref 0–1.3)
MONOCYTES NFR BLD AUTO: 8 %
NEUTROPHILS # BLD AUTO: 4.5 10E3/UL (ref 1.6–8.3)
NEUTROPHILS NFR BLD AUTO: 64 %
PLATELET # BLD AUTO: 365 10E3/UL (ref 150–450)
RBC # BLD AUTO: 4.75 10E6/UL (ref 3.8–5.2)
WBC # BLD AUTO: 7 10E3/UL (ref 4–11)

## 2023-07-19 PROCEDURE — 86618 LYME DISEASE ANTIBODY: CPT

## 2023-07-19 PROCEDURE — 85025 COMPLETE CBC W/AUTO DIFF WBC: CPT

## 2023-07-19 PROCEDURE — 84443 ASSAY THYROID STIM HORMONE: CPT

## 2023-07-19 PROCEDURE — 36415 COLL VENOUS BLD VENIPUNCTURE: CPT

## 2023-07-19 PROCEDURE — 84439 ASSAY OF FREE THYROXINE: CPT

## 2023-07-19 PROCEDURE — 85652 RBC SED RATE AUTOMATED: CPT

## 2023-07-19 PROCEDURE — 80053 COMPREHEN METABOLIC PANEL: CPT

## 2023-07-20 LAB
ALBUMIN SERPL BCG-MCNC: 4.6 G/DL (ref 3.5–5.2)
ALP SERPL-CCNC: 63 U/L (ref 35–104)
ALT SERPL W P-5'-P-CCNC: 22 U/L (ref 0–50)
ANION GAP SERPL CALCULATED.3IONS-SCNC: 4 MMOL/L (ref 7–15)
AST SERPL W P-5'-P-CCNC: 24 U/L (ref 0–45)
B BURGDOR IGG+IGM SER QL: 0.19
BILIRUB SERPL-MCNC: 0.2 MG/DL
BUN SERPL-MCNC: 12.4 MG/DL (ref 6–20)
CALCIUM SERPL-MCNC: 9.7 MG/DL (ref 8.6–10)
CHLORIDE SERPL-SCNC: 108 MMOL/L (ref 98–107)
CREAT SERPL-MCNC: 0.77 MG/DL (ref 0.51–0.95)
DEPRECATED HCO3 PLAS-SCNC: 27 MMOL/L (ref 22–29)
GFR SERPL CREATININE-BSD FRML MDRD: >90 ML/MIN/1.73M2
GLUCOSE SERPL-MCNC: 85 MG/DL (ref 70–99)
POTASSIUM SERPL-SCNC: 4.7 MMOL/L (ref 3.4–5.3)
PROT SERPL-MCNC: 7.5 G/DL (ref 6.4–8.3)
SODIUM SERPL-SCNC: 139 MMOL/L (ref 136–145)
T4 FREE SERPL-MCNC: 1.26 NG/DL (ref 0.9–1.7)
TSH SERPL DL<=0.005 MIU/L-ACNC: 2.37 UIU/ML (ref 0.3–4.2)

## 2023-07-21 DIAGNOSIS — K59.09 CHRONIC CONSTIPATION: ICD-10-CM

## 2023-07-21 DIAGNOSIS — R53.83 FATIGUE, UNSPECIFIED TYPE: Primary | ICD-10-CM

## 2023-07-23 ASSESSMENT — ENCOUNTER SYMPTOMS
ALLERGIC/IMMUNOLOGIC NEGATIVE: 1
FATIGUE: 1
MYALGIAS: 1
SHORTNESS OF BREATH: 0
NEUROLOGICAL NEGATIVE: 1
CONSTIPATION: 1
COUGH: 0
CHILLS: 0
SLEEP DISTURBANCE: 1
POLYDIPSIA: 0
FEVER: 0

## 2023-08-03 ENCOUNTER — MYC MEDICAL ADVICE (OUTPATIENT)
Dept: OBGYN | Facility: CLINIC | Age: 23
End: 2023-08-03
Payer: COMMERCIAL

## 2023-08-03 DIAGNOSIS — B37.31 CANDIDIASIS OF VAGINA: ICD-10-CM

## 2023-08-03 DIAGNOSIS — N89.8 VAGINAL ITCHING: Primary | ICD-10-CM

## 2023-08-03 NOTE — TELEPHONE ENCOUNTER
"Office visit on 6/16/23 - recurrent BV  \"Plan suppression for BV.  Hoping that decreases BV episodes may improve pelvic discomfort  Also discussed hygiene changes that can help, as well as condom use  - metroNIDAZOLE (METROGEL) 0.75 % vaginal gel; Apply vaginally twice weekly for 4-6 months.  Dispense: 70 g; Refill: 11\"  "

## 2023-08-04 ENCOUNTER — LAB (OUTPATIENT)
Dept: LAB | Facility: CLINIC | Age: 23
End: 2023-08-04
Payer: COMMERCIAL

## 2023-08-04 DIAGNOSIS — N89.8 VAGINAL ITCHING: ICD-10-CM

## 2023-08-04 PROCEDURE — 87210 SMEAR WET MOUNT SALINE/INK: CPT

## 2023-08-04 RX ORDER — FLUCONAZOLE 150 MG/1
150 TABLET ORAL ONCE
Qty: 1 TABLET | Refills: 0 | Status: SHIPPED | OUTPATIENT
Start: 2023-08-04 | End: 2023-08-05

## 2023-08-05 ENCOUNTER — NURSE TRIAGE (OUTPATIENT)
Dept: NURSING | Facility: CLINIC | Age: 23
End: 2023-08-05
Payer: COMMERCIAL

## 2023-08-05 DIAGNOSIS — B37.31 CANDIDIASIS OF VAGINA: ICD-10-CM

## 2023-08-05 RX ORDER — FLUCONAZOLE 150 MG/1
150 TABLET ORAL ONCE
Qty: 1 TABLET | Refills: 0 | Status: SHIPPED | OUTPATIENT
Start: 2023-08-05 | End: 2023-08-05

## 2023-08-05 NOTE — TELEPHONE ENCOUNTER
Move diflucan to different pharmacy, patients pharmacy is closed.    Myah Patel RN on 8/5/2023 at 9:02 AM    Reason for Disposition    Caller with prescription and triager answers question    Protocols used: Medication Refill and Renewal Call-A-

## 2023-08-07 ENCOUNTER — VIRTUAL VISIT (OUTPATIENT)
Dept: GASTROENTEROLOGY | Facility: CLINIC | Age: 23
End: 2023-08-07
Attending: FAMILY MEDICINE
Payer: COMMERCIAL

## 2023-08-07 DIAGNOSIS — R53.83 FATIGUE, UNSPECIFIED TYPE: ICD-10-CM

## 2023-08-07 DIAGNOSIS — K21.9 GASTROESOPHAGEAL REFLUX DISEASE, UNSPECIFIED WHETHER ESOPHAGITIS PRESENT: ICD-10-CM

## 2023-08-07 DIAGNOSIS — K58.2 IRRITABLE BOWEL SYNDROME WITH BOTH CONSTIPATION AND DIARRHEA: Primary | ICD-10-CM

## 2023-08-07 DIAGNOSIS — R19.7 DIARRHEA, UNSPECIFIED TYPE: ICD-10-CM

## 2023-08-07 DIAGNOSIS — K59.09 CHRONIC CONSTIPATION: ICD-10-CM

## 2023-08-07 DIAGNOSIS — K21.9 LPRD (LARYNGOPHARYNGEAL REFLUX DISEASE): ICD-10-CM

## 2023-08-07 PROCEDURE — 99205 OFFICE O/P NEW HI 60 MIN: CPT | Mod: VID | Performed by: PHYSICIAN ASSISTANT

## 2023-08-07 RX ORDER — OMEPRAZOLE 40 MG/1
40 CAPSULE, DELAYED RELEASE ORAL EVERY MORNING
Qty: 90 CAPSULE | Refills: 0
Start: 2023-08-07 | End: 2023-11-14

## 2023-08-07 NOTE — NURSING NOTE
Is the patient currently in the state of [place]? YES    Visit mode:VIDEO    If the visit is dropped, the patient can be reconnected by: VIDEO VISIT: Send to e-mail at: [email or web address]    Will anyone else be joining the visit? NO      How would you like to obtain your AVS? MyChart    Are changes needed to the allergy or medication list? YES: Patient took 1 Doxycyclen a couple of days ago.      Reason for visit: Consult    Maria Luz POTTER

## 2023-08-07 NOTE — PROGRESS NOTES
GI NEW PATIENT VISIT    CC/REFERRING MD:    ASHLEE RIOS MD     REASON FOR CONSULTATION:   Referred by Ashlee Rios MD for Consult      HISTORY OF PRESENT ILLNESS:    Arlen William is 23 year old female who presents for GI CONSULT     She reports that her symptoms have been going on for a few years.  She recalls irregular bowel habits happening when she joined the  in 2018 which she relates to the type of meal she was eating.    Her symptoms exacerbated in 2021.  She started to have alternating bowel movements between constipation or extreme diarrhea.  She never feels regular.  She has noticed that symptoms are worse during times of stress.  She has not been able to determine any dietary triggers and has not tried any elimination diets.  She can experience 2-3 urgent loose stools with diarrhea followed by 1 to 2 days of no bowel movements.  When constipated bowel movements are hard and difficult to pass and require straining.  She does feel that she is more often constipated than with diarrhea.  She has occasionally seen small amounts of bright red blood per rectum which she suspects could be a hemorrhoid.  She otherwise denies any hematochezia or melena.  She does have some bloating and discomfort with constipation which usually improves after having bowel movements.  She otherwise does not have much in terms of abdominal pain.  She was given Colace to take during times of constipation but she feels that this led to further diarrhea. She does drink lots of water. She is usually active but this has decreased.     She also reports having heartburn since childhood.  She primarily notes that at night when laying down to sleep.  She does not eat too close to bedtime.  She usually has dinner around 5 or 6 pm and goes to bed by 10 PM.  She does not snack late. She has tried sleeping elevated but is not very comfortable.  She was given Prilosec to try last year.  She tried  this for about 3 weeks and did not see an improvement so she discontinued as she prefers not to take medications.  There is occasional nausea.  She denies vomiting.  No dysphagia.  No odynophagia.  She does experience phlegm most mornings.  Had previously met with an ENT and was told this could be related to reflux as well. She does not use NSAID's.     She also reports feeling very fatigued and exhausted. Recent labs note unremarkable cbc, cmp, TSH, free T4 and ESR.     She does not smoke.  She may have 1 drink every few weeks.    Her family history significant for IBS in her sister.  Both her parents have heartburn.  Mom has history of gallbladder disease.  No known family history of celiac disease or IBD.      Hipolito  has a past medical history of Abnormal Pap smear of cervix (08/12/2020), Asthma, mild persistent, Concussion (1/2012), and Mononucleosis (06/2019).    She  has a past surgical history that includes Arthroscopic reconstruction anterior cruciate ligament (8/19/13); TOOTH EXTRACTION W/FORCEP; and Esophagoscopy, gastroscopy, duodenoscopy (EGD), combined (N/A, 12/2/2016).    She  reports that she has never smoked. She has never used smokeless tobacco. She reports current alcohol use. She reports that she does not use drugs.    Her family history includes Cancer in her maternal grandmother; Diabetes in her maternal grandmother and mother; GERD in her mother; Gallbladder Disease in her mother; Hypertension in her father and paternal grandfather; Lipids in her father.    ALLERGIES:  Patient has no known allergies.    PERTINENT MEDICATIONS:    Current Outpatient Medications:     metroNIDAZOLE (METROGEL) 0.75 % vaginal gel, Apply vaginally twice weekly for 4-6 months., Disp: 70 g, Rfl: 11    Multiple Vitamin (MULTIVITAMINS PO), Take 1 tablet by mouth daily., Disp: , Rfl:     norgestimate-ethinyl estradiol (ORTHO-CYCLEN) 0.25-35 MG-MCG tablet, Take 1 tablet by mouth daily, Disp: 84 tablet, Rfl: 4    Biotin 10 MG  CAPS, , Disp: , Rfl:     Cholecalciferol (VITAMIN D) 1000 UNITS capsule, Take 1 capsule by mouth daily. (Patient not taking: Reported on 8/7/2023), Disp: , Rfl:     docusate sodium (COLACE) 100 MG capsule, Take 1 capsule (100 mg) by mouth 2 times daily as needed for constipation (Patient not taking: Reported on 8/7/2023), Disp: 180 capsule, Rfl: 11    omeprazole (PRILOSEC) 40 MG DR capsule, Take 1 capsule (40 mg) by mouth daily (Patient not taking: Reported on 8/7/2023), Disp: 90 capsule, Rfl: 3      PHYSICAL EXAMINATION:  Constitutional: aaox3, cooperative, pleasant, not dyspneic/diaphoretic, no acute distress      GENERAL: Healthy, alert and no distress  EYES: Eyes grossly normal to inspection.  No discharge or erythema, or obvious scleral/conjunctival abnormalities.  RESP: No audible wheeze, cough, or visible cyanosis.  No visible retractions or increased work of breathing.    SKIN: Visible skin clear. No significant rash, abnormal pigmentation or lesions.  NEURO: Cranial nerves grossly intact.  Mentation and speech appropriate for age.  PSYCH: Mentation appears normal, affect normal/bright, judgement and insight intact, normal speech and appearance well-groomed.          PERTINENT STUDIES: (I personally reviewed these laboratory studies today)  Most recent CBC:   Recent Labs   Lab Test 07/19/23  1205 10/10/22  0822   WBC 7.0 11.3*   HGB 13.6 13.6   HCT 42.4 41.0    296     Most recent hepatic panel:  Recent Labs   Lab Test 07/19/23  1205 10/10/22  0822   ALT 22 31   AST 24 21     Most recent creatinine:  Recent Labs   Lab Test 07/19/23  1205 10/10/22  0822   CR 0.77 0.69       TSH   Date Value Ref Range Status   07/19/2023 2.37 0.30 - 4.20 uIU/mL Final           ASSESSMENT/PLAN:    1. Irritable bowel syndrome with both constipation and diarrhea  - Nutrition Referral  - Adult GI Clinic Follow-Up Order (Blank); Future    2. Chronic constipation  - Adult GI  Referral - Consult Only    3. Diarrhea,  unspecified type  - Tissue transglutaminase harpreet IgA and IgG; Future  - IgA; Future  - CRP inflammation; Future  - Calprotectin Feces; Future  - C. difficile Toxin B PCR with reflex to C. difficile Antigen and Toxins A/B EIA; Future  - Enteric Bacteria and Virus Panel PCR; Future    4. Gastroesophageal reflux disease, unspecified whether esophagitis present  - omeprazole (PRILOSEC) 40 MG DR capsule; Take 1 capsule (40 mg) by mouth every morning On an empty stomach about 30 minutes before breakfast  Dispense: 90 capsule; Refill: 0    5. LPRD (laryngopharyngeal reflux disease)  - omeprazole (PRILOSEC) 40 MG DR capsule; Take 1 capsule (40 mg) by mouth every morning On an empty stomach about 30 minutes before breakfast  Dispense: 90 capsule; Refill: 0    6. Fatigue, unspecified type  - Adult GI  Referral - Consult Only  - Iron & Iron Binding Capacity; Future  - Ferritin; Future  - Vitamin D Deficiency; Future  - Vitamin B12; Future  - Folate; Future        Arlen William is a 23 year old female who presents for GI consult for the following:     She has a several year history of alternating bowel movements. Primarily feels constipated but also has diarrhea. Impacted by stress. Unaware of any dietary triggers. No alarming symptoms such as weight loss or rectal bleeding. No abdominal pain. Suspect IBS however differential also includes malabsorptive d/o, infectious/inflammatory. We will check several labs and stool studies. Work up to include celiac screening, CRP, nutrient markers, fecal calpro, c. Diff and enteric pathogens. We will also refer to nutritionist to review an elimination diet to eval for dietary triggers. Recommended starting a fiber supplement daily. Can take miralax as needed for constipation as this seems to be predominant. Offered [facility] referral, pt politely declines.     She also has GERD and LPRD frequently. Not currently on therapy. Recommended retrying PPI. Offered more potent PPI such as  nexium however she does still have omeprazole and willing to give this a try again. Recommended taking on an empty stomach about 30 minutes before breakfast.       Follow up in 3-4 months, sooner if needed.     Thank you for this consultation.  It was a pleasure to participate in the care of this patient; please contact us with any further questions.        This note was created with voice recognition software, and while reviewed for accuracy, typos may remain.       I spent a total of 77 minutes on the day of the visit.   Time spent by me doing chart review, history and exam, documentation and further activities per the note      Lee Ann Carbajal PA-C  Division of Gastroenterology, Hepatology and Nutrition   [facility]            Video-Visit Details    Video Start Time: 7:32 AM    Type of service:  Video Visit    Video End Time:8:10 AM    Originating Location (pt. Location): Home    Distant Location (provider location):  Off-site    Platform used for Video Visit: [employer]

## 2023-08-07 NOTE — PATIENT INSTRUCTIONS
It was a pleasure taking care of you today.  I've included a brief summary of our discussion and care plan from today's visit below.  Please review this information with your primary care provider.  _______________________________________________________________________     My recommendations are summarized as follows:     -- Schedule a lab appointment for blood work and stool studies  --Schedule an appointment with our nutritionist, (if you have not heard from scheduling then please call call [phone] to schedule)  --Restart Prilosec.  This is best taken on an empty stomach about 30 minutes before breakfast.  It can take several weeks to take effect.  -- you can use tums or pepcid as needed for any persistent reflux symptoms  --The recommended amount of daily fiber for an adult ranges from 20 to 35 grams.  You can increase your fiber through your diet or through a fiber supplement.  If you choose to use a fiber supplement: I recommend a powdered soluble fiber such as metamucil or citrucel. This can help regulate your bowel movements and promote better consistency of your stools. Start with 1-2 teaspoons per day, with goal to gradually increase to 1 tablespoon daily. You can increase up to 1 tablespoon three times daily if needed. Stay well-hydrated with use of fiber supplementation and to make sure that the fiber powder is well mixed with water as directed on the label.   - you can take miralax 1 capful daily as needed for constipation.   - Follow up in 3-4 months, sooner if needed      ______________________________________________________________________     How do I schedule labs, imaging studies, or procedures that were ordered in clinic today?      Labs: To schedule lab appointment you can contact your local [facility] or call [phone] to schedule at any convenient [facility] location.     Procedures: If a colonoscopy, upper endoscopy, breath test, esophageal manometry, or pH  impedence was ordered today, our endoscopy team will call you to schedule this. If you have not heard from our endoscopy team within a week, please call [phone] to schedule.      Imaging Studies: If you were scheduled for a CT scan, X-ray, MRI, ultrasound, HIDA scan or other imaging study, please call [phone] to have this scheduled.      Referral: If a referral to another specialty was ordered, expect a phone call or follow instructions above. If you have not heard from anyone regarding your referral in a week, please call our clinic to check the status.      Who do I call with any questions after my visit?  Please be in touch if there are any further questions that arise following today's visit.  There are multiple ways to contact your gastroenterology care team.       During business hours, you may reach a Gastroenterology nurse at [phone]     To schedule or reschedule an appointment, please call [phone].      You can always send a secure message through [employer].  [employer] messages are answered by your nurse or doctor typically within 24 hours.  Please allow extra time on weekends and holidays.       For urgent/emergent questions after business hours, you may reach the on-call GI Fellow by contacting the [facility]  at [phone].     How will I get the results of any tests ordered?    You will receive all of your results.  If you have signed up for [employer], any tests ordered at your visit will be available to you after your physician reviews them.  Typically this takes 1-2 weeks.  If there are urgent results that require a change in your care plan, your physician or nurse will call you to discuss the next steps.       What is [employer]?  [employer] is a secure way for you to access all of your healthcare records from the [facility].  It is a web based computer program, so you can sign on to it from any location.  It also allows you to send secure messages to your  care team.  I recommend signing up for [email or web address] access if you have not already done so and are comfortable with using a computer.       How to I schedule a follow-up visit?  If you did not schedule a follow-up visit today, please call [phone] to schedule a follow-up office visit.      Lee Ann Carbajal PA-C  Division of Gastroenterology, Hepatology and Nutrition   [facility]

## 2023-08-08 ENCOUNTER — MYC MEDICAL ADVICE (OUTPATIENT)
Dept: OBGYN | Facility: CLINIC | Age: 23
End: 2023-08-08
Payer: COMMERCIAL

## 2023-08-09 NOTE — TELEPHONE ENCOUNTER
Last office visit 6/16/23: recurrent BV, metrogel 2x/week for 4-6 months    Experiencing vaginal itchiness, Wet prep 8/4/23 positive for yeast - diflucan sent    Pt message today, experiencing no relief since taking diflucan, extremely uncomfortable itchiness.

## 2023-08-16 ENCOUNTER — MYC MEDICAL ADVICE (OUTPATIENT)
Dept: OBGYN | Facility: CLINIC | Age: 23
End: 2023-08-16
Payer: COMMERCIAL

## 2023-08-16 DIAGNOSIS — N89.8 VAGINAL ITCHING: Primary | ICD-10-CM

## 2023-08-16 NOTE — TELEPHONE ENCOUNTER
"Pt sent initial mychart to Dr. Zepeda on 8/8-\"I wanted to give it a few days for the medication.. but I am unfortunately experiencing no relief after taking the prescribed medication for the yeast infection. I am not not quite sure what to do - but the itchiness is extremely uncomfortable. \"    Pt reply to our message was that she's just having the discharge an ditching  Pt sexually active with partner who was \"questionably loyal\"  Taking suppository as prescribed-pt was prescribed metrogel in June-  metroNIDAZOLE (METROGEL) 0.75 % vaginal gel 70 g 11 6/16/2023  --   Sig: Apply vaginally twice weekly for 4-6 months.     Routing to provider to advise.  Sofy Ordonez RN on 8/16/2023 at 12:05 PM      "

## 2023-09-06 ENCOUNTER — LAB (OUTPATIENT)
Dept: LAB | Facility: CLINIC | Age: 23
End: 2023-09-06
Payer: COMMERCIAL

## 2023-09-06 ENCOUNTER — VIRTUAL VISIT (OUTPATIENT)
Dept: NUTRITION | Facility: CLINIC | Age: 23
End: 2023-09-06
Attending: PHYSICIAN ASSISTANT
Payer: COMMERCIAL

## 2023-09-06 DIAGNOSIS — R53.83 FATIGUE, UNSPECIFIED TYPE: ICD-10-CM

## 2023-09-06 DIAGNOSIS — N89.8 VAGINAL ITCHING: ICD-10-CM

## 2023-09-06 DIAGNOSIS — K59.09 CHRONIC CONSTIPATION: Primary | ICD-10-CM

## 2023-09-06 DIAGNOSIS — R19.7 DIARRHEA, UNSPECIFIED TYPE: ICD-10-CM

## 2023-09-06 LAB
C TRACH DNA SPEC QL NAA+PROBE: NEGATIVE
CLUE CELLS: ABNORMAL
CRP SERPL-MCNC: <3 MG/L
DEPRECATED CALCIDIOL+CALCIFEROL SERPL-MC: 44 UG/L (ref 20–75)
FERRITIN SERPL-MCNC: 23 NG/ML (ref 6–175)
FOLATE SERPL-MCNC: 14.4 NG/ML (ref 4.6–34.8)
IRON BINDING CAPACITY (ROCHE): 387 UG/DL (ref 240–430)
IRON SATN MFR SERPL: 13 % (ref 15–46)
IRON SERPL-MCNC: 50 UG/DL (ref 37–145)
N GONORRHOEA DNA SPEC QL NAA+PROBE: NEGATIVE
TRICHOMONAS, WET PREP: ABNORMAL
VIT B12 SERPL-MCNC: 252 PG/ML (ref 232–1245)
WBC'S/HIGH POWER FIELD, WET PREP: ABNORMAL
YEAST, WET PREP: ABNORMAL

## 2023-09-06 PROCEDURE — 87210 SMEAR WET MOUNT SALINE/INK: CPT

## 2023-09-06 PROCEDURE — 87591 N.GONORRHOEAE DNA AMP PROB: CPT | Performed by: OBSTETRICS & GYNECOLOGY

## 2023-09-06 PROCEDURE — 86364 TISS TRNSGLTMNASE EA IG CLAS: CPT

## 2023-09-06 PROCEDURE — 83550 IRON BINDING TEST: CPT

## 2023-09-06 PROCEDURE — 83540 ASSAY OF IRON: CPT

## 2023-09-06 PROCEDURE — 97802 MEDICAL NUTRITION INDIV IN: CPT | Mod: VID | Performed by: DIETITIAN, REGISTERED

## 2023-09-06 PROCEDURE — 36415 COLL VENOUS BLD VENIPUNCTURE: CPT

## 2023-09-06 PROCEDURE — 86140 C-REACTIVE PROTEIN: CPT

## 2023-09-06 PROCEDURE — 82607 VITAMIN B-12: CPT

## 2023-09-06 PROCEDURE — 87491 CHLMYD TRACH DNA AMP PROBE: CPT | Performed by: OBSTETRICS & GYNECOLOGY

## 2023-09-06 PROCEDURE — 82728 ASSAY OF FERRITIN: CPT

## 2023-09-06 PROCEDURE — 82784 ASSAY IGA/IGD/IGG/IGM EACH: CPT

## 2023-09-06 PROCEDURE — 82306 VITAMIN D 25 HYDROXY: CPT

## 2023-09-06 PROCEDURE — 82746 ASSAY OF FOLIC ACID SERUM: CPT

## 2023-09-06 NOTE — PROGRESS NOTES
Medical Nutrition Therapy  Visit Type: Initial assessment and intervention    Visit Details    How would you like to obtain your AVS? MyChart  If the correspondence for visit is dropped, how would you like your dietitian to reconnect with you:   call back by phone? Yes    Text to cell phone: [phone]  Will anyone else be joining your video visit or telephone call? No  {If patient encounters technical issues they should call [phone]    Type of service:  Video Visit   Start Time:  2:33pm    End Time:3:55 PM    Originating Location (pt. Location): Home    Distant Location (provider location):  [facility] - remote offsite   Platform used for Video Visit: Daisy      Referring Provider: Lee Ann Carbajal  On license of [facility]     REASON FOR REFERRAL:   Arlen William is a 23 year old female who is interested in Medical Nutrition Therapy (MNT) and education related to Irritable bowel syndrome with both constipation and diarrhea   Fatigue, unspecified type   Chronic constipation   .   She is accompanied by self.     NUTRITION ASSESSMENT:   She tends to struggle with bloating and stomach ache throughout the day and would love to start the day going to the gym but has a constant feeling of fatigue. Usually coffee helps her have Bms, she feels she needs it go to the bathroom. She usually has a BM daily. She is still struggling with alternating bowel movements between constipation or extreme diarrhea.  She does experience phlegm most mornings. She hasn't committed to cutting out dairy mostly cheese/cream cheese. She hasn't tried taking out wheat either however doesn't eat wheat from bread much.     9-10am Breakfast: will do oats rolled plain added water with fresh fruits and peanut butter with cinnamon and honey   Coffee with creamer or water or la croix  Eats lunch later anywhere from 1-3pm lighter bagel adds cream cheese   Usually more lazy for dinner which will be a bigger meal will  eat later and but before gym around 5-7pm sometimes only eats half of dinner if eating before gym. She struggles with dinner the most will do a meat such as steak of chicken. She doesn't like carbs so doesn't eat often. She might have a lot of fruit for dinner.         9/5/2023     3:41 PM   Nutritional Goals   Nutritional Goal Create healthier eating patterns;Work on meal planning/recipes;Manage Digestive Issues;Address nutrient deficiencies;Identify adverse food reactions or triggers;Increase energy             No data to display                        No data to display                   9/5/2023     3:41 PM   Gastrointestinal   Constipation Past;Current   Hemorrhoids Past   Gas/bloating Past   Gallstones Past   Abdominal Pain Past            No data to display                    No data to display                   9/5/2023     3:41 PM   Skin   Acne Past           No data to display                    No data to display                    No data to display                    No data to display                   9/5/2023     3:41 PM   Womens Health Assessment   Hysterectomy No   I am pregnant.  No   I am breastfeeding. No   I want to become pregnant within the next year . No   What do you use for contraception?  birth control pill   Any problems with current birth control method?   Yes   Date of last menstrual period 9/5/2023   Are your periods irregular? Yes   Irregular periods Bleed between periods   Days from the start of one period to the next. 18   Days of Menstral Flow 6   Associated with menstral periods. Painful/cramping;Heavy bleeding;Mood swings;Bloating;Breast tenderness;Diarrhea/constipation   Are you officially in menopause? (no period for one year or longer)  No        Past Medical History:  Past Medical History:   Diagnosis Date    Abnormal Pap smear of cervix 08/12/2020    see problem list    Asthma, mild persistent     Concussion 1/2012    Impact test 3/12    Mononucleosis 06/2019       Previous  Surgeries:   Past Surgical History:   Procedure Laterality Date    ARTHROSCOPIC RECONSTRUCTION ANTERIOR CRUCIATE LIGAMENT  8/19/13    left    ESOPHAGOSCOPY, GASTROSCOPY, DUODENOSCOPY (EGD), COMBINED N/A 12/2/2016    Procedure: COMBINED ESOPHAGOSCOPY, GASTROSCOPY, DUODENOSCOPY (EGD), BIOPSY SINGLE OR MULTIPLE;  Surgeon: Laurie Pride MD;  Location: [facility]     HC TOOTH EXTRACTION W/FORCEP          Family History:  Family History   Problem Relation Age of Onset    Diabetes Mother         gestational     GERD Mother     Gallbladder Disease Mother     Diabetes Maternal Grandmother     Cancer Maternal Grandmother         ovarian and uterine    Hypertension Father     Lipids Father     Hypertension Paternal Grandfather     Family History Negative No family hx of     Asthma No family hx of         Eosinophilic esophagitis.    C.A.D. No family hx of     Breast Cancer No family hx of     Cerebrovascular Disease No family hx of     Cancer - colorectal No family hx of     Prostate Cancer No family hx of     Alcohol/Drug No family hx of     Allergies No family hx of     Alzheimer Disease No family hx of     Anesthesia Reaction No family hx of     Arthritis No family hx of     Blood Disease No family hx of     Cardiovascular No family hx of     Circulatory No family hx of     Congenital Anomalies No family hx of     Connective Tissue Disorder No family hx of     Depression No family hx of     Endocrine Disease No family hx of     Eye Disorder No family hx of     Genetic Disorder No family hx of     Gastrointestinal Disease No family hx of     Genitourinary Problems No family hx of     Gynecology No family hx of     Musculoskeletal Disorder No family hx of     Anxiety Disorder No family hx of     Mental Illness No family hx of     Substance Abuse No family hx of     Colon Cancer No family hx of     Other Cancer No family hx of     Thyroid Disease No family hx of     Obesity No family hx of      Osteoporosis No family hx of     Unknown/Adopted No family hx of     Hyperlipidemia No family hx of     Coronary Artery Disease No family hx of     Other - See Comments No family hx of         Lifestyle History:      9/5/2023     3:41 PM   Lifestyle   Do you feel your life is stressful right now?  Yes   What is the cause(s) of stress in your life?  Work;Health Concerns;Multi-tasking and multiple deadlines to meet   Are you currently implementing any strategies to help manage stress? Yes   What are you doing to manage stress?  Counseling;Journaling;Take time for self;Reading        Exercise History:      9/5/2023     3:41 PM   Exercise   Does your occupation require extended periods of sitting?  Yes   Does your occupation require extended periods of repetitive movements (ex: walking or lifting)?  No   Do you currently participate in any forms of exercise? Yes   Check all the exercises you participate in: Walking;Weight Lifting   How many times per week do you exercise? 3 times   How long do you usually exercise? 60 min        Sleep History:      9/5/2023     3:41 PM   Sleep   How many hours (on average) do you sleep per night? 7-9   What time do you turn off the lights? 10 PM   How long does it take for you to fall asleep? 15-20 mins   What time do you stop using electronic devices? 10 PM   What time do you wake up? 6 AM   When do you eat your first meal?  10 AM   Do you feel well-rested during the day?  No   Do you take naps?  No   Do you have a comfortable bedroom environment (cool, quiet, dark, etc)? Yes   Do you have a sleep routine/ ritual that you do before bed?  Yes   How many hours do you spend per day looking at a screen (TV, computer, tablet and phone)? 7 to 9   Select all factors that apply to your current sleep habits: Have difficulty waking up in the morning;Feel tired/sluggish/fatigued during the day;Have tried supplements (ie: melatonin) for sleep        Nutrition History:      9/5/2023     3:41 PM    Nutrition   Have you ever had a nutrition consultation? No   Do you currently follow a special diet or nutritional program? No   What do you feel are the biggest barriers getting in the way of achieving you nutritional goals? Motivation/Readiness to change;Lack of time;Work (such as lack of time to eat, unhealthy choices, etc.);Lack of nutrition knowledge;Stress   Do you have any food allergies, sensitivities or intolerances?  No           9/5/2023     3:41 PM   Digestion   Do you experience stomach pains/cramping? Rarely   Do you experience bloating?  Daily   Do you experience gas?  Weekly   Do you experience heartburn/acid reflux/indigestion? 2-3 times per week   How often do you have a bowel movement? 1 time per day   What is a typical bowel movement like for you? Select all that apply: Hard to pass;Varies a lot;Loose;Liquid;Formed and soft;Alternates between loose and hard;Mucous, greasy          9/5/2023     3:41 PM   Food Access:    Who does the grocery shopping? Self   How often is grocery shopping done? Weekly   Where do you usually receive your groceries from? Select all that apply: [employer]   Do you read food labels? No   Who does the cooking? Select all that apply: Self   How many meals do you eat out per week?  3 to 5   What restaurants do you typically choose? Sit-down          9/5/2023     3:41 PM   Daily Patterns:   How many days per week do you have breakfast? 6   How many days per week do you have lunch? 5   How many days per week do you have dinner? 5   How many days per week do you have snacks? 2          9/5/2023     3:41 PM   Protein Intake:   How many times per day do you typically consume a protein source(s)? 2   What types of protein do you currently eat?  Steak;Other Beef;Chicken Breast;Deli Turkey;Eggs           9/5/2023     3:41 PM   Fat Intake:    How many times per day do you typically consume healthy fat(s)? 1   What types of health fats do you currently eat? Select all  that apply:  Cashews;Peanut butter;Avocado oil           9/5/2023     3:41 PM   Fruit Intake:    How many times per day do you typically consume fruits? 2   What types of fruit do currently eat? Banana;Blackberries;Blueberries;Cantaloupe;Grapes;Mandarin oranges;[place];Melon;Pineapple;Raspberries;Watermelon;Other           9/5/2023     3:41 PM   Vegetable Intake:    How many times per day do you typically consume vegetables? 1   What types of vegetables do you currently eat? Potato (baked, boiled, mashed, French fries);Spinach;Sugar snap peas;Tomato          9/5/2023     3:41 PM   Grain Intake:    How many times per day do you typically consume grains? 2   What types of grains do currently eat? Select all that apply:  Oats (gluten free);Bagel (non-gluten free);Breads (non-gluten free)           9/5/2023     3:41 PM   Dairy Intake:    How many times per day do you typically consume dairy? 2   What types of dairy do currently eat? Select all that apply:  Milk;Cheese;Yogurt           9/5/2023     3:41 PM   Non-Dairy Alternative Intake:    How many times per day do you typically consume non-dairy alternatives? 0   What types of non-dairy alternatives do currently eat? Select all that apply:  Goat cheese           9/5/2023     3:41 PM   Sweets Intake:    How many times per day do you typically consume sweets? 1          9/5/2023     3:41 PM   Beverage Intake:    How many 8 oz cups of water do you typically consume per day?  6 to 7   How many 8 oz cups of caffeine do you typically consume per day?  1 to 2   How many drinks of alcohol do you typically consume per week (1 drink = 5 oz wine, 12 oz beer, 1.5 oz spirits)?   1 to 3           9/5/2023     3:41 PM   Lifestyle Recall:    What time did you wake up? 8 AM   What time did you go to sleep? 11 PM   What time did you have breakfast? 9-10 AM   Where did you have breakfast?  Home   What time did you have a morning snack? No snack   What time did you have lunch? 1-2 PM    Where did you have lunch?  Home   What time did you have an afternoon snack? No snack   What time did you have dinner? 6-7 PM   Where did you have dinner?  Home   What time did you have an evening snack? No Snack   What time of day did you exercise? 4 PM   Where did you exercise? Gym/Fitness Center          Additional concerns:          MEDICATIONS:  Current Outpatient Medications   Medication Sig Dispense Refill    Biotin 10 MG CAPS  (Patient not taking: Reported on 8/7/2023)      Cholecalciferol (VITAMIN D) 1000 UNITS capsule Take 1 capsule by mouth daily. (Patient not taking: Reported on 8/7/2023)      metroNIDAZOLE (METROGEL) 0.75 % vaginal gel Apply vaginally twice weekly for 4-6 months. 70 g 11    Multiple Vitamin (MULTIVITAMINS PO) Take 1 tablet by mouth daily.      norgestimate-ethinyl estradiol (ORTHO-CYCLEN) 0.25-35 MG-MCG tablet Take 1 tablet by mouth daily 84 tablet 4    omeprazole (PRILOSEC) 40 MG DR capsule Take 1 capsule (40 mg) by mouth every morning On an empty stomach about 30 minutes before breakfast 90 capsule 0            No data to display                  ALLERGIES:   No Known Allergies     .na  LABS:  Last Basic Metabolic Panel:  Lab Results   Component Value Date     07/19/2023     10/10/2022      Lab Results   Component Value Date    POTASSIUM 4.7 07/19/2023    POTASSIUM 4.0 10/10/2022     Lab Results   Component Value Date    CHLORIDE 108 07/19/2023    CHLORIDE 107 10/10/2022     Lab Results   Component Value Date    ALVIN 9.7 07/19/2023    ALVIN 9.0 10/10/2022     Lab Results   Component Value Date    CO2 27 07/19/2023    CO2 28 10/10/2022     Lab Results   Component Value Date    BUN 12.4 07/19/2023    BUN 9 10/10/2022     Lab Results   Component Value Date    CR 0.77 07/19/2023    CR 0.69 10/10/2022     Lab Results   Component Value Date    GLC 85 07/19/2023    GLC 89 10/10/2022       Last Glucose Profile:   No results found for: A1C    Last Lipid Profile:   No results found  "for: CHOL  No results found for: HDL  No results found for: LDL  No results found for: TRIG  No results found for: CHOLHDLRATIO    Most recent CBC:  Recent Labs   Lab Test 07/19/23  1205 10/10/22  0822 03/08/22  1558   WBC 7.0 11.3* 9.7   HGB 13.6 13.6 13.1   HCT 42.4 41.0 40.0    296 303     Most recent hepatic panel:  Recent Labs   Lab Test 07/19/23  1205 10/10/22  0822   ALT 22 31   AST 24 21     Most recent creatinine:  Recent Labs   Lab Test 07/19/23  1205 10/10/22  0822   CR 0.77 0.69       No components found for: GFRESETIMATEDLASTLAB(gfrestblack:1@  Lab Results   Component Value Date    ALBUMIN 4.6 07/19/2023    ALBUMIN 3.4 10/10/2022       Last Thyroid Profile:   TSH   Date Value Ref Range Status   07/19/2023 2.37 0.30 - 4.20 uIU/mL Final     Free T4   Date Value Ref Range Status   07/19/2023 1.26 0.90 - 1.70 ng/dL Final       Last Mineral Profile:   No results found for: ASTRID, IRON, FEB    Autoimmune & Inflammatory   CRP Inflammation   Date Value Ref Range Status   03/08/2022 <2.9 0.0 - 8.0 mg/L Final         Last Vitamin Profile:   25 OH Vit D2   Date Value Ref Range Status   03/07/2012 <5 ug/L Final     25 OH Vit D3   Date Value Ref Range Status   03/07/2012 25 ug/L Final     25 OH Vit D total   Date Value Ref Range Status   03/07/2012  ug/L Final    <30  Season, race, dietary intake, and treatment affect the concentration of   25-hydroxy-Vitamin D. Values may decrease during winter months and increase   during summer months. Values less than 30 ug/L may indicate Vitamin D   deficiency.       ANTHROPOMETRICS:  Vitals:   BP Readings from Last 1 Encounters:   06/16/23 (!) 131/90     Pulse Readings from Last 1 Encounters:   06/16/23 86     Estimated body mass index is 24.18 kg/m  as calculated from the following:    Height as of 12/21/22: 1.727 m (5' 8\").    Weight as of 6/16/23: 72.1 kg (159 lb).    Wt Readings from Last 5 Encounters:   06/16/23 72.1 kg (159 lb)   12/21/22 72.6 kg (160 lb)   12/06/22 " 71.7 kg (158 lb)   12/01/22 73 kg (161 lb)   10/10/22 73 kg (161 lb)         NUTRITION DIAGNOSIS:  1. Excessive intake of high FODMAP foods related to food and nutrition knowledge deficit regarding effect of FODMAPs, as evidenced by symptoms of increased bloating, gas, soft and loose stools back to constipation again.     2. Inadequate fiber intake related to food and nutrition knowledge deficit regarding desirable quantities of fiber, as evidenced by bloating, gas, soft and loose stools and constipation.         NUTRITION INTERVENTION:  Low FODMAP Elimination and Reintroduction Diet     Long Term Goals:   Goal: 1-2 bowl movement daily [place] Stool Type 4 soft and formed  Goal: Decrease digestive symptoms -loose stool, gas, bloating, and constipation       Short Term Goals:  Goal 1: Cut back on wheat/gluten and dairy. Focus on reviewing high FODMAP foods (info below and handout provided) specifically, onion, garlic, legums/beans, nuts - almonds, coconut milk, cashews to see if they trigger symptoms.   .      Goal 2: Focus on starting a low fodmap smoothie for breakfast with focus on adding in adequate fiber. See recipes provided and try to add in some collagen protein to the smoothies that you already have on hand.      Also, check out some of the other breakfast ideas provided such as oatmeal (naturally gluten free) with dairy free alternative milk, flax seed, nuts such as walnuts, fruit such as berries and protein such as chicken/turkey sausage.     Try an 1/8 avocado or 1 tbsp of nut butter ex peanut butter on slice of gluten or sourdough bread - peanuts are low in fodmaps but a legume so monitor if trigger symptoms. Can also try some other nut butters such as almond or sunflower these are higher fodmap foods but important for you to identify if they trigger symptoms.         Goal 3:  Try to add in a snack at least 1-2x per day with focus on adding in extra fiber. Meal and snack planning can be challenging at  the best of times, particularly for those of us following a low FODMAP diet. Snacks are an important part of meal planning, as they can help to fill the gaps nutritionally i.e. snacking on yoghurt/cheese and biscuits to top up calcium intake for the day. However, for some people and at certain times of day (e.g. in between work and dinner), snacking can become problematic not just from a FODMAP point of view, but also in regards to calorie intake and general healthy eating. Here are some ohealthy low FODMAP snack ideas. It is important that also monitor how you handle dairy from cheese for example it could trigger symptoms even if a low fodmap.     Tips and tricks:   Mix and match your food groups - adding a source of protein to your snacks will keep you feeling kinsey for longer. E.g. peanut butter on rice cakes or gluten free toast, yoghurt & fruit, cheese & crackers.- try [employer] Chips - monitor if the almond flour triggers symptoms as almonds are higher fodmap sometimes you can consume small portions and still be ok and or try Siete Chips as they are gluten/wheat free.     Get organised at the start of the week - although meal prepping can be laborious, you will thank yourself for putting some extra time aside for snacks. See our snacks that require preparation below.      Have food handy for when you are on the move - you might have pre-packaged snacks ready in your bag or drawer at work (e.g. pack of mixed nuts, fruit or even jar of brazil nuts), or in the fridge at home (e.g. chopped up vegetables and dips).     Go for dairy - a low FODMAP diet does not mean a low dairy diet! Dairy is a great source of protein, calcium, and other vitamins/minerals with plenty of suitable lactose-free options available for those following a lactose-free diet. On-the-go pouch yoghurts (look for low sugar and monitor symptoms), as well as cheese & crackers are excellent choices for those who are busy and might find  themselves snacking on the move.     Boost your fibre intake: eating fibre containing foods helps us to feel kinsey for longer. Keep the peels on the low FODMAP fruits and vegetables, sprinkle extra seeds (i.e. ayaan or kee) on your yoghurt, and consider wholegrain varieties where possible when choosing low FODMAP breads.     Consider your reusable food containers - gone are the days of simply taking a piece of fruit as a snack. These days, food storage containers come in a variety of shapes and sizes for all sorts of tasty snacks. This can be particularly helpful when preparing meals and snacks the night before a busy work or school day e.g. squeeze pouches for yoghurt if tolerated (now there is almond and coconut dairy free varieties) or containers with separate compartments for dip and vegetables.     Snacks that require preparation:   Smoothies: green, summer berry, ariane peanut butter   Sweet: peanut butter bars, muesli bars, mixed berry & yoghurt granola bar  Savoury: savoury slice, vegetable muffins, rice paper roll, zucchini and rice slice  Energy balls: Oat & ariane chip, peanut butter protein balls, double ariane bliss balls    Snacks with minimal preparation:  Protein: Boiled eggs, tuna can, 20g mixed nuts or 10 almonds  Fruit: kiwi fruit, pineapple, mandarin, orange, firm banana, rockmelon/cantaloupe  Dairy & alternatives: yoghurt (lactose-free), hot chocolate made with drinking chocolate or latte made with lactose-free if required or soy milk (made from soy protein), cheese & crackers  Vegetables: vegetables with dip, pre-made sweet potato chips (baked with olive oil and a sprinkle of spices)  Gluten free toast or rice crackers with toppings: peanut butter, cheese, marmalade     Goal 4: Tracking what you eat. Writing down or tracking through an ahsan what you eat as well as how you feel can help you identify patterns in your symptoms. This can help you become more aware and create a diet that is right for  you without over restricting.     mysymptoms ahsan, you can track symptoms, bowl movements, medications, stress, exercise, sleep and foods as well as beverages to become more mindful. [email or web address]    Goal 5: Start probiotic lactic acid based by ruthy duarte MD either the 30 or 100 billion for at least 30-90 days let me know how you tolerate as it could be beneficial to add a soil based such as the following   [email or web address]://[email or web address]/products/sbo-probiotics-ultimate    Start one muli by pure encapsulations 1 cap per day with food and womens omega 3 by nordic naturals 2 cap per day with food     Goal 6: Work on just meal planning check out [email or web address]     What are FODMAPs?  FODMAP stands for fermentable oligo-, di-, monosaccharides and polyols    These short-chain carbs are resistant to digestion. Instead of being absorbed into your bloodstream, they reach the far end of your intestine, where most of your gut bacteria reside.    Your gut bacteria then use these carbs for fuel, producing hydrogen gas and causing digestive symptoms in sensitive individuals. FODMAPs also draw liquid into your intestine, which may cause diarrhea.    Although not everyone has a sensitivity to FODMAPs, it is very common among people with irritable bowel syndrome (IBS)     Common FODMAPs include:    Fructose: a simple sugar found in many fruits and vegetables that also makes up the structure of table sugar and most added sugars  Lactose: a carbohydrate found in dairy products like milk  Fructans: found in many foods, including grains like wheat, spelt, rye and barley  Galactans: found in large amounts in legumes  Polyols: sugar alcohols like xylitol, sorbitol, maltitol, and mannitol. They are found in some fruits and vegetables and often used as sweetener      What is the purpose of a FODMAP diet?  A FODMAP diet is a 3 step diet used to help manage digestive symptoms.  Common gut problem with symptoms including abdominal  (tummy) pain, bloating, wind (farting) and changes in bowel habit (diarrhea, constipation or both).     The aims of the diet are to:    Learn which foods and FODMAPs you tolerate, and which trigger your digestive symptoms. Understanding this will help you to follow a less restrictive, more nutritionally balanced diet for the long term that only restricts foods that trigger your digestive symptoms.    Assess whether your digestive symptoms are sensitive to FODMAPs. If you don't experience symptom improvement on the diet, than you may need to consider other digestive therapies.      How to follow a FODMAP diet      Stage 1: Restriction/Elimination   This stage involves strict avoidance of all high-FODMAP foods. It's important to note you should NOT avoid all FODMAPs long-term, and this stage should only last about 4-12 weeks. This is because it's important to include FODMAPs in the diet for gut health.      First restrict alcohol, caffeine, spicy foods and other common food triggers (wheat and dairy). Focus should be on eating whole, unprocessed real foods in their unprocessed forms such as fruits, vegetables, whole grains (prefer wheat/gluten free), nuts, extra virgin olive oil, herbs, moderate amounts of plant based proteins, and healthy fats. Monitor how you handle nightshades, corn, soy as these can be common triggers for digestive issues.     A low-fodmap diet is complex and should be tailored to your individual symptoms. Moving through the following stages over 4-12 weeks to provide more awareness as to what foods could be triggering symptoms.   Some people notice an improvement in symptoms in the first week, while others take the full eight - twelve weeks. Once you have adequate relief of your digestive symptoms, you can progress to the second stage.  If by eight to twelve weeks your gut symptoms have not resolved you should check for hidden sources of fodmaps, try probiotic/digestive enzymes and consider life  "stressors as stress is a major contributor.     The diet can be difficult to follow if you are not prepared. Here are some tips:  Try avoiding just wheat by going gluten free or taking out dairy first while you prepare to make other dietary changes to follow a low-FODMAP diet. Eliminating these two foods first and finding alternatives can make you feel less restricted before you begin to take out other foods.   Find out what to buy: Ensure you have access to credible low-FODMAP food lists. See handouts of where to find these.  Get rid of high-FODMAP foods: Clear your fridge and pantry of these foods.  Make a shopping list: Create a low-FODMAP shopping list before heading to the grocery store, so you know which foods to purchase or avoid.  Read menus in advance: Familiarize yourself with low-FODMAP menu options so you'll be prepared when dining out.    Stage 2: Reintroduction    This stage involves systematically reintroducing high-FODMAP foods. The purpose of this is twofold:   To identify which types of FODMAPs you tolerate. Few people are sensitive to all of them.  To establish the amount of FODMAPs you can tolerate. This is known as your \"threshold level.\"    In this step, you test specific foods one by one for three days each.  It is recommended that you undertake this step with a trained dietitian who can guide you through the appropriate foods. Alternatively, this ahsan can help you identify which foods to reintroduce. https://www.[employer]/get-ahsan-help/    It is worth noting that you need to continue a low-FODMAP diet throughout this stage. This means even if you can tolerate a certain high-FODMAP food, you must continue to restrict it until stage 3.  It is also important to remember that, unlike people with most food allergies, people with IBS can tolerate small amounts of FODMAPs.     Stage 3: Personalization  This stage is also known as the \"modified low-FODMAP diet.\" In other words, you still " restrict some FODMAPs. However, the amount and type are tailored to your personal tolerance, identified in stage 2.  It is important to progress to this final stage in order to address nutrient deficiencies and increase diet variety and flexibility.    This step is aimed to relax dietary restrictions as much as possible, expand the variety of foods included in your diet and establish a  personalized FODMAP diet  for the long-term. In this step well tolerated foods and FODMAPs are reintroduced to your diet, while poorly tolerated foods and FODMAPs are restricted, but only to a level that provides symptom relief. We recommend that you repeat challenges of poorly tolerated foods and FODMAPs over time to see whether your tolerance changes. You can also use the Filter function in the [employer] FODMAP Hilda to personalize your hilda experience during Step 3 of the diet.      Foods high in FODMAPs  Here is a list of some common foods and ingredients that are high in FODMAPs:    Fruits: apples, applesauce, apricots, blackberries, boysenberries, canned fruit, cherries, dates, figs, peaches, pears, watermelon  Sweeteners: fructose, high fructose corn syrup, honey, maltitol, mannitol, sorbitol, xylitol  Dairy products: ice cream, milk (from cows, goats, and sheep), most yogurts, soft and fresh cheeses (cottage cheese, ricotta, etc.), sour cream, whey protein supplements  Vegetables: artichokes, asparagus, beetroot, broccoli, [place] sprouts, cabbage, cauliflower, fennel, garlic, leeks, mushrooms, okra, onions, peas, shallots  Legumes: beans, baked beans, chickpeas, lentils, red kidney beans, soybeans  Wheat: biscuits, bread, most breakfast cereals, crackers, pancakes, pasta, tortillas, waffles  Other grains: barley, rye  Beverages: beer, fortified ryan, fruit juices, milk, soft drinks with high fructose corn syrup, soy milk      Foods you can eat on a low FODMAP diet  Keep in mind that the purpose of this diet is not to completely  eliminate FODMAPs, which is extremely difficult. Simply minimizing these types of carbs is considered sufficient to reduce digestive symptoms.    There is a wide variety of healthy and nutritious foods that you can eat on a low FODMAP diet, including    Meats, fish, and eggs (well tolerated unless they have added high FODMAP ingredients, like wheat or high fructose corn syrup)  All fats and oils  Most herbs and spices  Nuts and seeds (including almonds, peanuts, macadamia nuts, pine nuts, and sesame seeds but not pistachios or cashews, which are high in FODMAPs)    Fruits, such as:  unripe bananas  blueberries  cantaloupe  grapefruit  grapes  kiwi  yancy  lime  mandarins  melons (except watermelon)  oranges  passionfruit  raspberries  strawberries  sweeteners (maple syrup, molasses, and stevia)  dairy products if they are lactose-free as well as hard cheeses and aged softer varieties (like Brie and Camembert)    Vegetables, such as:  alfalfa  bell peppers  bok flower  carrots  celery  chives  cucumbers  eggplant  elma  green beans  kale  lettuce  olives  parsnips  potatoes  radishes  spinach  spring onion (only green)  squash  sweet potatoes  tomatoes  turnips  water chestnuts  yams  zucchini    Grains, such as:  corn  oats  quinoa  rice  sorghum  tapioca  beverages (water, coffee, tea, etc)    However, keep in mind that these lists are neither definitive nor exhaustive. Naturally, there are foods not listed here that are either high or low in FODMAPs.     In addition, everyone is different. You may tolerate some foods on the list of foods to avoid while noticing digestive symptoms from foods low in FODMAPs for other reasons.  How much of a food you eat will affect how likely you are to experience symptoms if you have IBS. The individual tolerance to FODMAPs varies.      NUTRITION RESOURCES:  1. Core Food Plan   2.  [place] FODMAP Website, Hilda and Handout- 3 step FODMAP diet guide & food list  3. Low FODMAP Smoothie  Recipes         21 Day Low FODMAP Smoothie Challenge (21 Day Low FODMAP Challenges Book 1) Gagan Edition by Aria Bedoya        Low FODMAP Smoothies Recipe Book: A Beginners Guide to Low FODMAP Smoothies for Gut Health  4. The Low-FODMAP Diet for Beginners: A 7-Day Plan to Beat Bloat and Soothe Your Gut with Recipes for Fast IBS Relief by Edie Mcginnis  (Author), Patricia Choi RDN-[facility]  (Author), Regulo Najera MD PhD         PATIENT'S BEHAVIOR CHANGE GOALS:   See nutrition intervention for patient stated behavior change goals.       MONITOR / EVALUATE:  Registered Dietitian will monitor/evaluate the following:   Beliefs and attitudes related to food  Food and nutrition knowledge / skills  Food / Beverage / Nutrient intake   Pertinent Labs  Progress toward meeting stated nutrition-related goals  Readiness to change nutrition-related behaviors  Weight change  Digestion     COORDINATION OF CARE:  Follow up with gastroenterology      FOLLOW-UP:  Follow up with RD on Oct 20th at 2pm video visit       Time spent in minutes: 75 minutes 5 units   Encounter: Individual    Aurora Briseno RD, CLT, LD  Integrative Registered Dietitian

## 2023-09-07 LAB — IGA SERPL-MCNC: 154 MG/DL (ref 84–499)

## 2023-09-07 PROCEDURE — 83993 ASSAY FOR CALPROTECTIN FECAL: CPT

## 2023-09-07 PROCEDURE — 87507 IADNA-DNA/RNA PROBE TQ 12-25: CPT

## 2023-09-08 LAB
ADV 40+41 DNA STL QL NAA+NON-PROBE: NEGATIVE
ASTRO TYP 1-8 RNA STL QL NAA+NON-PROBE: NEGATIVE
C CAYETANENSIS DNA STL QL NAA+NON-PROBE: NEGATIVE
CAMPYLOBACTER DNA SPEC NAA+PROBE: NEGATIVE
CRYPTOSP DNA STL QL NAA+NON-PROBE: POSITIVE
E COLI O157 DNA STL QL NAA+NON-PROBE: ABNORMAL
E HISTOLYT DNA STL QL NAA+NON-PROBE: NEGATIVE
EAEC ASTA GENE ISLT QL NAA+PROBE: POSITIVE
EC STX1+STX2 GENES STL QL NAA+NON-PROBE: NEGATIVE
EPEC EAE GENE STL QL NAA+NON-PROBE: NEGATIVE
ETEC LTA+ST1A+ST1B TOX ST NAA+NON-PROBE: NEGATIVE
G LAMBLIA DNA STL QL NAA+NON-PROBE: NEGATIVE
NOROVIRUS GI+II RNA STL QL NAA+NON-PROBE: NEGATIVE
P SHIGELLOIDES DNA STL QL NAA+NON-PROBE: NEGATIVE
RVA RNA STL QL NAA+NON-PROBE: NEGATIVE
SALMONELLA SP RPOD STL QL NAA+PROBE: NEGATIVE
SAPO I+II+IV+V RNA STL QL NAA+NON-PROBE: NEGATIVE
SHIGELLA SP+EIEC IPAH ST NAA+NON-PROBE: NEGATIVE
TTG IGA SER-ACNC: 0.2 U/ML
TTG IGG SER-ACNC: <0.6 U/ML
V CHOLERAE DNA SPEC QL NAA+PROBE: NEGATIVE
VIBRIO DNA SPEC NAA+PROBE: NEGATIVE
Y ENTEROCOL DNA STL QL NAA+PROBE: NEGATIVE

## 2023-09-08 NOTE — PATIENT INSTRUCTIONS
NUTRITION INTERVENTION:  Low FODMAP Elimination and Reintroduction Diet     Long Term Goals:   Goal: 1-2 bowl movement daily [place] Stool Type 4 soft and formed  Goal: Decrease digestive symptoms -loose stool, gas, bloating, and constipation       Short Term Goals:  Goal 1: Cut back on wheat/gluten and dairy. Focus on reviewing high FODMAP foods (info below and handout provided) specifically, onion, garlic, legums/beans, nuts - almonds, coconut milk, cashews to see if they trigger symptoms.   .      Goal 2: Focus on starting a low fodmap smoothie for breakfast with focus on adding in adequate fiber. See recipes provided and try to add in some collagen protein to the smoothies that you already have on hand.      Also, check out some of the other breakfast ideas provided such as oatmeal (naturally gluten free) with dairy free alternative milk, flax seed, nuts such as walnuts, fruit such as berries and protein such as chicken/turkey sausage.     Try an 1/8 avocado or 1 tbsp of nut butter ex peanut butter on slice of gluten or sourdough bread - peanuts are low in fodmaps but a legume so monitor if trigger symptoms. Can also try some other nut butters such as almond or sunflower these are higher fodmap foods but important for you to identify if they trigger symptoms.         Goal 3:  Try to add in a snack at least 1-2x per day with focus on adding in extra fiber. Meal and snack planning can be challenging at the best of times, particularly for those of us following a low FODMAP diet. Snacks are an important part of meal planning, as they can help to fill the gaps nutritionally i.e. snacking on yoghurt/cheese and biscuits to top up calcium intake for the day. However, for some people and at certain times of day (e.g. in between work and dinner), snacking can become problematic not just from a FODMAP point of view, but also in regards to calorie intake and general healthy eating. Here are some ohealthy low FODMAP snack  ideas. It is important that also monitor how you handle dairy from cheese for example it could trigger symptoms even if a low fodmap.     Tips and tricks:   Mix and match your food groups - adding a source of protein to your snacks will keep you feeling kinsey for longer. E.g. peanut butter on rice cakes or gluten free toast, yoghurt & fruit, cheese & crackers.- try [employer] Chips - monitor if the almond flour triggers symptoms as almonds are higher fodmap sometimes you can consume small portions and still be ok and or try Siete Chips as they are gluten/wheat free.     Get organised at the start of the week - although meal prepping can be laborious, you will thank yourself for putting some extra time aside for snacks. See our snacks that require preparation below.      Have food handy for when you are on the move - you might have pre-packaged snacks ready in your bag or drawer at work (e.g. pack of mixed nuts, fruit or even jar of brazil nuts), or in the fridge at home (e.g. chopped up vegetables and dips).     Go for dairy - a low FODMAP diet does not mean a low dairy diet! Dairy is a great source of protein, calcium, and other vitamins/minerals with plenty of suitable lactose-free options available for those following a lactose-free diet. On-the-go pouch yoghurts (look for low sugar and monitor symptoms), as well as cheese & crackers are excellent choices for those who are busy and might find themselves snacking on the move.     Boost your fibre intake: eating fibre containing foods helps us to feel kinsey for longer. Keep the peels on the low FODMAP fruits and vegetables, sprinkle extra seeds (i.e. ayaan or kee) on your yoghurt, and consider wholegrain varieties where possible when choosing low FODMAP breads.     Consider your reusable food containers - gone are the days of simply taking a piece of fruit as a snack. These days, food storage containers come in a variety of shapes and sizes for all sorts of  tasty snacks. This can be particularly helpful when preparing meals and snacks the night before a busy work or school day e.g. squeeze pouches for yoghurt if tolerated (now there is almond and coconut dairy free varieties) or containers with separate compartments for dip and vegetables.     Snacks that require preparation:   Smoothies: green, summer berry, ariane peanut butter   Sweet: peanut butter bars, muesli bars, mixed berry & yoghurt granola bar  Savoury: savoury slice, vegetable muffins, rice paper roll, zucchini and rice slice  Energy balls: Oat & ariane chip, peanut butter protein balls, double ariane bliss balls    Snacks with minimal preparation:  Protein: Boiled eggs, tuna can, 20g mixed nuts or 10 almonds  Fruit: kiwi fruit, pineapple, mandarin, orange, firm banana, rockmelon/cantaloupe  Dairy & alternatives: yoghurt (lactose-free), hot chocolate made with drinking chocolate or latte made with lactose-free if required or soy milk (made from soy protein), cheese & crackers  Vegetables: vegetables with dip, pre-made sweet potato chips (baked with olive oil and a sprinkle of spices)  Gluten free toast or rice crackers with toppings: peanut butter, cheese, marmalade     Goal 4: Tracking what you eat. Writing down or tracking through an ahsan what you eat as well as how you feel can help you identify patterns in your symptoms. This can help you become more aware and create a diet that is right for you without over restricting.     mysymptoms ahsan, you can track symptoms, bowl movements, medications, stress, exercise, sleep and foods as well as beverages to become more mindful. [email or web address]    Goal 5: Start probiotic lactic acid based by ruthy duarte MD either the 30 or 100 billion for at least 30-90 days let me know how you tolerate as it could be beneficial to add a soil based such as the following   [email or web address].com/products/sbo-probiotics-ultimate    Start one muli by pure encapsulations 1  cap per day with food and womens omega 3 by nordic naturals 2 cap per day with food     Goal 6: Work on just meal planning check out [email or web address]     What are FODMAPs?  FODMAP stands for fermentable oligo-, di-, monosaccharides and polyols    These short-chain carbs are resistant to digestion. Instead of being absorbed into your bloodstream, they reach the far end of your intestine, where most of your gut bacteria reside.    Your gut bacteria then use these carbs for fuel, producing hydrogen gas and causing digestive symptoms in sensitive individuals. FODMAPs also draw liquid into your intestine, which may cause diarrhea.    Although not everyone has a sensitivity to FODMAPs, it is very common among people with irritable bowel syndrome (IBS)     Common FODMAPs include:    Fructose: a simple sugar found in many fruits and vegetables that also makes up the structure of table sugar and most added sugars  Lactose: a carbohydrate found in dairy products like milk  Fructans: found in many foods, including grains like wheat, spelt, rye and barley  Galactans: found in large amounts in legumes  Polyols: sugar alcohols like xylitol, sorbitol, maltitol, and mannitol. They are found in some fruits and vegetables and often used as sweetener      What is the purpose of a FODMAP diet?  A FODMAP diet is a 3 step diet used to help manage digestive symptoms.  Common gut problem with symptoms including abdominal (tummy) pain, bloating, wind (farting) and changes in bowel habit (diarrhea, constipation or both).     The aims of the diet are to:    Learn which foods and FODMAPs you tolerate, and which trigger your digestive symptoms. Understanding this will help you to follow a less restrictive, more nutritionally balanced diet for the long term that only restricts foods that trigger your digestive symptoms.    Assess whether your digestive symptoms are sensitive to FODMAPs. If you don't experience symptom improvement on the diet, than  you may need to consider other digestive therapies.      How to follow a FODMAP diet      Stage 1: Restriction/Elimination   This stage involves strict avoidance of all high-FODMAP foods. It's important to note you should NOT avoid all FODMAPs long-term, and this stage should only last about 4-12 weeks. This is because it's important to include FODMAPs in the diet for gut health.      First restrict alcohol, caffeine, spicy foods and other common food triggers (wheat and dairy). Focus should be on eating whole, unprocessed real foods in their unprocessed forms such as fruits, vegetables, whole grains (prefer wheat/gluten free), nuts, extra virgin olive oil, herbs, moderate amounts of plant based proteins, and healthy fats. Monitor how you handle nightshades, corn, soy as these can be common triggers for digestive issues.     A low-fodmap diet is complex and should be tailored to your individual symptoms. Moving through the following stages over 4-12 weeks to provide more awareness as to what foods could be triggering symptoms.   Some people notice an improvement in symptoms in the first week, while others take the full eight - twelve weeks. Once you have adequate relief of your digestive symptoms, you can progress to the second stage.  If by eight to twelve weeks your gut symptoms have not resolved you should check for hidden sources of fodmaps, try probiotic/digestive enzymes and consider life stressors as stress is a major contributor.     The diet can be difficult to follow if you are not prepared. Here are some tips:  Try avoiding just wheat by going gluten free or taking out dairy first while you prepare to make other dietary changes to follow a low-FODMAP diet. Eliminating these two foods first and finding alternatives can make you feel less restricted before you begin to take out other foods.   Find out what to buy: Ensure you have access to credible low-FODMAP food lists. See handouts of where to find  "these.  Get rid of high-FODMAP foods: Clear your fridge and pantry of these foods.  Make a shopping list: Create a low-FODMAP shopping list before heading to the grocery store, so you know which foods to purchase or avoid.  Read menus in advance: Familiarize yourself with low-FODMAP menu options so you'll be prepared when dining out.    Stage 2: Reintroduction    This stage involves systematically reintroducing high-FODMAP foods. The purpose of this is twofold:   To identify which types of FODMAPs you tolerate. Few people are sensitive to all of them.  To establish the amount of FODMAPs you can tolerate. This is known as your \"threshold level.\"    In this step, you test specific foods one by one for three days each.  It is recommended that you undertake this step with a trained dietitian who can guide you through the appropriate foods. Alternatively, this ahsan can help you identify which foods to reintroduce. https://www.[employer]/get-ahsan-help/    It is worth noting that you need to continue a low-FODMAP diet throughout this stage. This means even if you can tolerate a certain high-FODMAP food, you must continue to restrict it until stage 3.  It is also important to remember that, unlike people with most food allergies, people with IBS can tolerate small amounts of FODMAPs.     Stage 3: Personalization  This stage is also known as the \"modified low-FODMAP diet.\" In other words, you still restrict some FODMAPs. However, the amount and type are tailored to your personal tolerance, identified in stage 2.  It is important to progress to this final stage in order to address nutrient deficiencies and increase diet variety and flexibility.    This step is aimed to relax dietary restrictions as much as possible, expand the variety of foods included in your diet and establish a  personalized FODMAP diet  for the long-term. In this step well tolerated foods and FODMAPs are reintroduced to your diet, while poorly " tolerated foods and FODMAPs are restricted, but only to a level that provides symptom relief. We recommend that you repeat challenges of poorly tolerated foods and FODMAPs over time to see whether your tolerance changes. You can also use the Filter function in the [employer] FODMAP Hilda to personalize your hilda experience during Step 3 of the diet.      Foods high in FODMAPs  Here is a list of some common foods and ingredients that are high in FODMAPs:    Fruits: apples, applesauce, apricots, blackberries, boysenberries, canned fruit, cherries, dates, figs, peaches, pears, watermelon  Sweeteners: fructose, high fructose corn syrup, honey, maltitol, mannitol, sorbitol, xylitol  Dairy products: ice cream, milk (from cows, goats, and sheep), most yogurts, soft and fresh cheeses (cottage cheese, ricotta, etc.), sour cream, whey protein supplements  Vegetables: artichokes, asparagus, beetroot, broccoli, [place] sprouts, cabbage, cauliflower, fennel, garlic, leeks, mushrooms, okra, onions, peas, shallots  Legumes: beans, baked beans, chickpeas, lentils, red kidney beans, soybeans  Wheat: biscuits, bread, most breakfast cereals, crackers, pancakes, pasta, tortillas, waffles  Other grains: barley, rye  Beverages: beer, fortified ryan, fruit juices, milk, soft drinks with high fructose corn syrup, soy milk      Foods you can eat on a low FODMAP diet  Keep in mind that the purpose of this diet is not to completely eliminate FODMAPs, which is extremely difficult. Simply minimizing these types of carbs is considered sufficient to reduce digestive symptoms.    There is a wide variety of healthy and nutritious foods that you can eat on a low FODMAP diet, including    Meats, fish, and eggs (well tolerated unless they have added high FODMAP ingredients, like wheat or high fructose corn syrup)  All fats and oils  Most herbs and spices  Nuts and seeds (including almonds, peanuts, macadamia nuts, pine nuts, and sesame seeds but not  pistachios or cashews, which are high in FODMAPs)    Fruits, such as:  unripe bananas  blueberries  cantaloupe  grapefruit  grapes  kiwi  yancy  lime  mandarins  melons (except watermelon)  oranges  passionfruit  raspberries  strawberries  sweeteners (maple syrup, molasses, and stevia)  dairy products if they are lactose-free as well as hard cheeses and aged softer varieties (like Brie and Camembert)    Vegetables, such as:  alfalfa  bell peppers  bok flower  carrots  celery  chives  cucumbers  eggplant  elma  green beans  kale  lettuce  olives  parsnips  potatoes  radishes  spinach  spring onion (only green)  squash  sweet potatoes  tomatoes  turnips  water chestnuts  yams  zucchini    Grains, such as:  corn  oats  quinoa  rice  sorghum  tapioca  beverages (water, coffee, tea, etc)    However, keep in mind that these lists are neither definitive nor exhaustive. Naturally, there are foods not listed here that are either high or low in FODMAPs.     In addition, everyone is different. You may tolerate some foods on the list of foods to avoid while noticing digestive symptoms from foods low in FODMAPs for other reasons.  How much of a food you eat will affect how likely you are to experience symptoms if you have IBS. The individual tolerance to FODMAPs varies.      NUTRITION RESOURCES:  1. Core Food Plan   2.  [place] FODMAP Website, Hilda and Handout- 3 step FODMAP diet guide & food list  3. Low FODMAP Smoothie Recipes         21 Day Low FODMAP Smoothie Challenge (21 Day Low FODMAP Challenges Book 1) Gagan Edition by Aria Bedoya        Low FODMAP Smoothies Recipe Book: A Beginners Guide to Low FODMAP Smoothies for Gut Health  4. The Low-FODMAP Diet for Beginners: A 7-Day Plan to Beat Bloat and Soothe Your Gut with Recipes for Fast IBS Relief by Edie Mcginnis  (Author), Patricia SMITH [facility]  (Author), Regulo Najera MD PhD

## 2023-09-12 LAB — CALPROTECTIN STL-MCNT: 8.3 MG/KG (ref 0–49.9)

## 2023-09-25 ENCOUNTER — MYC MEDICAL ADVICE (OUTPATIENT)
Dept: OBGYN | Facility: CLINIC | Age: 23
End: 2023-09-25
Payer: COMMERCIAL

## 2023-10-20 ENCOUNTER — VIRTUAL VISIT (OUTPATIENT)
Dept: NUTRITION | Facility: CLINIC | Age: 23
End: 2023-10-20
Payer: COMMERCIAL

## 2023-10-20 DIAGNOSIS — R53.83 FATIGUE, UNSPECIFIED TYPE: ICD-10-CM

## 2023-10-20 DIAGNOSIS — K59.09 CHRONIC CONSTIPATION: Primary | ICD-10-CM

## 2023-10-20 PROCEDURE — 97803 MED NUTRITION INDIV SUBSEQ: CPT | Mod: VID | Performed by: DIETITIAN, REGISTERED

## 2023-10-20 NOTE — PROGRESS NOTES
Medical Nutrition Therapy  Visit Type: Reassessment and intervention    Visit Details    How would you like to obtain your AVS? MyChart  If the correspondence for visit is dropped, how would you like your dietitian to reconnect with you:   call back by phone? Yes    Text to cell phone: [phone]  Will anyone else be joining your video visit or telephone call? No  {If patient encounters technical issues they should call [phone]    Type of service:  Video Visit   Start Time:  2:12pm    End Time:3:15 PM    Originating Location (pt. Location): Home    Distant Location (provider location):  [facility] - remote offsite   Platform used for Video Visit: Daisy      Referring Provider: Lee Ann Carbajal  [facility]     REASON FOR REFERRAL:   Arlen William is a 23 year old female who is interested in Medical Nutrition Therapy (MNT) and education related to Irritable bowel syndrome with both constipation and diarrhea   Fatigue, unspecified type   Chronic constipation     She is accompanied by self.       Changes since previous consult Yes        Behavior Status:Improvement shown  Barriers Include:Lack of nutrition knowlege and consistency     Goals: What would you like to work on that will help you most over the next several weeks? Get supplements and try smoothie, track food triggers.      Has been more mindful now of what she is putting into her body. She has had to make some small changes. Trying to swap out lower fodmap foods wants to try more smoothies but needs to get the funds for  and supplements. She has had to keep it more simple so hoping to be able to do more recipes that are low fodmap. Wants to focus more on meal prep now. She feels she has enough resources and recipes around FODMAPs. She has been looking for more low fodmap snack ideas and has been more aware of what she is buying when reading lables tends to do more gluten free and keto which has helped  stay away from wheat. She has been trying to find healthy resources at restaurants She hasn't been able to pinpoint exact foods that are low fodamp that are triggering symptoms but has noticed that her coffee has been triggering acid reflux. She has transitioned to black coffee and that doesn't seem trigger symptoms. Hasn't tried dairy free yet to see if a creamer triggers symptoms. Acid reflux usually triggers at night but unsure what she ate during the day that causes it, wants to work on tracking foods to see. Needs to start to cut back on spicy foods sand track if triggering symptoms. She has found that eating out fried foods triggered symptoms. She has more stomach ache and irregular Bms. She hasn't found that wheat is a trigger but does feel like dairy is a bigger trigger.     INITIAL NUTRITION ASSESSMENT:   Notes 9/6/23  She tends to struggle with bloating and stomach ache throughout the day and would love to start the day going to the gym but has a constant feeling of fatigue. Usually coffee helps her have Bms, she feels she needs it go to the bathroom. She usually has a BM daily. She is still struggling with alternating bowel movements between constipation or extreme diarrhea.  She does experience phlegm most mornings. She hasn't committed to cutting out dairy mostly cheese/cream cheese. She hasn't tried taking out wheat either however doesn't eat wheat from bread much.     9-10am Breakfast: will do oats rolled plain added water with fresh fruits and peanut butter with cinnamon and honey   Coffee with creamer or water or la croix  Eats lunch later anywhere from 1-3pm lighter bagel adds cream cheese   Usually more lazy for dinner which will be a bigger meal will eat later and but before gym around 5-7pm sometimes only eats half of dinner if eating before gym. She struggles with dinner the most will do a meat such as steak of chicken. She doesn't like carbs so doesn't eat often. She might have a lot of fruit  for dinner.         9/5/2023     3:41 PM   Nutritional Goals   Nutritional Goal Create healthier eating patterns;Work on meal planning/recipes;Manage Digestive Issues;Address nutrient deficiencies;Identify adverse food reactions or triggers;Increase energy             No data to display                        No data to display                   9/5/2023     3:41 PM   Gastrointestinal   Constipation Past;Current   Hemorrhoids Past   Gas/bloating Past   Gallstones Past   Abdominal Pain Past            No data to display                    No data to display                   9/5/2023     3:41 PM   Skin   Acne Past           No data to display                    No data to display                    No data to display                    No data to display                   9/5/2023     3:41 PM   Womens Health Assessment   Hysterectomy No   I am pregnant.  No   I am breastfeeding. No   I want to become pregnant within the next year . No   What do you use for contraception?  birth control pill   Any problems with current birth control method?   Yes   Date of last menstrual period 9/5/2023   Are your periods irregular? Yes   Irregular periods Bleed between periods   Days from the start of one period to the next. 18   Days of Menstral Flow 6   Associated with menstral periods. Painful/cramping;Heavy bleeding;Mood swings;Bloating;Breast tenderness;Diarrhea/constipation   Are you officially in menopause? (no period for one year or longer)  No        Past Medical History:  Past Medical History:   Diagnosis Date    Abnormal Pap smear of cervix 08/12/2020    see problem list    Asthma, mild persistent     Concussion 1/2012    Impact test 3/12    Mononucleosis 06/2019       Previous Surgeries:   Past Surgical History:   Procedure Laterality Date    ARTHROSCOPIC RECONSTRUCTION ANTERIOR CRUCIATE LIGAMENT  8/19/13    left    ESOPHAGOSCOPY, GASTROSCOPY, DUODENOSCOPY (EGD), COMBINED N/A 12/2/2016    Procedure: COMBINED  ESOPHAGOSCOPY, GASTROSCOPY, DUODENOSCOPY (EGD), BIOPSY SINGLE OR MULTIPLE;  Surgeon: Laurie Pride MD;  Location: [facility]     HC TOOTH EXTRACTION W/FORCEP          Family History:  Family History   Problem Relation Age of Onset    Diabetes Mother         gestational     GERD Mother     Gallbladder Disease Mother     Diabetes Maternal Grandmother     Cancer Maternal Grandmother         ovarian and uterine    Hypertension Father     Lipids Father     Hypertension Paternal Grandfather     Family History Negative No family hx of     Asthma No family hx of         Eosinophilic esophagitis.    C.A.D. No family hx of     Breast Cancer No family hx of     Cerebrovascular Disease No family hx of     Cancer - colorectal No family hx of     Prostate Cancer No family hx of     Alcohol/Drug No family hx of     Allergies No family hx of     Alzheimer Disease No family hx of     Anesthesia Reaction No family hx of     Arthritis No family hx of     Blood Disease No family hx of     Cardiovascular No family hx of     Circulatory No family hx of     Congenital Anomalies No family hx of     Connective Tissue Disorder No family hx of     Depression No family hx of     Endocrine Disease No family hx of     Eye Disorder No family hx of     Genetic Disorder No family hx of     Gastrointestinal Disease No family hx of     Genitourinary Problems No family hx of     Gynecology No family hx of     Musculoskeletal Disorder No family hx of     Anxiety Disorder No family hx of     Mental Illness No family hx of     Substance Abuse No family hx of     Colon Cancer No family hx of     Other Cancer No family hx of     Thyroid Disease No family hx of     Obesity No family hx of     Osteoporosis No family hx of     Unknown/Adopted No family hx of     Hyperlipidemia No family hx of     Coronary Artery Disease No family hx of     Other - See Comments No family hx of         Lifestyle History:      9/5/2023     3:41 PM    Lifestyle   Do you feel your life is stressful right now?  Yes   What is the cause(s) of stress in your life?  Work;Health Concerns;Multi-tasking and multiple deadlines to meet   Are you currently implementing any strategies to help manage stress? Yes   What are you doing to manage stress?  Counseling;Journaling;Take time for self;Reading        Exercise History:      9/5/2023     3:41 PM   Exercise   Does your occupation require extended periods of sitting?  Yes   Does your occupation require extended periods of repetitive movements (ex: walking or lifting)?  No   Do you currently participate in any forms of exercise? Yes   Check all the exercises you participate in: Walking;Weight Lifting   How many times per week do you exercise? 3 times   How long do you usually exercise? 60 min        Sleep History:      9/5/2023     3:41 PM   Sleep   How many hours (on average) do you sleep per night? 7-9   What time do you turn off the lights? 10 PM   How long does it take for you to fall asleep? 15-20 mins   What time do you stop using electronic devices? 10 PM   What time do you wake up? 6 AM   When do you eat your first meal?  10 AM   Do you feel well-rested during the day?  No   Do you take naps?  No   Do you have a comfortable bedroom environment (cool, quiet, dark, etc)? Yes   Do you have a sleep routine/ ritual that you do before bed?  Yes   How many hours do you spend per day looking at a screen (TV, computer, tablet and phone)? 7 to 9   Select all factors that apply to your current sleep habits: Have difficulty waking up in the morning;Feel tired/sluggish/fatigued during the day;Have tried supplements (ie: melatonin) for sleep        Nutrition History:      9/5/2023     3:41 PM   Nutrition   Have you ever had a nutrition consultation? No   Do you currently follow a special diet or nutritional program? No   What do you feel are the biggest barriers getting in the way of achieving you nutritional goals?  Motivation/Readiness to change;Lack of time;Work (such as lack of time to eat, unhealthy choices, etc.);Lack of nutrition knowledge;Stress   Do you have any food allergies, sensitivities or intolerances?  No           9/5/2023     3:41 PM   Digestion   Do you experience stomach pains/cramping? Rarely   Do you experience bloating?  Daily   Do you experience gas?  Weekly   Do you experience heartburn/acid reflux/indigestion? 2-3 times per week   How often do you have a bowel movement? 1 time per day   What is a typical bowel movement like for you? Select all that apply: Hard to pass;Varies a lot;Loose;Liquid;Formed and soft;Alternates between loose and hard;Mucous, greasy          9/5/2023     3:41 PM   Food Access:    Who does the grocery shopping? Self   How often is grocery shopping done? Weekly   Where do you usually receive your groceries from? Select all that apply: [employer]   Do you read food labels? No   Who does the cooking? Select all that apply: Self   How many meals do you eat out per week?  3 to 5   What restaurants do you typically choose? Sit-down          9/5/2023     3:41 PM   Daily Patterns:   How many days per week do you have breakfast? 6   How many days per week do you have lunch? 5   How many days per week do you have dinner? 5   How many days per week do you have snacks? 2          9/5/2023     3:41 PM   Protein Intake:   How many times per day do you typically consume a protein source(s)? 2   What types of protein do you currently eat?  Steak;Other Beef;Chicken Breast;Deli Turkey;Eggs           9/5/2023     3:41 PM   Fat Intake:    How many times per day do you typically consume healthy fat(s)? 1   What types of health fats do you currently eat? Select all that apply:  Cashews;Peanut butter;Avocado oil           9/5/2023     3:41 PM   Fruit Intake:    How many times per day do you typically consume fruits? 2   What types of fruit do currently eat?  Banana;Blackberries;Blueberries;Cantaloupe;Grapes;Mandarin oranges;[place];Melon;Pineapple;Raspberries;Watermelon;Other           9/5/2023     3:41 PM   Vegetable Intake:    How many times per day do you typically consume vegetables? 1   What types of vegetables do you currently eat? Potato (baked, boiled, mashed, French fries);Spinach;Sugar snap peas;Tomato          9/5/2023     3:41 PM   Grain Intake:    How many times per day do you typically consume grains? 2   What types of grains do currently eat? Select all that apply:  Oats (gluten free);Bagel (non-gluten free);Breads (non-gluten free)           9/5/2023     3:41 PM   Dairy Intake:    How many times per day do you typically consume dairy? 2   What types of dairy do currently eat? Select all that apply:  Milk;Cheese;Yogurt           9/5/2023     3:41 PM   Non-Dairy Alternative Intake:    How many times per day do you typically consume non-dairy alternatives? 0   What types of non-dairy alternatives do currently eat? Select all that apply:  Goat cheese           9/5/2023     3:41 PM   Sweets Intake:    How many times per day do you typically consume sweets? 1          9/5/2023     3:41 PM   Beverage Intake:    How many 8 oz cups of water do you typically consume per day?  6 to 7   How many 8 oz cups of caffeine do you typically consume per day?  1 to 2   How many drinks of alcohol do you typically consume per week (1 drink = 5 oz wine, 12 oz beer, 1.5 oz spirits)?   1 to 3           9/5/2023     3:41 PM   Lifestyle Recall:    What time did you wake up? 8 AM   What time did you go to sleep? 11 PM   What time did you have breakfast? 9-10 AM   Where did you have breakfast?  Home   What time did you have a morning snack? No snack   What time did you have lunch? 1-2 PM   Where did you have lunch?  Home   What time did you have an afternoon snack? No snack   What time did you have dinner? 6-7 PM   Where did you have dinner?  Home   What time did you have an evening  "snack? No Snack   What time of day did you exercise? 4 PM   Where did you exercise? Gym/Fitness Center          Additional concerns:          MEDICATIONS:  Current Outpatient Medications   Medication Sig Dispense Refill    Biotin 10 MG CAPS  (Patient not taking: Reported on 8/7/2023)      Cholecalciferol (VITAMIN D) 1000 UNITS capsule Take 1 capsule by mouth daily. (Patient not taking: Reported on 8/7/2023)      metroNIDAZOLE (METROGEL) 0.75 % vaginal gel Apply vaginally twice weekly for 4-6 months. 70 g 11    Multiple Vitamin (MULTIVITAMINS PO) Take 1 tablet by mouth daily.      norgestimate-ethinyl estradiol (ORTHO-CYCLEN) 0.25-35 MG-MCG tablet Take 1 tablet by mouth daily 84 tablet 4    omeprazole (PRILOSEC) 40 MG DR capsule Take 1 capsule (40 mg) by mouth every morning On an empty stomach about 30 minutes before breakfast 90 capsule 0            No data to display                  ALLERGIES:   No Known Allergies     .na  LABS:  Last Basic Metabolic Panel:  Lab Results   Component Value Date     07/19/2023     10/10/2022      Lab Results   Component Value Date    POTASSIUM 4.7 07/19/2023    POTASSIUM 4.0 10/10/2022     Lab Results   Component Value Date    CHLORIDE 108 07/19/2023    CHLORIDE 107 10/10/2022     Lab Results   Component Value Date    ALVIN 9.7 07/19/2023    ALVIN 9.0 10/10/2022     Lab Results   Component Value Date    CO2 27 07/19/2023    CO2 28 10/10/2022     Lab Results   Component Value Date    BUN 12.4 07/19/2023    BUN 9 10/10/2022     Lab Results   Component Value Date    CR 0.77 07/19/2023    CR 0.69 10/10/2022     Lab Results   Component Value Date    GLC 85 07/19/2023    GLC 89 10/10/2022       Last Glucose Profile:   No results found for: \"A1C\"    Last Lipid Profile:   No results found for: \"CHOL\"  No results found for: \"HDL\"  No results found for: \"LDL\"  No results found for: \"TRIG\"  No results found for: \"CHOLHDLRATIO\"    Most recent CBC:  Recent Labs   Lab Test 07/19/23  1205 " "10/10/22  0822 03/08/22  1558   WBC 7.0 11.3* 9.7   HGB 13.6 13.6 13.1   HCT 42.4 41.0 40.0    296 303     Most recent hepatic panel:  Recent Labs   Lab Test 07/19/23  1205 10/10/22  0822   ALT 22 31   AST 24 21     Most recent creatinine:  Recent Labs   Lab Test 07/19/23  1205 10/10/22  0822   CR 0.77 0.69       No components found for: \"GFRESETIMATED\"LASTLAB(gfrestblack:1@  Lab Results   Component Value Date    ALBUMIN 4.6 07/19/2023    ALBUMIN 3.4 10/10/2022       Last Thyroid Profile:   TSH   Date Value Ref Range Status   07/19/2023 2.37 0.30 - 4.20 uIU/mL Final     Free T4   Date Value Ref Range Status   07/19/2023 1.26 0.90 - 1.70 ng/dL Final       Last Mineral Profile:   Ferritin   Date Value Ref Range Status   09/06/2023 23 6 - 175 ng/mL Final     Iron   Date Value Ref Range Status   09/06/2023 50 37 - 145 ug/dL Final     Iron Binding Capacity   Date Value Ref Range Status   09/06/2023 387 240 - 430 ug/dL Final       Autoimmune & Inflammatory   CRP Inflammation   Date Value Ref Range Status   03/08/2022 <2.9 0.0 - 8.0 mg/L Final         Last Vitamin Profile:   25 OH Vit D2   Date Value Ref Range Status   03/07/2012 <5 ug/L Final     25 OH Vit D3   Date Value Ref Range Status   03/07/2012 25 ug/L Final     25 OH Vit D total   Date Value Ref Range Status   03/07/2012  ug/L Final    <30  Season, race, dietary intake, and treatment affect the concentration of   25-hydroxy-Vitamin D. Values may decrease during winter months and increase   during summer months. Values less than 30 ug/L may indicate Vitamin D   deficiency.       ANTHROPOMETRICS:  Vitals:   BP Readings from Last 1 Encounters:   06/16/23 (!) 131/90     Pulse Readings from Last 1 Encounters:   06/16/23 86     Estimated body mass index is 24.18 kg/m  as calculated from the following:    Height as of 12/21/22: 1.727 m (5' 8\").    Weight as of 6/16/23: 72.1 kg (159 lb).    Wt Readings from Last 5 Encounters:   06/16/23 72.1 kg (159 lb)   12/21/22 " 72.6 kg (160 lb)   12/06/22 71.7 kg (158 lb)   12/01/22 73 kg (161 lb)   10/10/22 73 kg (161 lb)         NUTRITION DIAGNOSIS:  1. Excessive intake of high FODMAP foods related to food and nutrition knowledge deficit regarding effect of FODMAPs, as evidenced by symptoms of increased bloating, gas, soft and loose stools back to constipation again.     2. Inadequate fiber intake related to food and nutrition knowledge deficit regarding desirable quantities of fiber, as evidenced by bloating, gas, soft and loose stools and constipation.         NUTRITION INTERVENTION:  Low FODMAP Elimination and Reintroduction Diet     Long Term Goals:   Goal: 1-2 bowl movement daily Cyclone Stool Type 4 soft and formed  Goal: Decrease digestive symptoms -loose stool, gas, bloating, and constipation       Short Term Goals:  Goal 1: Cut back on wheat/gluten and dairy. Focus on reviewing high FODMAP foods (info below and handout provided) specifically, onion, garlic, legums/beans, nuts - almonds, coconut milk, cashews to see if they trigger symptoms.   .      Goal 2: Needs Improvement: Focus on starting a low fodmap smoothie for breakfast with focus on adding in adequate fiber. See recipes provided and try to add in some collagen protein to the smoothies that you already have on hand.      Also, check out some of the other breakfast ideas provided such as oatmeal (naturally gluten free) with dairy free alternative milk, flax seed, nuts such as walnuts, fruit such as berries and protein such as chicken/turkey sausage.     Try an 1/8 avocado or 1 tbsp of nut butter ex peanut butter on slice of gluten or sourdough bread - peanuts are low in fodmaps but a legume so monitor if trigger symptoms. Can also try some other nut butters such as almond or sunflower these are higher fodmap foods but important for you to identify if they trigger symptoms.         Goal 3: Improvement Shown:  Try to add in a snack at least 1-2x per day with focus on  adding in extra fiber. Meal and snack planning can be challenging at the best of times, particularly for those of us following a low FODMAP diet. Snacks are an important part of meal planning, as they can help to fill the gaps nutritionally i.e. snacking on yoghurt/cheese and biscuits to top up calcium intake for the day. However, for some people and at certain times of day (e.g. in between work and dinner), snacking can become problematic not just from a FODMAP point of view, but also in regards to calorie intake and general healthy eating. Here are some ohealthy low FODMAP snack ideas. It is important that also monitor how you handle dairy from cheese for example it could trigger symptoms even if a low fodmap.     Tips and tricks:   Mix and match your food groups - adding a source of protein to your snacks will keep you feeling kinsey for longer. E.g. peanut butter on rice cakes or gluten free toast, yoghurt & fruit, cheese & crackers.- try [employer] Chips - monitor if the almond flour triggers symptoms as almonds are higher fodmap sometimes you can consume small portions and still be ok and or try Siete Chips as they are gluten/wheat free.     Get organised at the start of the week - although meal prepping can be laborious, you will thank yourself for putting some extra time aside for snacks. See our snacks that require preparation below.      Have food handy for when you are on the move - you might have pre-packaged snacks ready in your bag or drawer at work (e.g. pack of mixed nuts, fruit or even jar of brazil nuts), or in the fridge at home (e.g. chopped up vegetables and dips).     Go for dairy - a low FODMAP diet does not mean a low dairy diet! Dairy is a great source of protein, calcium, and other vitamins/minerals with plenty of suitable lactose-free options available for those following a lactose-free diet. On-the-go pouch yoghurts (look for low sugar and monitor symptoms), as well as cheese & crackers  are excellent choices for those who are busy and might find themselves snacking on the move.     Boost your fibre intake: eating fibre containing foods helps us to feel kinsey for longer. Keep the peels on the low FODMAP fruits and vegetables, sprinkle extra seeds (i.e. ayaan or kee) on your yoghurt, and consider wholegrain varieties where possible when choosing low FODMAP breads.     Consider your reusable food containers - gone are the days of simply taking a piece of fruit as a snack. These days, food storage containers come in a variety of shapes and sizes for all sorts of tasty snacks. This can be particularly helpful when preparing meals and snacks the night before a busy work or school day e.g. squeeze pouches for yoghurt if tolerated (now there is almond and coconut dairy free varieties) or containers with separate compartments for dip and vegetables.     Snacks that require preparation:   Smoothies: green, summer berry, ariane peanut butter   Sweet: peanut butter bars, muesli bars, mixed berry & yoghurt granola bar  Savoury: savoury slice, vegetable muffins, rice paper roll, zucchini and rice slice  Energy balls: Oat & ariane chip, peanut butter protein balls, double ariane bliss balls    Snacks with minimal preparation:  Protein: Boiled eggs, tuna can, 20g mixed nuts or 10 almonds  Fruit: kiwi fruit, pineapple, mandarin, orange, firm banana, rockmelon/cantaloupe  Dairy & alternatives: yoghurt (lactose-free), hot chocolate made with drinking chocolate or latte made with lactose-free if required or soy milk (made from soy protein), cheese & crackers  Vegetables: vegetables with dip, pre-made sweet potato chips (baked with olive oil and a sprinkle of spices)  Gluten free toast or rice crackers with toppings: peanut butter, cheese, marmalade     Goal 4: Needs Improvement: Tracking what you eat. Writing down or tracking through an ahsan what you eat as well as how you feel can help you identify patterns in your  symptoms. This can help you become more aware and create a diet that is right for you without over restricting.     mysymptoms ahsan, you can track symptoms, bowl movements, medications, stress, exercise, sleep and foods as well as beverages to become more mindful. [email or web address]    Goal 5: Needs Improvement: Start probiotic lactic acid based by ruthy duarte MD either the 30 or 100 billion for at least 30-90 days let me know how you tolerate as it could be beneficial to add a soil based such as the following   [email or web address]-[email or web address]-ultimate    Start one muli by pure encapsulations 1 cap per day with food and womens omega 3 by [employer] 2 cap per day with food     Calm Magnesium plus calcium powder add to some chamomile tea before or even in the smoothie 1 tsp and increase to 250-400mg (nice to try in the future)     Start Pure Encapsulations Iron-C  Iron and Vitamin C Supplement to Support Muscle Function, Red Blood Cell Function, and Energy*  - 1 capsule daily with food in am with multivitamin     Or get the O.N.E. Multivitamin with Iron   Pure Encapsulations -    [employer] Evening Primrose oil - 2 caps daily with food (nice to try in the future)     Goal 6: Needs Improvement: Work on just meal planning check out [email or web address]     What are FODMAPs?  FODMAP stands for fermentable oligo-, di-, monosaccharides and polyols    These short-chain carbs are resistant to digestion. Instead of being absorbed into your bloodstream, they reach the far end of your intestine, where most of your gut bacteria reside.    Your gut bacteria then use these carbs for fuel, producing hydrogen gas and causing digestive symptoms in sensitive individuals. FODMAPs also draw liquid into your intestine, which may cause diarrhea.    Although not everyone has a sensitivity to FODMAPs, it is very common among people with irritable bowel syndrome (IBS)     Common FODMAPs include:    Fructose:  a simple sugar found in many fruits and vegetables that also makes up the structure of table sugar and most added sugars  Lactose: a carbohydrate found in dairy products like milk  Fructans: found in many foods, including grains like wheat, spelt, rye and barley  Galactans: found in large amounts in legumes  Polyols: sugar alcohols like xylitol, sorbitol, maltitol, and mannitol. They are found in some fruits and vegetables and often used as sweetener      What is the purpose of a FODMAP diet?  A FODMAP diet is a 3 step diet used to help manage digestive symptoms.  Common gut problem with symptoms including abdominal (tummy) pain, bloating, wind (farting) and changes in bowel habit (diarrhea, constipation or both).     The aims of the diet are to:    Learn which foods and FODMAPs you tolerate, and which trigger your digestive symptoms. Understanding this will help you to follow a less restrictive, more nutritionally balanced diet for the long term that only restricts foods that trigger your digestive symptoms.    Assess whether your digestive symptoms are sensitive to FODMAPs. If you don't experience symptom improvement on the diet, than you may need to consider other digestive therapies.      How to follow a FODMAP diet      Stage 1: Restriction/Elimination   This stage involves strict avoidance of all high-FODMAP foods. It's important to note you should NOT avoid all FODMAPs long-term, and this stage should only last about 4-12 weeks. This is because it's important to include FODMAPs in the diet for gut health.      First restrict alcohol, caffeine, spicy foods and other common food triggers (wheat and dairy). Focus should be on eating whole, unprocessed real foods in their unprocessed forms such as fruits, vegetables, whole grains (prefer wheat/gluten free), nuts, extra virgin olive oil, herbs, moderate amounts of plant based proteins, and healthy fats. Monitor how you handle nightshades, corn, soy as these can be  "common triggers for digestive issues.     A low-fodmap diet is complex and should be tailored to your individual symptoms. Moving through the following stages over 4-12 weeks to provide more awareness as to what foods could be triggering symptoms.   Some people notice an improvement in symptoms in the first week, while others take the full eight - twelve weeks. Once you have adequate relief of your digestive symptoms, you can progress to the second stage.  If by eight to twelve weeks your gut symptoms have not resolved you should check for hidden sources of fodmaps, try probiotic/digestive enzymes and consider life stressors as stress is a major contributor.     The diet can be difficult to follow if you are not prepared. Here are some tips:  Try avoiding just wheat by going gluten free or taking out dairy first while you prepare to make other dietary changes to follow a low-FODMAP diet. Eliminating these two foods first and finding alternatives can make you feel less restricted before you begin to take out other foods.   Find out what to buy: Ensure you have access to credible low-FODMAP food lists. See handouts of where to find these.  Get rid of high-FODMAP foods: Clear your fridge and pantry of these foods.  Make a shopping list: Create a low-FODMAP shopping list before heading to the grocery store, so you know which foods to purchase or avoid.  Read menus in advance: Familiarize yourself with low-FODMAP menu options so you'll be prepared when dining out.    Stage 2: Reintroduction    This stage involves systematically reintroducing high-FODMAP foods. The purpose of this is twofold:   To identify which types of FODMAPs you tolerate. Few people are sensitive to all of them.  To establish the amount of FODMAPs you can tolerate. This is known as your \"threshold level.\"    In this step, you test specific foods one by one for three days each.  It is recommended that you undertake this step with a trained dietitian " "who can guide you through the appropriate foods. Alternatively, this hilda can help you identify which foods to reintroduce. [email or web address]-hilda-help/    It is worth noting that you need to continue a low-FODMAP diet throughout this stage. This means even if you can tolerate a certain high-FODMAP food, you must continue to restrict it until stage 3.  It is also important to remember that, unlike people with most food allergies, people with IBS can tolerate small amounts of FODMAPs.     Stage 3: Personalization  This stage is also known as the \"modified low-FODMAP diet.\" In other words, you still restrict some FODMAPs. However, the amount and type are tailored to your personal tolerance, identified in stage 2.  It is important to progress to this final stage in order to address nutrient deficiencies and increase diet variety and flexibility.    This step is aimed to relax dietary restrictions as much as possible, expand the variety of foods included in your diet and establish a  personalized FODMAP diet  for the long-term. In this step well tolerated foods and FODMAPs are reintroduced to your diet, while poorly tolerated foods and FODMAPs are restricted, but only to a level that provides symptom relief. We recommend that you repeat challenges of poorly tolerated foods and FODMAPs over time to see whether your tolerance changes. You can also use the Filter function in the [employer] FODMAP Hilda to personalize your hilda experience during Step 3 of the diet.      Foods high in FODMAPs  Here is a list of some common foods and ingredients that are high in FODMAPs:    Fruits: apples, applesauce, apricots, blackberries, boysenberries, canned fruit, cherries, dates, figs, peaches, pears, watermelon  Sweeteners: fructose, high fructose corn syrup, honey, maltitol, mannitol, sorbitol, xylitol  Dairy products: ice cream, milk (from cows, goats, and sheep), most yogurts, soft and fresh cheeses (cottage cheese, ricotta, " etc.), sour cream, whey protein supplements  Vegetables: artichokes, asparagus, beetroot, broccoli, [place] sprouts, cabbage, cauliflower, fennel, garlic, leeks, mushrooms, okra, onions, peas, shallots  Legumes: beans, baked beans, chickpeas, lentils, red kidney beans, soybeans  Wheat: biscuits, bread, most breakfast cereals, crackers, pancakes, pasta, tortillas, waffles  Other grains: barley, rye  Beverages: beer, fortified ryan, fruit juices, milk, soft drinks with high fructose corn syrup, soy milk      Foods you can eat on a low FODMAP diet  Keep in mind that the purpose of this diet is not to completely eliminate FODMAPs, which is extremely difficult. Simply minimizing these types of carbs is considered sufficient to reduce digestive symptoms.    There is a wide variety of healthy and nutritious foods that you can eat on a low FODMAP diet, including    Meats, fish, and eggs (well tolerated unless they have added high FODMAP ingredients, like wheat or high fructose corn syrup)  All fats and oils  Most herbs and spices  Nuts and seeds (including almonds, peanuts, macadamia nuts, pine nuts, and sesame seeds but not pistachios or cashews, which are high in FODMAPs)    Fruits, such as:  unripe bananas  blueberries  cantaloupe  grapefruit  grapes  kiwi  yancy  lime  mandarins  melons (except watermelon)  oranges  passionfruit  raspberries  strawberries  sweeteners (maple syrup, molasses, and stevia)  dairy products if they are lactose-free as well as hard cheeses and aged softer varieties (like Brie and Camembert)    Vegetables, such as:  alfalfa  bell peppers  bok flower  carrots  celery  chives  cucumbers  eggplant  elma  green beans  kale  lettuce  olives  parsnips  potatoes  radishes  spinach  spring onion (only green)  squash  sweet potatoes  tomatoes  turnips  water chestnuts  yams  zucchini    Grains, such as:  corn  oats  quinoa  rice  sorghum  tapioca  beverages (water, coffee, tea, etc)    However, keep  in mind that these lists are neither definitive nor exhaustive. Naturally, there are foods not listed here that are either high or low in FODMAPs.     In addition, everyone is different. You may tolerate some foods on the list of foods to avoid while noticing digestive symptoms from foods low in FODMAPs for other reasons.  How much of a food you eat will affect how likely you are to experience symptoms if you have IBS. The individual tolerance to FODMAPs varies.      NUTRITION RESOURCES:  1. Core Food Plan   2.  [place] FODMAP Website, Hilda and Handout- 3 step FODMAP diet guide & food list  3. Low FODMAP Smoothie Recipes         21 Day Low FODMAP Smoothie Challenge (21 Day Low FODMAP Challenges Book 1) Gagan Edition by Aria Bedoya        Low FODMAP Smoothies Recipe Book: A Beginners Guide to Low FODMAP Smoothies for Gut Health  4. The Low-FODMAP Diet for Beginners: A 7-Day Plan to Beat Bloat and Soothe Your Gut with Recipes for Fast IBS Relief by Edie Mcginnis  (Author), Patricia Choi RDN-[facility]  (Author), Regulo Najera MD PhD         PATIENT'S BEHAVIOR CHANGE GOALS:   See nutrition intervention for patient stated behavior change goals.       MONITOR / EVALUATE:  Registered Dietitian will monitor/evaluate the following:   Beliefs and attitudes related to food  Food and nutrition knowledge / skills  Food / Beverage / Nutrient intake   Pertinent Labs  Progress toward meeting stated nutrition-related goals  Readiness to change nutrition-related behaviors  Weight change  Digestion     COORDINATION OF CARE:  Follow up with gastroenterology      FOLLOW-UP:  Follow up with [facility] on Dec 15th at 3pm video visit       Time spent in minutes: 45 minutes 3 units   Encounter: Individual    Aurora Briseno, PIPE, CLT, LD  Integrative Registered Dietitian

## 2023-10-24 NOTE — PATIENT INSTRUCTIONS
NUTRITION INTERVENTION:  Low FODMAP Elimination and Reintroduction Diet     Long Term Goals:   Goal: 1-2 bowl movement daily Thayer Stool Type 4 soft and formed  Goal: Decrease digestive symptoms -loose stool, gas, bloating, and constipation       Short Term Goals:  Goal 1: Cut back on wheat/gluten and dairy. Focus on reviewing high FODMAP foods (info below and handout provided) specifically, onion, garlic, legums/beans, nuts - almonds, coconut milk, cashews to see if they trigger symptoms.   .      Goal 2: Needs Improvement: Focus on starting a low fodmap smoothie for breakfast with focus on adding in adequate fiber. See recipes provided and try to add in some collagen protein to the smoothies that you already have on hand.      Also, check out some of the other breakfast ideas provided such as oatmeal (naturally gluten free) with dairy free alternative milk, flax seed, nuts such as walnuts, fruit such as berries and protein such as chicken/turkey sausage.     Try an 1/8 avocado or 1 tbsp of nut butter ex peanut butter on slice of gluten or sourdough bread - peanuts are low in fodmaps but a legume so monitor if trigger symptoms. Can also try some other nut butters such as almond or sunflower these are higher fodmap foods but important for you to identify if they trigger symptoms.         Goal 3: Improvement Shown:  Try to add in a snack at least 1-2x per day with focus on adding in extra fiber. Meal and snack planning can be challenging at the best of times, particularly for those of us following a low FODMAP diet. Snacks are an important part of meal planning, as they can help to fill the gaps nutritionally i.e. snacking on yoghurt/cheese and biscuits to top up calcium intake for the day. However, for some people and at certain times of day (e.g. in between work and dinner), snacking can become problematic not just from a FODMAP point of view, but also in regards to calorie intake and general healthy eating. Here  are some ohealthy low FODMAP snack ideas. It is important that also monitor how you handle dairy from cheese for example it could trigger symptoms even if a low fodmap.     Tips and tricks:   Mix and match your food groups - adding a source of protein to your snacks will keep you feeling kinsey for longer. E.g. peanut butter on rice cakes or gluten free toast, yoghurt & fruit, cheese & crackers.- try [employer] Chips - monitor if the almond flour triggers symptoms as almonds are higher fodmap sometimes you can consume small portions and still be ok and or try Siete Chips as they are gluten/wheat free.     Get organised at the start of the week - although meal prepping can be laborious, you will thank yourself for putting some extra time aside for snacks. See our snacks that require preparation below.      Have food handy for when you are on the move - you might have pre-packaged snacks ready in your bag or drawer at work (e.g. pack of mixed nuts, fruit or even jar of brazil nuts), or in the fridge at home (e.g. chopped up vegetables and dips).     Go for dairy - a low FODMAP diet does not mean a low dairy diet! Dairy is a great source of protein, calcium, and other vitamins/minerals with plenty of suitable lactose-free options available for those following a lactose-free diet. On-the-go pouch yoghurts (look for low sugar and monitor symptoms), as well as cheese & crackers are excellent choices for those who are busy and might find themselves snacking on the move.     Boost your fibre intake: eating fibre containing foods helps us to feel kinsey for longer. Keep the peels on the low FODMAP fruits and vegetables, sprinkle extra seeds (i.e. ayaan or kee) on your yoghurt, and consider wholegrain varieties where possible when choosing low FODMAP breads.     Consider your reusable food containers - gone are the days of simply taking a piece of fruit as a snack. These days, food storage containers come in a variety of  shapes and sizes for all sorts of tasty snacks. This can be particularly helpful when preparing meals and snacks the night before a busy work or school day e.g. squeeze pouches for yoghurt if tolerated (now there is almond and coconut dairy free varieties) or containers with separate compartments for dip and vegetables.     Snacks that require preparation:   Smoothies: green, summer berry, ariane peanut butter   Sweet: peanut butter bars, muesli bars, mixed berry & yoghurt granola bar  Savoury: savoury slice, vegetable muffins, rice paper roll, zucchini and rice slice  Energy balls: Oat & ariane chip, peanut butter protein balls, double ariane bliss balls    Snacks with minimal preparation:  Protein: Boiled eggs, tuna can, 20g mixed nuts or 10 almonds  Fruit: kiwi fruit, pineapple, mandarin, orange, firm banana, rockmelon/cantaloupe  Dairy & alternatives: yoghurt (lactose-free), hot chocolate made with drinking chocolate or latte made with lactose-free if required or soy milk (made from soy protein), cheese & crackers  Vegetables: vegetables with dip, pre-made sweet potato chips (baked with olive oil and a sprinkle of spices)  Gluten free toast or rice crackers with toppings: peanut butter, cheese, marmalade     Goal 4: Needs Improvement: Tracking what you eat. Writing down or tracking through an ahsan what you eat as well as how you feel can help you identify patterns in your symptoms. This can help you become more aware and create a diet that is right for you without over restricting.     mysymptoms ahsan, you can track symptoms, bowl movements, medications, stress, exercise, sleep and foods as well as beverages to become more mindful. [email or web address]    Goal 5: Needs Improvement: Start probiotic lactic acid based by ruthy duarte MD either the 30 or 100 billion for at least 30-90 days let me know how you tolerate as it could be beneficial to add a soil based such as the following    https://[email or web address].com/products/sbo-probiotics-ultimate    Start one muli by pure encapsulations 1 cap per day with food and womens omega 3 by nordic naturals 2 cap per day with food     Calm Magnesium plus calcium powder add to some chamomile tea before or even in the smoothie 1 tsp and increase to 250-400mg (nice to try in the future)     Start Pure Encapsulations Iron-C  Iron and Vitamin C Supplement to Support Muscle Function, Red Blood Cell Function, and Energy*  - 1 capsule daily with food in am with multivitamin     Or get the O.N.E. Multivitamin with Iron   Pure Encapsulations -    Nordic Naturals Evening Primrose oil - 2 caps daily with food (nice to try in the future)     Goal 6: Needs Improvement: Work on just meal planning check out [email or web address]     What are FODMAPs?  FODMAP stands for fermentable oligo-, di-, monosaccharides and polyols    These short-chain carbs are resistant to digestion. Instead of being absorbed into your bloodstream, they reach the far end of your intestine, where most of your gut bacteria reside.    Your gut bacteria then use these carbs for fuel, producing hydrogen gas and causing digestive symptoms in sensitive individuals. FODMAPs also draw liquid into your intestine, which may cause diarrhea.    Although not everyone has a sensitivity to FODMAPs, it is very common among people with irritable bowel syndrome (IBS)     Common FODMAPs include:    Fructose: a simple sugar found in many fruits and vegetables that also makes up the structure of table sugar and most added sugars  Lactose: a carbohydrate found in dairy products like milk  Fructans: found in many foods, including grains like wheat, spelt, rye and barley  Galactans: found in large amounts in legumes  Polyols: sugar alcohols like xylitol, sorbitol, maltitol, and mannitol. They are found in some fruits and vegetables and often used as sweetener      What is the purpose of a FODMAP diet?  A FODMAP diet is a 3  step diet used to help manage digestive symptoms.  Common gut problem with symptoms including abdominal (tummy) pain, bloating, wind (farting) and changes in bowel habit (diarrhea, constipation or both).     The aims of the diet are to:    Learn which foods and FODMAPs you tolerate, and which trigger your digestive symptoms. Understanding this will help you to follow a less restrictive, more nutritionally balanced diet for the long term that only restricts foods that trigger your digestive symptoms.    Assess whether your digestive symptoms are sensitive to FODMAPs. If you don't experience symptom improvement on the diet, than you may need to consider other digestive therapies.      How to follow a FODMAP diet      Stage 1: Restriction/Elimination   This stage involves strict avoidance of all high-FODMAP foods. It's important to note you should NOT avoid all FODMAPs long-term, and this stage should only last about 4-12 weeks. This is because it's important to include FODMAPs in the diet for gut health.      First restrict alcohol, caffeine, spicy foods and other common food triggers (wheat and dairy). Focus should be on eating whole, unprocessed real foods in their unprocessed forms such as fruits, vegetables, whole grains (prefer wheat/gluten free), nuts, extra virgin olive oil, herbs, moderate amounts of plant based proteins, and healthy fats. Monitor how you handle nightshades, corn, soy as these can be common triggers for digestive issues.     A low-fodmap diet is complex and should be tailored to your individual symptoms. Moving through the following stages over 4-12 weeks to provide more awareness as to what foods could be triggering symptoms.   Some people notice an improvement in symptoms in the first week, while others take the full eight - twelve weeks. Once you have adequate relief of your digestive symptoms, you can progress to the second stage.  If by eight to twelve weeks your gut symptoms have not  "resolved you should check for hidden sources of fodmaps, try probiotic/digestive enzymes and consider life stressors as stress is a major contributor.     The diet can be difficult to follow if you are not prepared. Here are some tips:  Try avoiding just wheat by going gluten free or taking out dairy first while you prepare to make other dietary changes to follow a low-FODMAP diet. Eliminating these two foods first and finding alternatives can make you feel less restricted before you begin to take out other foods.   Find out what to buy: Ensure you have access to credible low-FODMAP food lists. See handouts of where to find these.  Get rid of high-FODMAP foods: Clear your fridge and pantry of these foods.  Make a shopping list: Create a low-FODMAP shopping list before heading to the grocery store, so you know which foods to purchase or avoid.  Read menus in advance: Familiarize yourself with low-FODMAP menu options so you'll be prepared when dining out.    Stage 2: Reintroduction    This stage involves systematically reintroducing high-FODMAP foods. The purpose of this is twofold:   To identify which types of FODMAPs you tolerate. Few people are sensitive to all of them.  To establish the amount of FODMAPs you can tolerate. This is known as your \"threshold level.\"    In this step, you test specific foods one by one for three days each.  It is recommended that you undertake this step with a trained dietitian who can guide you through the appropriate foods. Alternatively, this ahsan can help you identify which foods to reintroduce. [email or web address]-[email or web address]    It is worth noting that you need to continue a low-FODMAP diet throughout this stage. This means even if you can tolerate a certain high-FODMAP food, you must continue to restrict it until stage 3.  It is also important to remember that, unlike people with most food allergies, people with IBS can tolerate small amounts of FODMAPs.     Stage 3: " "Personalization  This stage is also known as the \"modified low-FODMAP diet.\" In other words, you still restrict some FODMAPs. However, the amount and type are tailored to your personal tolerance, identified in stage 2.  It is important to progress to this final stage in order to address nutrient deficiencies and increase diet variety and flexibility.    This step is aimed to relax dietary restrictions as much as possible, expand the variety of foods included in your diet and establish a  personalized FODMAP diet  for the long-term. In this step well tolerated foods and FODMAPs are reintroduced to your diet, while poorly tolerated foods and FODMAPs are restricted, but only to a level that provides symptom relief. We recommend that you repeat challenges of poorly tolerated foods and FODMAPs over time to see whether your tolerance changes. You can also use the Filter function in the [employer]ersion FODMAP Hilda to personalize your hilda experience during Step 3 of the diet.      Foods high in FODMAPs  Here is a list of some common foods and ingredients that are high in FODMAPs:    Fruits: apples, applesauce, apricots, blackberries, boysenberries, canned fruit, cherries, dates, figs, peaches, pears, watermelon  Sweeteners: fructose, high fructose corn syrup, honey, maltitol, mannitol, sorbitol, xylitol  Dairy products: ice cream, milk (from cows, goats, and sheep), most yogurts, soft and fresh cheeses (cottage cheese, ricotta, etc.), sour cream, whey protein supplements  Vegetables: artichokes, asparagus, beetroot, broccoli, [place] sprouts, cabbage, cauliflower, fennel, garlic, leeks, mushrooms, okra, onions, peas, shallots  Legumes: beans, baked beans, chickpeas, lentils, red kidney beans, soybeans  Wheat: biscuits, bread, most breakfast cereals, crackers, pancakes, pasta, tortillas, waffles  Other grains: barley, rye  Beverages: beer, fortified ryan, fruit juices, milk, soft drinks with high fructose corn syrup, soy " milk      Foods you can eat on a low FODMAP diet  Keep in mind that the purpose of this diet is not to completely eliminate FODMAPs, which is extremely difficult. Simply minimizing these types of carbs is considered sufficient to reduce digestive symptoms.    There is a wide variety of healthy and nutritious foods that you can eat on a low FODMAP diet, including    Meats, fish, and eggs (well tolerated unless they have added high FODMAP ingredients, like wheat or high fructose corn syrup)  All fats and oils  Most herbs and spices  Nuts and seeds (including almonds, peanuts, macadamia nuts, pine nuts, and sesame seeds but not pistachios or cashews, which are high in FODMAPs)    Fruits, such as:  unripe bananas  blueberries  cantaloupe  grapefruit  grapes  kiwi  yancy  lime  mandarins  melons (except watermelon)  oranges  passionfruit  raspberries  strawberries  sweeteners (maple syrup, molasses, and stevia)  dairy products if they are lactose-free as well as hard cheeses and aged softer varieties (like Brie and Camembert)    Vegetables, such as:  alfalfa  bell peppers  bok flower  carrots  celery  chives  cucumbers  eggplant  elma  green beans  kale  lettuce  olives  parsnips  potatoes  radishes  spinach  spring onion (only green)  squash  sweet potatoes  tomatoes  turnips  water chestnuts  yams  zucchini    Grains, such as:  corn  oats  quinoa  rice  sorghum  tapioca  beverages (water, coffee, tea, etc)    However, keep in mind that these lists are neither definitive nor exhaustive. Naturally, there are foods not listed here that are either high or low in FODMAPs.     In addition, everyone is different. You may tolerate some foods on the list of foods to avoid while noticing digestive symptoms from foods low in FODMAPs for other reasons.  How much of a food you eat will affect how likely you are to experience symptoms if you have IBS. The individual tolerance to FODMAPs varies.      NUTRITION RESOURCES:  1. Core  Food Plan   2.  [place] FODMAP Website, Hilda and Handout- 3 step FODMAP diet guide & food list  3. Low FODMAP Smoothie Recipes         21 Day Low FODMAP Smoothie Challenge (21 Day Low FODMAP Challenges Book 1) Gagan Edition by Aria Bedoya        Low FODMAP Smoothies Recipe Book: A Beginners Guide to Low FODMAP Smoothies for Gut Health  4. The Low-FODMAP Diet for Beginners: A 7-Day Plan to Beat Bloat and Soothe Your Gut with Recipes for Fast IBS Relief by Edie Mcginnis  (Author), Patricia SMITH LD [facility]  (Author), Regulo Najera MD PhD

## 2023-11-14 ENCOUNTER — MYC REFILL (OUTPATIENT)
Dept: GASTROENTEROLOGY | Facility: CLINIC | Age: 23
End: 2023-11-14
Payer: COMMERCIAL

## 2023-11-14 DIAGNOSIS — K21.9 LPRD (LARYNGOPHARYNGEAL REFLUX DISEASE): ICD-10-CM

## 2023-11-14 DIAGNOSIS — K21.9 GASTROESOPHAGEAL REFLUX DISEASE, UNSPECIFIED WHETHER ESOPHAGITIS PRESENT: ICD-10-CM

## 2023-11-15 RX ORDER — OMEPRAZOLE 40 MG/1
40 CAPSULE, DELAYED RELEASE ORAL EVERY MORNING
Qty: 90 CAPSULE | Refills: 0 | Status: SHIPPED | OUTPATIENT
Start: 2023-11-15 | End: 2024-02-15 | Stop reason: ALTCHOICE

## 2023-11-15 NOTE — TELEPHONE ENCOUNTER
omeprazole (PRILOSEC) 40 MG DR capsule   Last Written Prescription Date:  8/7/2023  Last Fill Quantity: 90 (no print),   # refills: 0  Last Office Visit : 11/14/2023  [place]  Future Office visit:  12/7/2023    Routing refill request to provider for review/approval because:  Refill needs review- future visit within the next 30 days and plan last visit re-start as trial dose.  - patient requested refill via [employer]

## 2023-11-17 ENCOUNTER — PATIENT OUTREACH (OUTPATIENT)
Dept: OBGYN | Facility: CLINIC | Age: 23
End: 2023-11-17
Payer: COMMERCIAL

## 2023-11-17 ENCOUNTER — MYC MEDICAL ADVICE (OUTPATIENT)
Dept: OBGYN | Facility: CLINIC | Age: 23
End: 2023-11-17
Payer: COMMERCIAL

## 2023-11-17 DIAGNOSIS — R87.610 ATYPICAL SQUAMOUS CELLS OF UNDETERMINED SIGNIFICANCE (ASCUS) ON PAPANICOLAOU SMEAR OF CERVIX: Primary | ICD-10-CM

## 2023-11-21 NOTE — TELEPHONE ENCOUNTER
"Shane Gonzalez -     Patient has a pap visit scheduled with you for this Friday 11/24.  She will be on day 5 of her menses however and is wondering if she should reschedule of if the pap could still be completed?  \"I have an appointment set for this Thursday - however, I just got my period yesterday so I will be on day 5. Will I still be okay to go or should I reschedule?\"     Please reply to her through [employer] message.    Thank you!  Lynette Nissen RN    "

## 2023-11-24 ENCOUNTER — TELEPHONE (OUTPATIENT)
Dept: MIDWIFE SERVICES | Facility: CLINIC | Age: 23
End: 2023-11-24

## 2023-11-24 ENCOUNTER — OFFICE VISIT (OUTPATIENT)
Dept: MIDWIFE SERVICES | Facility: CLINIC | Age: 23
End: 2023-11-24
Payer: COMMERCIAL

## 2023-11-24 VITALS
OXYGEN SATURATION: 99 % | HEART RATE: 69 BPM | WEIGHT: 167.6 LBS | DIASTOLIC BLOOD PRESSURE: 70 MMHG | BODY MASS INDEX: 25.48 KG/M2 | SYSTOLIC BLOOD PRESSURE: 135 MMHG

## 2023-11-24 DIAGNOSIS — Z12.4 SCREENING FOR CERVICAL CANCER: Primary | ICD-10-CM

## 2023-11-24 DIAGNOSIS — Z11.3 SCREEN FOR STD (SEXUALLY TRANSMITTED DISEASE): ICD-10-CM

## 2023-11-24 PROCEDURE — 87210 SMEAR WET MOUNT SALINE/INK: CPT | Performed by: ADVANCED PRACTICE MIDWIFE

## 2023-11-24 PROCEDURE — 88141 CYTOPATH C/V INTERPRET: CPT

## 2023-11-24 PROCEDURE — 87591 N.GONORRHOEAE DNA AMP PROB: CPT | Performed by: ADVANCED PRACTICE MIDWIFE

## 2023-11-24 PROCEDURE — 87491 CHLMYD TRACH DNA AMP PROBE: CPT | Performed by: ADVANCED PRACTICE MIDWIFE

## 2023-11-24 PROCEDURE — 99213 OFFICE O/P EST LOW 20 MIN: CPT | Performed by: ADVANCED PRACTICE MIDWIFE

## 2023-11-24 PROCEDURE — 88175 CYTOPATH C/V AUTO FLUID REDO: CPT | Performed by: ADVANCED PRACTICE MIDWIFE

## 2023-11-24 RX ORDER — LACTOBACILLUS RHAMNOSUS GG 10B CELL
1 CAPSULE ORAL DAILY
COMMUNITY

## 2023-11-24 RX ORDER — OMEGA-3/DHA/EPA/FISH OIL 60 MG-90MG
500 CAPSULE ORAL DAILY
COMMUNITY

## 2023-11-24 NOTE — TELEPHONE ENCOUNTER
[facility] Call [facility]    Phone Message    May a detailed message be left on voicemail: yes     Reason for Call: Other: Pt has an appointment today at 10am. Pt is scheduled for a pap and is currently still on her period. Pt is wondering if it is OK for her to still come in and get her pap. Please advise and call pt. Thank you!      Action Taken: Other: OBGYN    Travel Screening: Not Applicable

## 2023-11-24 NOTE — PROGRESS NOTES
S: Patient presents for repeat pap. Feeling well. Here for repeat pap and questions about ongoing struggles with BV. Has approached from all angles and is feeling frustrated. Eating well, sleeping well, exercising and has a therapist. Wondering about hormonal affects on BV. Pt states when she refilled OCP they gave her a different version of the pill and she noted weight gain, decreased libido.     O:   /70   Pulse 69   Wt 76 kg (167 lb 9.6 oz)   LMP 11/19/2023 (Exact Date)   SpO2 99%   BMI 25.48 kg/m      General: well appearing, in NAD  Cardiac: well perfused  Respiratory: non-labored breathing on room air   Psych: alert and oriented     A/P:  (Z12.4) Screening for cervical cancer  (primary encounter diagnosis)  Comment:   Plan: Pap diagnostic only            (Z11.3) Screen for STD (sexually transmitted disease)  Comment:   Plan: Wet preparation, Chlamydia         trachomatis/Neisseria gonorrhoeae by PCR          Discussed that OCP is one of last factors that hasn't been changed to see if it has an affect on chronic BV as well as the weight gain/libido that she described with the pill change. Can always restart pill if contraception is needed as she is not currently sexually active. If this is the case, she will reach out to request previous pill that had less side effects.     Lisa Washburn CNM

## 2023-11-24 NOTE — TELEPHONE ENCOUNTER
RN called pt back.     Is on day 5 of her cycle. Was changing her pad/tampon every 4-6 hours yesterday.     OK to still come in for pap smear today.     DERRICK Wagner

## 2023-11-25 LAB
C TRACH DNA SPEC QL PROBE+SIG AMP: NEGATIVE
N GONORRHOEA DNA SPEC QL NAA+PROBE: NEGATIVE

## 2023-11-30 LAB
BKR LAB AP GYN ADEQUACY: ABNORMAL
BKR LAB AP GYN INTERPRETATION: ABNORMAL
BKR LAB AP HPV REFLEX: NO
BKR LAB AP LMP: ABNORMAL
BKR LAB AP PREVIOUS ABNL DX: ABNORMAL
BKR LAB AP PREVIOUS ABNORMAL: ABNORMAL
PATH REPORT.COMMENTS IMP SPEC: ABNORMAL
PATH REPORT.COMMENTS IMP SPEC: ABNORMAL
PATH REPORT.RELEVANT HX SPEC: ABNORMAL

## 2023-12-01 ENCOUNTER — PATIENT OUTREACH (OUTPATIENT)
Dept: MIDWIFE SERVICES | Facility: CLINIC | Age: 23
End: 2023-12-01
Payer: COMMERCIAL

## 2023-12-07 ENCOUNTER — VIRTUAL VISIT (OUTPATIENT)
Dept: GASTROENTEROLOGY | Facility: CLINIC | Age: 23
End: 2023-12-07
Attending: PHYSICIAN ASSISTANT
Payer: COMMERCIAL

## 2023-12-07 VITALS — WEIGHT: 165 LBS | BODY MASS INDEX: 25.01 KG/M2 | HEIGHT: 68 IN

## 2023-12-07 DIAGNOSIS — K58.2 IRRITABLE BOWEL SYNDROME WITH BOTH CONSTIPATION AND DIARRHEA: Primary | ICD-10-CM

## 2023-12-07 DIAGNOSIS — K21.9 LPRD (LARYNGOPHARYNGEAL REFLUX DISEASE): ICD-10-CM

## 2023-12-07 PROCEDURE — 99213 OFFICE O/P EST LOW 20 MIN: CPT | Mod: VID | Performed by: PHYSICIAN ASSISTANT

## 2023-12-07 ASSESSMENT — PAIN SCALES - GENERAL: PAINLEVEL: NO PAIN (0)

## 2023-12-07 NOTE — NURSING NOTE
Is the patient currently in the state of [place]? YES    Visit mode:VIDEO    If the visit is dropped, the patient can be reconnected by: VIDEO VISIT: Send to e-mail at: [email or web address]    Will anyone else be joining the visit? NO  (If patient encounters technical issues they should call [phone])    How would you like to obtain your AVS? MyChart    Are changes needed to the allergy or medication list? No    Reason for visit: ANSELMO PINEDA

## 2023-12-07 NOTE — PROGRESS NOTES
GI FOLLOW UP VISIT     CC/REFERRING MD:    RAYMOND WHITE MD       HISTORY OF PRESENT ILLNESS:    Arlen William is 23 year old female who presents for follow up. We initially visited in August 2023.     Initial consult HPI 8/7/2023  She reports that her symptoms have been going on for a few years.  She recalls irregular bowel habits happening when she joined the  in 2018 which she relates to the type of meal she was eating.     Her symptoms exacerbated in 2021.  She started to have alternating bowel movements between constipation or extreme diarrhea.  She never feels regular.  She has noticed that symptoms are worse during times of stress.  She has not been able to determine any dietary triggers and has not tried any elimination diets.  She can experience 2-3 urgent loose stools with diarrhea followed by 1 to 2 days of no bowel movements.  When constipated bowel movements are hard and difficult to pass and require straining.  She does feel that she is more often constipated than with diarrhea.  She has occasionally seen small amounts of bright red blood per rectum which she suspects could be a hemorrhoid.  She otherwise denies any hematochezia or melena.  She does have some bloating and discomfort with constipation which usually improves after having bowel movements.  She otherwise does not have much in terms of abdominal pain.  She was given Colace to take during times of constipation but she feels that this led to further diarrhea. She does drink lots of water. She is usually active but this has decreased.      She also reports having heartburn since childhood.  She primarily notes that at night when laying down to sleep.  She does not eat too close to bedtime.  She usually has dinner around 5 or 6 pm and goes to bed by 10 PM.  She does not snack late. She has tried sleeping elevated but is not very comfortable.  She was given Prilosec to try last year.  She tried this for about 3  weeks and did not see an improvement so she discontinued as she prefers not to take medications.  There is occasional nausea.  She denies vomiting.  No dysphagia.  No odynophagia.  She does experience phlegm most mornings.  Had previously met with an ENT and was told this could be related to reflux as well. She does not use NSAID's. She also reports feeling very fatigued and exhausted. Recent labs note unremarkable cbc, cmp, TSH, free T4 and ESR.        She returns today for follow up. We completed a number of labs and stool studies at time of initial visit. 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 Treated with abx given her persistent symptoms. She did see some improvement afterwards. Labs noted a low normal b12 and low iron saturation, so advised to supplement. Remainder of work was unremarkable including negative celiac screening. She continues to feel fatigue and exhausted. BM's have improved consistency. She is also on probiotics and fish oil which she feels is helping. She is also on prilosec which helps with reflux symptoms. No hematochezia. No melena. Does have heavy periods.     She does not smoke.  She may have 1 drink every few weeks.    Hipolito  has a past medical history of Abnormal Pap smear of cervix (08/12/2020), Asthma, mild persistent, Concussion (1/2012), and Mononucleosis (06/2019).    She  has a past surgical history that includes Arthroscopic reconstruction anterior cruciate ligament (8/19/13); TOOTH EXTRACTION W/FORCEP; and Esophagoscopy, gastroscopy, duodenoscopy (EGD), combined (N/A, 12/2/2016).    She  reports that she has never smoked. She has never used smokeless tobacco. She reports current alcohol use. She reports that she does not use drugs.    Her family history includes Cancer in her maternal grandmother; Diabetes in her maternal grandmother and mother; GERD in her mother; Gallbladder Disease in her mother; Hypertension in her father and paternal grandfather; Lipids in her  father.    ALLERGIES:  Patient has no known allergies.        PERTINENT MEDICATIONS:    Current Outpatient Medications:     Cholecalciferol (VITAMIN D) 1000 UNITS capsule, Take 1 capsule by mouth daily, Disp: , Rfl:     fish oil-omega-3 fatty acids 500 MG capsule, Take 500 mg by mouth daily, Disp: , Rfl:     lactobacillus rhamnosus, GG, (CULTURELL) capsule, Take 1 capsule by mouth daily, Disp: , Rfl:     metroNIDAZOLE (METROGEL) 0.75 % vaginal gel, Apply vaginally twice weekly for 4-6 months., Disp: 70 g, Rfl: 11    Multiple Vitamin (MULTIVITAMINS PO), Take 1 tablet by mouth daily., Disp: , Rfl:     omeprazole (PRILOSEC) 40 MG DR capsule, Take 1 capsule (40 mg) by mouth every morning On an empty stomach about 30 minutes before breakfast, Disp: 90 capsule, Rfl: 0    Biotin 10 MG CAPS, , Disp: , Rfl:     norgestimate-ethinyl estradiol (ORTHO-CYCLEN) 0.25-35 MG-MCG tablet, Take 1 tablet by mouth daily (Patient not taking: Reported on 12/7/2023), Disp: 84 tablet, Rfl: 4      PHYSICAL EXAMINATION:  Constitutional: aaox3, cooperative, pleasant, not dyspneic/diaphoretic, no acute distress  GENERAL: Healthy, alert and no distress  EYES: Eyes grossly normal to inspection.  No discharge or erythema, or obvious scleral/conjunctival abnormalities.  RESP: No audible wheeze, cough, or visible cyanosis.  No visible retractions or increased work of breathing.    SKIN: Visible skin clear. No significant rash, abnormal pigmentation or lesions.  NEURO: Cranial nerves grossly intact.  Mentation and speech appropriate for age.  PSYCH: Mentation appears normal, affect normal/bright, judgement and insight intact, normal speech and appearance well-groomed.        ASSESSMENT/PLAN:    Irritable bowel syndrome with both constipation and diarrhea  LPRD      Arlen William is a 23 year old female who presents for follw up. Reviewed recent labs/stool studies completed. We did find cryptosporidium and EAEC on stool cx which we treated with  abx given her persistent symptoms. There has been improvement in symptoms since treatment. She also continues omeprazole which ha helped upper Gi symptoms. Remainder of her GI work up was unrevealing including negative fecal calpro, negative celiac screening. She however continues to have fatigue as a main concern. Of note, her iron saturation was decreased. She does have cycles. Advised to supplement. B12 also on lower end, so can supplement b12 as well. Encouraged pt to address continued fatigue with primary care provider.     Recommended working with nutritionist to manage IBS. Previously offered C, pt politely declined.     Follow up in 3 months, sooner if needed.      Thank you for this consultation.  It was a pleasure to participate in the care of this patient; please contact us with any further questions.        This note was created with voice recognition software, and while reviewed for accuracy, typos may remain.       I spent a total of 25 minutes on the day of the visit.   Time spent by me doing chart review, history and exam, documentation and further activities per the note      Lee Ann Carbajal PA-C  Division of Gastroenterology, Hepatology and Nutrition   [facility]            Video-Visit Details    Type of service:  Video Visit    Joined the call at 12/7/2023, 9:40:43 am.    Left the call at 12/7/2023, 10:06:38 am.    You were on the call for 25 minutes 54 seconds .    Originating Location (pt. Location): Home    Distant Location (provider location):  On-site    Platform used for Video Visit: [employer]

## 2023-12-13 NOTE — PATIENT INSTRUCTIONS
It was a pleasure taking care of you today.  I've included a brief summary of our discussion and care plan from today's visit below.  Please review this information with your primary care provider.  _______________________________________________________________________     My recommendations are summarized as follows:     - take iron supplement every other day  - take vitamin b12 supplement daily   - start fiber, if you haven't already:  I recommend a powdered soluble fiber such as metamucil or citrucel. This can help regulate your bowel movements and promote better consistency of your stools. Start with 1-2 teaspoons per day, with goal to gradually increase to 1 tablespoon daily. You can increase up to 1 tablespoon three times daily if needed. Stay well-hydrated with use of fiber supplementation and to make sure that the fiber powder is well mixed with water as directed on the label.    Follow up ~ 3 months, sooner if needed.    ______________________________________________________________________     How do I schedule labs, imaging studies, or procedures that were ordered in clinic today?      Labs: To schedule lab appointment you can contact your local [facility] or call [phone] to schedule at any convenient [facility] location.     Procedures: If a colonoscopy, upper endoscopy, breath test, esophageal manometry, or pH impedence was ordered today, our endoscopy team will call you to schedule this. If you have not heard from our endoscopy team within a week, please call [phone] to schedule.      Imaging Studies: If you were scheduled for a CT scan, X-ray, MRI, ultrasound, HIDA scan or other imaging study, please call [phone] to have this scheduled.      Referral: If a referral to another specialty was ordered, expect a phone call or follow instructions above. If you have not heard from anyone regarding your referral in a week, please call our clinic to check the status.      Who  do I call with any questions after my visit?  Please be in touch if there are any further questions that arise following today's visit.  There are multiple ways to contact your gastroenterology care team.       During business hours, you may reach a Gastroenterology nurse at [phone]     To schedule or reschedule an appointment, please call [phone].      You can always send a secure message through [employer].  [employer] messages are answered by your nurse or doctor typically within 24 hours.  Please allow extra time on weekends and holidays.       For urgent/emergent questions after business hours, you may reach the on-call GI Fellow by contacting the [facility]  at [phone].     How will I get the results of any tests ordered?    You will receive all of your results.  If you have signed up for [email or web address]t, any tests ordered at your visit will be available to you after your physician reviews them.  Typically this takes 1-2 weeks.  If there are urgent results that require a change in your care plan, your physician or nurse will call you to discuss the next steps.       What is [employer]?  [employer]Safe is a secure way for you to access all of your healthcare records from the [facility].  It is a web based computer program, so you can sign on to it from any location.  It also allows you to send secure messages to your care team.  I recommend signing up for [employer] access if you have not already done so and are comfortable with using a computer.       How to I schedule a follow-up visit?  If you did not schedule a follow-up visit today, please call [phone] to schedule a follow-up office visit.      Lee Ann Carbajal PA-C  Division of Gastroenterology, Hepatology and Nutrition   [facility]

## 2023-12-20 ENCOUNTER — VIRTUAL VISIT (OUTPATIENT)
Dept: NUTRITION | Facility: CLINIC | Age: 23
End: 2023-12-20
Payer: COMMERCIAL

## 2023-12-20 DIAGNOSIS — R53.83 FATIGUE, UNSPECIFIED TYPE: ICD-10-CM

## 2023-12-20 DIAGNOSIS — K59.09 CHRONIC CONSTIPATION: Primary | ICD-10-CM

## 2023-12-20 PROCEDURE — 97803 MED NUTRITION INDIV SUBSEQ: CPT | Mod: VID | Performed by: DIETITIAN, REGISTERED

## 2023-12-20 NOTE — PROGRESS NOTES
Medical Nutrition Therapy  Visit Type: Reassessment and intervention    Visit Details    How would you like to obtain your AVS? MyChart  If the correspondence for visit is dropped, how would you like your dietitian to reconnect with you:   call back by phone? Yes    Text to cell phone: [phone]  Will anyone else be joining your video visit or telephone call? No  {If patient encounters technical issues they should call [phone] :15    Type of service:  Video Visit   Start Time: 11:15am    End Time:12:07pm    Originating Location (pt. Location): Home    Distant Location (provider location):  [facility] - remote offsite   Platform used for Video Visit: Daisy      Referring Provider: Lee Ann Carbajal  [facility]     REASON FOR REFERRAL:   Arlen William is a 23 year old female who is interested in Medical Nutrition Therapy (MNT) and education related to weight loss, Irritable bowel syndrome with both constipation and diarrhea   Fatigue, unspecified type   Chronic constipation     She is accompanied by self.     Changes since previous consult Yes        Behavior Status:Improvement shown  Barriers Include:Motivation/Ready to change , Financial concerns, and Lack of nutrition knowlege time, consistency with meal planning    Goals: What would you like to work on that will help you most over the next several weeks? Meal planning especially dinner and increasing healthy fats and protein along with fiber       She still feels exhausted which is a big reason why she got off birth control in addition to gaining weight about 10lbs. She just got of birth control and her digestive issues increased she was having more abdominal pain, nausea and cramps. She has been on birth control for over 10 years and wants to get the root of the issue. Its been better over the last couple of days. She has started to make smoothie a couple times per week.  She has been adding in ayaan seeds, spinach,  frozen fruit and almond milk. She has been doing vanilla whey protein powder. She seems to be tolerating well mostly doing berries and mostly low fodmap.   She has been having Bms daily now formed and soft.     She wants to work on prepping them better. Time to put them together and cleaning up is a pain. Her  isn't deal and considering investing in better one. She is trying to remain consistent with taking supplements.Snacks are going well she has been adding in nuts and seems to tolerate the high fodmap nuts like cashew and pistachios don't trigger. She has been trying luz gluten free bread and hasn't been able to pinpoint what is making her digestion better unsure if it is the gluten free/what and supplements and off birth control. She got the probiotic, omega women's, vitamin D and multi by pure encapsulations and taking consistently for past few months. She has found her acid reflux improving too. Lunch has been leftovers and trying to go off of meal plans provided for dinner. Her meal planning has gotten more consistent. She hasn't had a chance to try Sjapper. Suggested mealime membership. She still needs to review the healthy fats and protein options.    Not sure if tracking is something resonates with her and if she will be able to do it. We discussed that she can always track in future and focus on eating variety of healthy foods right now. She hasn't lost weight since going off the birth control unsure though as she doesn't weight herself. She wants to continue to work out for weight loss 3-5x per week for about an hour. Dicussed adding in ore walking to balance hormones and help with weight loss. She isn't focused on a number for weight lsos just wants to be stronger and healthier. Wants to feel good about herself and have about 10lbs less that is where she felt the best. Discussed importance of personalizing the low fodmap diet and not restricing foods adding in and paying attention to foods that  trigger symptoms.       Changes since previous consult Yes        Behavior Status:Improvement shown  Barriers Include:Lack of nutrition knowlege and consistency     Goals: What would you like to work on that will help you most over the next several weeks? Get supplements and try smoothie, track food triggers.      Has been more mindful now of what she is putting into her body. She has had to make some small changes. Trying to swap out lower fodmap foods wants to try more smoothies but needs to get the funds for  and supplements. She has had to keep it more simple so hoping to be able to do more recipes that are low fodmap. Wants to focus more on meal prep now. She feels she has enough resources and recipes around FODMAPs. She has been looking for more low fodmap snack ideas and has been more aware of what she is buying when reading lables tends to do more gluten free and keto which has helped stay away from wheat. She has been trying to find healthy resources at restaurants She hasn't been able to pinpoint exact foods that are low fodamp that are triggering symptoms but has noticed that her coffee has been triggering acid reflux. She has transitioned to black coffee and that doesn't seem trigger symptoms. Hasn't tried dairy free yet to see if a creamer triggers symptoms. Acid reflux usually triggers at night but unsure what she ate during the day that causes it, wants to work on tracking foods to see. Needs to start to cut back on spicy foods sand track if triggering symptoms. She has found that eating out fried foods triggered symptoms. She has more stomach ache and irregular Bms. She hasn't found that wheat is a trigger but does feel like dairy is a bigger trigger.     INITIAL NUTRITION ASSESSMENT:   Notes 9/6/23  She tends to struggle with bloating and stomach ache throughout the day and would love to start the day going to the gym but has a constant feeling of fatigue. Usually coffee helps her have Bms,  she feels she needs it go to the bathroom. She usually has a BM daily. She is still struggling with alternating bowel movements between constipation or extreme diarrhea.  She does experience phlegm most mornings. She hasn't committed to cutting out dairy mostly cheese/cream cheese. She hasn't tried taking out wheat either however doesn't eat wheat from bread much.     9-10am Breakfast: will do oats rolled plain added water with fresh fruits and peanut butter with cinnamon and honey   Coffee with creamer or water or la croix  Eats lunch later anywhere from 1-3pm lighter bagel adds cream cheese   Usually more lazy for dinner which will be a bigger meal will eat later and but before gym around 5-7pm sometimes only eats half of dinner if eating before gym. She struggles with dinner the most will do a meat such as steak of chicken. She doesn't like carbs so doesn't eat often. She might have a lot of fruit for dinner.         9/5/2023     3:41 PM   Nutritional Goals   Nutritional Goal Create healthier eating patterns;Work on meal planning/recipes;Manage Digestive Issues;Address nutrient deficiencies;Identify adverse food reactions or triggers;Increase energy             No data to display                        No data to display                   9/5/2023     3:41 PM   Gastrointestinal   Constipation Past;Current   Hemorrhoids Past   Gas/bloating Past   Gallstones Past   Abdominal Pain Past            No data to display                    No data to display                   9/5/2023     3:41 PM   Skin   Acne Past           No data to display                    No data to display                    No data to display                    No data to display                   9/5/2023     3:41 PM   Womens Health Assessment   Hysterectomy No   I am pregnant.  No   I am breastfeeding. No   I want to become pregnant within the next year . No   What do you use for contraception?  birth control pill   Any problems with current  birth control method?   Yes   Date of last menstrual period 9/5/2023   Are your periods irregular? Yes   Irregular periods Bleed between periods   Days from the start of one period to the next. 18   Days of Menstral Flow 6   Associated with menstral periods. Painful/cramping;Heavy bleeding;Mood swings;Bloating;Breast tenderness;Diarrhea/constipation   Are you officially in menopause? (no period for one year or longer)  No        Past Medical History:  Past Medical History:   Diagnosis Date    Abnormal Pap smear of cervix 08/12/2020    see problem list    Asthma, mild persistent     Concussion 1/2012    Impact test 3/12    Mononucleosis 06/2019       Previous Surgeries:   Past Surgical History:   Procedure Laterality Date    ARTHROSCOPIC RECONSTRUCTION ANTERIOR CRUCIATE LIGAMENT  8/19/13    left    ESOPHAGOSCOPY, GASTROSCOPY, DUODENOSCOPY (EGD), COMBINED N/A 12/2/2016    Procedure: COMBINED ESOPHAGOSCOPY, GASTROSCOPY, DUODENOSCOPY (EGD), BIOPSY SINGLE OR MULTIPLE;  Surgeon: Laurie Pride MD;  Location: [facility]     HC TOOTH EXTRACTION W/FORCEP          Family History:  Family History   Problem Relation Age of Onset    Diabetes Mother         gestational     GERD Mother     Gallbladder Disease Mother     Diabetes Maternal Grandmother     Cancer Maternal Grandmother         ovarian and uterine    Hypertension Father     Lipids Father     Hypertension Paternal Grandfather     Family History Negative No family hx of     Asthma No family hx of         Eosinophilic esophagitis.    C.A.D. No family hx of     Breast Cancer No family hx of     Cerebrovascular Disease No family hx of     Cancer - colorectal No family hx of     Prostate Cancer No family hx of     Alcohol/Drug No family hx of     Allergies No family hx of     Alzheimer Disease No family hx of     Anesthesia Reaction No family hx of     Arthritis No family hx of     Blood Disease No family hx of     Cardiovascular No family hx of      Circulatory No family hx of     Congenital Anomalies No family hx of     Connective Tissue Disorder No family hx of     Depression No family hx of     Endocrine Disease No family hx of     Eye Disorder No family hx of     Genetic Disorder No family hx of     Gastrointestinal Disease No family hx of     Genitourinary Problems No family hx of     Gynecology No family hx of     Musculoskeletal Disorder No family hx of     Anxiety Disorder No family hx of     Mental Illness No family hx of     Substance Abuse No family hx of     Colon Cancer No family hx of     Other Cancer No family hx of     Thyroid Disease No family hx of     Obesity No family hx of     Osteoporosis No family hx of     Unknown/Adopted No family hx of     Hyperlipidemia No family hx of     Coronary Artery Disease No family hx of     Other - See Comments No family hx of         Lifestyle History:      9/5/2023     3:41 PM   Lifestyle   Do you feel your life is stressful right now?  Yes   What is the cause(s) of stress in your life?  Work;Health Concerns;Multi-tasking and multiple deadlines to meet   Are you currently implementing any strategies to help manage stress? Yes   What are you doing to manage stress?  Counseling;Journaling;Take time for self;Reading        Exercise History:      9/5/2023     3:41 PM   Exercise   Does your occupation require extended periods of sitting?  Yes   Does your occupation require extended periods of repetitive movements (ex: walking or lifting)?  No   Do you currently participate in any forms of exercise? Yes   Check all the exercises you participate in: Walking;Weight Lifting   How many times per week do you exercise? 3 times   How long do you usually exercise? 60 min        Sleep History:      9/5/2023     3:41 PM   Sleep   How many hours (on average) do you sleep per night? 7-9   What time do you turn off the lights? 10 PM   How long does it take for you to fall asleep? 15-20 mins   What time do you stop using  electronic devices? 10 PM   What time do you wake up? 6 AM   When do you eat your first meal?  10 AM   Do you feel well-rested during the day?  No   Do you take naps?  No   Do you have a comfortable bedroom environment (cool, quiet, dark, etc)? Yes   Do you have a sleep routine/ ritual that you do before bed?  Yes   How many hours do you spend per day looking at a screen (TV, computer, tablet and phone)? 7 to 9   Select all factors that apply to your current sleep habits: Have difficulty waking up in the morning;Feel tired/sluggish/fatigued during the day;Have tried supplements (ie: melatonin) for sleep        Nutrition History:      9/5/2023     3:41 PM   Nutrition   Have you ever had a nutrition consultation? No   Do you currently follow a special diet or nutritional program? No   What do you feel are the biggest barriers getting in the way of achieving you nutritional goals? Motivation/Readiness to change;Lack of time;Work (such as lack of time to eat, unhealthy choices, etc.);Lack of nutrition knowledge;Stress   Do you have any food allergies, sensitivities or intolerances?  No           9/5/2023     3:41 PM   Digestion   Do you experience stomach pains/cramping? Rarely   Do you experience bloating?  Daily   Do you experience gas?  Weekly   Do you experience heartburn/acid reflux/indigestion? 2-3 times per week   How often do you have a bowel movement? 1 time per day   What is a typical bowel movement like for you? Select all that apply: Hard to pass;Varies a lot;Loose;Liquid;Formed and soft;Alternates between loose and hard;Mucous, greasy          9/5/2023     3:41 PM   Food Access:    Who does the grocery shopping? Self   How often is grocery shopping done? Weekly   Where do you usually receive your groceries from? Select all that apply: [employer]   Do you read food labels? No   Who does the cooking? Select all that apply: Self   How many meals do you eat out per week?  3 to 5   What restaurants do  you typically choose? Sit-down          9/5/2023     3:41 PM   Daily Patterns:   How many days per week do you have breakfast? 6   How many days per week do you have lunch? 5   How many days per week do you have dinner? 5   How many days per week do you have snacks? 2          9/5/2023     3:41 PM   Protein Intake:   How many times per day do you typically consume a protein source(s)? 2   What types of protein do you currently eat?  Steak;Other Beef;Chicken Breast;Deli Turkey;Eggs           9/5/2023     3:41 PM   Fat Intake:    How many times per day do you typically consume healthy fat(s)? 1   What types of health fats do you currently eat? Select all that apply:  Cashews;Peanut butter;Avocado oil           9/5/2023     3:41 PM   Fruit Intake:    How many times per day do you typically consume fruits? 2   What types of fruit do currently eat? Banana;Blackberries;Blueberries;Cantaloupe;Grapes;Mandarin oranges;[place];Melon;Pineapple;Raspberries;Watermelon;Other           9/5/2023     3:41 PM   Vegetable Intake:    How many times per day do you typically consume vegetables? 1   What types of vegetables do you currently eat? Potato (baked, boiled, mashed, French fries);Spinach;Sugar snap peas;Tomato          9/5/2023     3:41 PM   Grain Intake:    How many times per day do you typically consume grains? 2   What types of grains do currently eat? Select all that apply:  Oats (gluten free);Bagel (non-gluten free);Breads (non-gluten free)           9/5/2023     3:41 PM   Dairy Intake:    How many times per day do you typically consume dairy? 2   What types of dairy do currently eat? Select all that apply:  Milk;Cheese;Yogurt           9/5/2023     3:41 PM   Non-Dairy Alternative Intake:    How many times per day do you typically consume non-dairy alternatives? 0   What types of non-dairy alternatives do currently eat? Select all that apply:  Goat cheese           9/5/2023     3:41 PM   Sweets Intake:    How many times per  day do you typically consume sweets? 1          9/5/2023     3:41 PM   Beverage Intake:    How many 8 oz cups of water do you typically consume per day?  6 to 7   How many 8 oz cups of caffeine do you typically consume per day?  1 to 2   How many drinks of alcohol do you typically consume per week (1 drink = 5 oz wine, 12 oz beer, 1.5 oz spirits)?   1 to 3           9/5/2023     3:41 PM   Lifestyle Recall:    What time did you wake up? 8 AM   What time did you go to sleep? 11 PM   What time did you have breakfast? 9-10 AM   Where did you have breakfast?  Home   What time did you have a morning snack? No snack   What time did you have lunch? 1-2 PM   Where did you have lunch?  Home   What time did you have an afternoon snack? No snack   What time did you have dinner? 6-7 PM   Where did you have dinner?  Home   What time did you have an evening snack? No Snack   What time of day did you exercise? 4 PM   Where did you exercise? Gym/Fitness Center          Additional concerns:          MEDICATIONS:  Current Outpatient Medications   Medication Sig Dispense Refill    Biotin 10 MG CAPS  (Patient not taking: Reported on 8/7/2023)      Cholecalciferol (VITAMIN D) 1000 UNITS capsule Take 1 capsule by mouth daily      fish oil-omega-3 fatty acids 500 MG capsule Take 500 mg by mouth daily      lactobacillus rhamnosus, GG, (CULTURELL) capsule Take 1 capsule by mouth daily      metroNIDAZOLE (METROGEL) 0.75 % vaginal gel Apply vaginally twice weekly for 4-6 months. 70 g 11    Multiple Vitamin (MULTIVITAMINS PO) Take 1 tablet by mouth daily.      norgestimate-ethinyl estradiol (ORTHO-CYCLEN) 0.25-35 MG-MCG tablet Take 1 tablet by mouth daily (Patient not taking: Reported on 12/7/2023) 84 tablet 4    omeprazole (PRILOSEC) 40 MG DR capsule Take 1 capsule (40 mg) by mouth every morning On an empty stomach about 30 minutes before breakfast 90 capsule 0            No data to display                  ALLERGIES:   No Known Allergies    "  .na  LABS:  Last Basic Metabolic Panel:  Lab Results   Component Value Date     07/19/2023     10/10/2022      Lab Results   Component Value Date    POTASSIUM 4.7 07/19/2023    POTASSIUM 4.0 10/10/2022     Lab Results   Component Value Date    CHLORIDE 108 07/19/2023    CHLORIDE 107 10/10/2022     Lab Results   Component Value Date    ALVIN 9.7 07/19/2023    ALVIN 9.0 10/10/2022     Lab Results   Component Value Date    CO2 27 07/19/2023    CO2 28 10/10/2022     Lab Results   Component Value Date    BUN 12.4 07/19/2023    BUN 9 10/10/2022     Lab Results   Component Value Date    CR 0.77 07/19/2023    CR 0.69 10/10/2022     Lab Results   Component Value Date    GLC 85 07/19/2023    GLC 89 10/10/2022       Last Glucose Profile:   No results found for: \"A1C\"    Last Lipid Profile:   No results found for: \"CHOL\"  No results found for: \"HDL\"  No results found for: \"LDL\"  No results found for: \"TRIG\"  No results found for: \"CHOLHDLRATIO\"    Most recent CBC:  Recent Labs   Lab Test 07/19/23  1205 10/10/22  0822 03/08/22  1558   WBC 7.0 11.3* 9.7   HGB 13.6 13.6 13.1   HCT 42.4 41.0 40.0    296 303     Most recent hepatic panel:  Recent Labs   Lab Test 07/19/23  1205 10/10/22  0822   ALT 22 31   AST 24 21     Most recent creatinine:  Recent Labs   Lab Test 07/19/23  1205 10/10/22  0822   CR 0.77 0.69       No components found for: \"GFRESETIMATED\"LASTLAB(gfrestblack:1@  Lab Results   Component Value Date    ALBUMIN 4.6 07/19/2023    ALBUMIN 3.4 10/10/2022       Last Thyroid Profile:   TSH   Date Value Ref Range Status   07/19/2023 2.37 0.30 - 4.20 uIU/mL Final     Free T4   Date Value Ref Range Status   07/19/2023 1.26 0.90 - 1.70 ng/dL Final       Last Mineral Profile:   Ferritin   Date Value Ref Range Status   09/06/2023 23 6 - 175 ng/mL Final     Iron   Date Value Ref Range Status   09/06/2023 50 37 - 145 ug/dL Final     Iron Binding Capacity   Date Value Ref Range Status   09/06/2023 387 240 - 430 ug/dL " "Final       Autoimmune & Inflammatory   CRP Inflammation   Date Value Ref Range Status   03/08/2022 <2.9 0.0 - 8.0 mg/L Final         Last Vitamin Profile:   25 OH Vit D2   Date Value Ref Range Status   03/07/2012 <5 ug/L Final     25 OH Vit D3   Date Value Ref Range Status   03/07/2012 25 ug/L Final     25 OH Vit D total   Date Value Ref Range Status   03/07/2012  ug/L Final    <30  Season, race, dietary intake, and treatment affect the concentration of   25-hydroxy-Vitamin D. Values may decrease during winter months and increase   during summer months. Values less than 30 ug/L may indicate Vitamin D   deficiency.       ANTHROPOMETRICS:  Vitals:   BP Readings from Last 1 Encounters:   11/24/23 135/70     Pulse Readings from Last 1 Encounters:   11/24/23 69     Estimated body mass index is 25.09 kg/m  as calculated from the following:    Height as of 12/7/23: 1.727 m (5' 8\").    Weight as of 12/7/23: 74.8 kg (165 lb).    Wt Readings from Last 5 Encounters:   12/07/23 74.8 kg (165 lb)   11/24/23 76 kg (167 lb 9.6 oz)   06/16/23 72.1 kg (159 lb)   12/21/22 72.6 kg (160 lb)   12/06/22 71.7 kg (158 lb)         NUTRITION DIAGNOSIS:  1. Excessive intake of high FODMAP foods related to food and nutrition knowledge deficit regarding effect of FODMAPs, as evidenced by symptoms of increased bloating, gas, soft and loose stools back to constipation again.     2. Inadequate fiber intake related to food and nutrition knowledge deficit regarding desirable quantities of fiber, as evidenced by bloating, gas, soft and loose stools and constipation and weight gain.         NUTRITION INTERVENTION:  Low FODMAP Elimination and Reintroduction Diet     Long Term Goals:   Goal: 1-2 bowl movement daily Dell Stool Type 4 soft and formed  Goal: Decrease digestive symptoms -loose stool, gas, bloating, and constipation   Goal: weight loss and maintain a healthy weight     Short Term Goals:  Goal 1: Cut back on wheat/gluten and dairy. Focus " on reviewing high FODMAP foods (info below and handout provided) specifically, onion, garlic, legums/beans, nuts - almonds, coconut milk, cashews to see if they trigger symptoms.   .      Goal 2: Improvement Shown: Focus on starting a low fodmap smoothie for breakfast with focus on adding in adequate fiber. See recipes provided and try to add in some collagen protein to the smoothies that you already have on hand.      Also, check out some of the other breakfast ideas provided such as oatmeal (naturally gluten free) with dairy free alternative milk, flax seed, nuts such as walnuts, fruit such as berries and protein such as chicken/turkey sausage.     Try an 1/8 avocado or 1 tbsp of nut butter ex peanut butter on slice of gluten or sourdough bread - peanuts are low in fodmaps but a legume so monitor if trigger symptoms. Can also try some other nut butters such as almond or sunflower these are higher fodmap foods but important for you to identify if they trigger symptoms.         Goal 3: Improvement Shown:  Try to add in a snack at least 1-2x per day with focus on adding in extra fiber. Meal and snack planning can be challenging at the best of times, particularly for those of us following a low FODMAP diet. Snacks are an important part of meal planning, as they can help to fill the gaps nutritionally i.e. snacking on yoghurt/cheese and biscuits to top up calcium intake for the day. However, for some people and at certain times of day (e.g. in between work and dinner), snacking can become problematic not just from a FODMAP point of view, but also in regards to calorie intake and general healthy eating. Here are some ohealthy low FODMAP snack ideas. It is important that also monitor how you handle dairy from cheese for example it could trigger symptoms even if a low fodmap.     Tips and tricks:   Mix and match your food groups - adding a source of protein to your snacks will keep you feeling kinsey for longer. E.g.  peanut butter on rice cakes or gluten free toast, yoghurt & fruit, cheese & crackers.- try [employer] Chips - monitor if the almond flour triggers symptoms as almonds are higher fodmap sometimes you can consume small portions and still be ok and or try Siete Chips as they are gluten/wheat free.     Get organised at the start of the week - although meal prepping can be laborious, you will thank yourself for putting some extra time aside for snacks. See our snacks that require preparation below.      Have food handy for when you are on the move - you might have pre-packaged snacks ready in your bag or drawer at work (e.g. pack of mixed nuts, fruit or even jar of brazil nuts), or in the fridge at home (e.g. chopped up vegetables and dips).     Go for dairy - a low FODMAP diet does not mean a low dairy diet! Dairy is a great source of protein, calcium, and other vitamins/minerals with plenty of suitable lactose-free options available for those following a lactose-free diet. On-the-go pouch yoghurts (look for low sugar and monitor symptoms), as well as cheese & crackers are excellent choices for those who are busy and might find themselves snacking on the move.     Boost your fibre intake: eating fibre containing foods helps us to feel kinsey for longer. Keep the peels on the low FODMAP fruits and vegetables, sprinkle extra seeds (i.e. ayaan or kee) on your yoghurt, and consider wholegrain varieties where possible when choosing low FODMAP breads.     Consider your reusable food containers - gone are the days of simply taking a piece of fruit as a snack. These days, food storage containers come in a variety of shapes and sizes for all sorts of tasty snacks. This can be particularly helpful when preparing meals and snacks the night before a busy work or school day e.g. squeeze pouches for yoghurt if tolerated (now there is almond and coconut dairy free varieties) or containers with separate compartments for dip and  vegetables.     Snacks that require preparation:   Smoothies: green, summer berry, ariane peanut butter   Sweet: peanut butter bars, muesli bars, mixed berry & yoghurt granola bar  Savoury: savoury slice, vegetable muffins, rice paper roll, zucchini and rice slice  Energy balls: Oat & ariane chip, peanut butter protein balls, double ariane bliss balls    Snacks with minimal preparation:  Protein: Boiled eggs, tuna can, 20g mixed nuts or 10 almonds  Fruit: kiwi fruit, pineapple, mandarin, orange, firm banana, rockmelon/cantaloupe  Dairy & alternatives: yoghurt (lactose-free), hot chocolate made with drinking chocolate or latte made with lactose-free if required or soy milk (made from soy protein), cheese & crackers  Vegetables: vegetables with dip, pre-made sweet potato chips (baked with olive oil and a sprinkle of spices)  Gluten free toast or rice crackers with toppings: peanut butter, cheese, marmalade     Goal 4: Needs Improvement: Tracking what you eat. Writing down or tracking through an ahsan what you eat as well as how you feel can help you identify patterns in your symptoms. This can help you become more aware and create a diet that is right for you without over restricting.     mysymptoms ahsan, you can track symptoms, bowl movements, medications, stress, exercise, sleep and foods as well as beverages to become more mindful. Https://[employer]/wp/    Goal 5: Needs Improvement: Start probiotic lactic acid based by ruthy duarte MD either the 30 or 100 billion for at least 30-90 days let me know how you tolerate as it could be beneficial to add a soil based such as the following   https://ancientnutrition.com/products/sbo-probiotics-ultimate    Start one muli by pure encapsulations 1 cap per day with food and womens omega 3 by nordic naturals 2 cap per day with food     Calm Magnesium plus calcium powder add to some chamomile tea before or even in the smoothie 1 tsp and increase to 250-400mg (nice to try in the  future)     Start Pure Encapsulations Iron-C  Iron and Vitamin C Supplement to Support Muscle Function, Red Blood Cell Function, and Energy*  - 1 capsule daily with food in am with multivitamin     Or get the O.N.E. Multivitamin with Iron   Pure Encapsulations -    [employer] Naturals Evening Primrose oil - 2 caps daily with food (nice to try in the future)     Goal 6: Needs Improvement: Work on just meal planning check out platejoy.com or mealime.com. Focus on picking days to grocery shop and day to make a new meal. Focus on 3 dinner meals per week enough for leftovers. Set some days on calendar that you will have time to make the foods.     Look at green  or hello fresh to help with prepared meals.      What are FODMAPs?  FODMAP stands for fermentable oligo-, di-, monosaccharides and polyols    These short-chain carbs are resistant to digestion. Instead of being absorbed into your bloodstream, they reach the far end of your intestine, where most of your gut bacteria reside.    Your gut bacteria then use these carbs for fuel, producing hydrogen gas and causing digestive symptoms in sensitive individuals. FODMAPs also draw liquid into your intestine, which may cause diarrhea.    Although not everyone has a sensitivity to FODMAPs, it is very common among people with irritable bowel syndrome (IBS)     Common FODMAPs include:    Fructose: a simple sugar found in many fruits and vegetables that also makes up the structure of table sugar and most added sugars  Lactose: a carbohydrate found in dairy products like milk  Fructans: found in many foods, including grains like wheat, spelt, rye and barley  Galactans: found in large amounts in legumes  Polyols: sugar alcohols like xylitol, sorbitol, maltitol, and mannitol. They are found in some fruits and vegetables and often used as sweetener      What is the purpose of a FODMAP diet?  A FODMAP diet is a 3 step diet used to help manage digestive symptoms.  Common gut  problem with symptoms including abdominal (tummy) pain, bloating, wind (farting) and changes in bowel habit (diarrhea, constipation or both).     The aims of the diet are to:    Learn which foods and FODMAPs you tolerate, and which trigger your digestive symptoms. Understanding this will help you to follow a less restrictive, more nutritionally balanced diet for the long term that only restricts foods that trigger your digestive symptoms.    Assess whether your digestive symptoms are sensitive to FODMAPs. If you don't experience symptom improvement on the diet, than you may need to consider other digestive therapies.      How to follow a FODMAP diet      Stage 1: Restriction/Elimination   This stage involves strict avoidance of all high-FODMAP foods. It's important to note you should NOT avoid all FODMAPs long-term, and this stage should only last about 4-12 weeks. This is because it's important to include FODMAPs in the diet for gut health.      First restrict alcohol, caffeine, spicy foods and other common food triggers (wheat and dairy). Focus should be on eating whole, unprocessed real foods in their unprocessed forms such as fruits, vegetables, whole grains (prefer wheat/gluten free), nuts, extra virgin olive oil, herbs, moderate amounts of plant based proteins, and healthy fats. Monitor how you handle nightshades, corn, soy as these can be common triggers for digestive issues.     A low-fodmap diet is complex and should be tailored to your individual symptoms. Moving through the following stages over 4-12 weeks to provide more awareness as to what foods could be triggering symptoms.   Some people notice an improvement in symptoms in the first week, while others take the full eight - twelve weeks. Once you have adequate relief of your digestive symptoms, you can progress to the second stage.  If by eight to twelve weeks your gut symptoms have not resolved you should check for hidden sources of fodmaps, try  "probiotic/digestive enzymes and consider life stressors as stress is a major contributor.     The diet can be difficult to follow if you are not prepared. Here are some tips:  Try avoiding just wheat by going gluten free or taking out dairy first while you prepare to make other dietary changes to follow a low-FODMAP diet. Eliminating these two foods first and finding alternatives can make you feel less restricted before you begin to take out other foods.   Find out what to buy: Ensure you have access to credible low-FODMAP food lists. See handouts of where to find these.  Get rid of high-FODMAP foods: Clear your fridge and pantry of these foods.  Make a shopping list: Create a low-FODMAP shopping list before heading to the grocery store, so you know which foods to purchase or avoid.  Read menus in advance: Familiarize yourself with low-FODMAP menu options so you'll be prepared when dining out.    Stage 2: Reintroduction    This stage involves systematically reintroducing high-FODMAP foods. The purpose of this is twofold:   To identify which types of FODMAPs you tolerate. Few people are sensitive to all of them.  To establish the amount of FODMAPs you can tolerate. This is known as your \"threshold level.\"    In this step, you test specific foods one by one for three days each.  It is recommended that you undertake this step with a trained dietitian who can guide you through the appropriate foods. Alternatively, this ahsan can help you identify which foods to reintroduce. https://www.[employer]/get-ahsan-help/    It is worth noting that you need to continue a low-FODMAP diet throughout this stage. This means even if you can tolerate a certain high-FODMAP food, you must continue to restrict it until stage 3.  It is also important to remember that, unlike people with most food allergies, people with IBS can tolerate small amounts of FODMAPs.     Stage 3: Personalization  This stage is also known as the \"modified " "low-FODMAP diet.\" In other words, you still restrict some FODMAPs. However, the amount and type are tailored to your personal tolerance, identified in stage 2.  It is important to progress to this final stage in order to address nutrient deficiencies and increase diet variety and flexibility.    This step is aimed to relax dietary restrictions as much as possible, expand the variety of foods included in your diet and establish a  personalized FODMAP diet  for the long-term. In this step well tolerated foods and FODMAPs are reintroduced to your diet, while poorly tolerated foods and FODMAPs are restricted, but only to a level that provides symptom relief. We recommend that you repeat challenges of poorly tolerated foods and FODMAPs over time to see whether your tolerance changes. You can also use the Filter function in the [employer] FODMAP Hilda to personalize your hilda experience during Step 3 of the diet.      Foods high in FODMAPs  Here is a list of some common foods and ingredients that are high in FODMAPs:    Fruits: apples, applesauce, apricots, blackberries, boysenberries, canned fruit, cherries, dates, figs, peaches, pears, watermelon  Sweeteners: fructose, high fructose corn syrup, honey, maltitol, mannitol, sorbitol, xylitol  Dairy products: ice cream, milk (from cows, goats, and sheep), most yogurts, soft and fresh cheeses (cottage cheese, ricotta, etc.), sour cream, whey protein supplements  Vegetables: artichokes, asparagus, beetroot, broccoli, Dobson sprouts, cabbage, cauliflower, fennel, garlic, leeks, mushrooms, okra, onions, peas, shallots  Legumes: beans, baked beans, chickpeas, lentils, red kidney beans, soybeans  Wheat: biscuits, bread, most breakfast cereals, crackers, pancakes, pasta, tortillas, waffles  Other grains: barley, rye  Beverages: beer, fortified ryan, fruit juices, milk, soft drinks with high fructose corn syrup, soy milk      Foods you can eat on a low FODMAP diet  Keep in mind that " the purpose of this diet is not to completely eliminate FODMAPs, which is extremely difficult. Simply minimizing these types of carbs is considered sufficient to reduce digestive symptoms.    There is a wide variety of healthy and nutritious foods that you can eat on a low FODMAP diet, including    Meats, fish, and eggs (well tolerated unless they have added high FODMAP ingredients, like wheat or high fructose corn syrup)  All fats and oils  Most herbs and spices  Nuts and seeds (including almonds, peanuts, macadamia nuts, pine nuts, and sesame seeds but not pistachios or cashews, which are high in FODMAPs)    Fruits, such as:  unripe bananas  blueberries  cantaloupe  grapefruit  grapes  kiwi  yancy  lime  mandarins  melons (except watermelon)  oranges  passionfruit  raspberries  strawberries  sweeteners (maple syrup, molasses, and stevia)  dairy products if they are lactose-free as well as hard cheeses and aged softer varieties (like Brie and Camembert)    Vegetables, such as:  alfalfa  bell peppers  bok flower  carrots  celery  chives  cucumbers  eggplant  elma  green beans  kale  lettuce  olives  parsnips  potatoes  radishes  spinach  spring onion (only green)  squash  sweet potatoes  tomatoes  turnips  water chestnuts  yams  zucchini    Grains, such as:  corn  oats  quinoa  rice  sorghum  tapioca  beverages (water, coffee, tea, etc)    However, keep in mind that these lists are neither definitive nor exhaustive. Naturally, there are foods not listed here that are either high or low in FODMAPs.     In addition, everyone is different. You may tolerate some foods on the list of foods to avoid while noticing digestive symptoms from foods low in FODMAPs for other reasons.  How much of a food you eat will affect how likely you are to experience symptoms if you have IBS. The individual tolerance to FODMAPs varies.      NUTRITION RESOURCES:  1. Core Food Plan   2.  [place] FODMAP Website, Hilda and Handout- 3 step FODMAP  diet guide & food list  3. Low FODMAP Smoothie Recipes         21 Day Low FODMAP Smoothie Challenge (21 Day Low FODMAP Challenges Book 1) Gagan Edition by Aria Bedoya        Low FODMAP Smoothies Recipe Book: A Beginners Guide to Low FODMAP Smoothies for Gut Health  4. The Low-FODMAP Diet for Beginners: A 7-Day Plan to Beat Bloat and Soothe Your Gut with Recipes for Fast IBS Relief by Edie Mcginnis  (Author), Patricia Choi RDN-AP LD [facility]  (Author), Regulo Najera MD PhD         PATIENT'S BEHAVIOR CHANGE GOALS:   See nutrition intervention for patient stated behavior change goals.       MONITOR / EVALUATE:  Registered Dietitian will monitor/evaluate the following:   Beliefs and attitudes related to food  Food and nutrition knowledge / skills  Food / Beverage / Nutrient intake   Pertinent Labs  Progress toward meeting stated nutrition-related goals  Readiness to change nutrition-related behaviors  Weight change  Digestion     COORDINATION OF CARE:  Follow up with gastroenterology      FOLLOW-UP:  Follow up with RD recommended in 12 weeks.     Time spent in minutes: 52 minutes 3 units   Encounter: Individual    Aurora Briseno, RD, CLT, LD  Integrative Registered Dietitian

## 2023-12-21 ENCOUNTER — TELEPHONE (OUTPATIENT)
Dept: NUTRITION | Facility: CLINIC | Age: 23
End: 2023-12-21
Payer: COMMERCIAL

## 2024-01-07 ENCOUNTER — MYC REFILL (OUTPATIENT)
Dept: OBGYN | Facility: CLINIC | Age: 24
End: 2024-01-07
Payer: COMMERCIAL

## 2024-01-07 DIAGNOSIS — N76.0 BACTERIAL VAGINOSIS: ICD-10-CM

## 2024-01-07 DIAGNOSIS — B96.89 BACTERIAL VAGINOSIS: ICD-10-CM

## 2024-01-07 RX ORDER — METRONIDAZOLE 7.5 MG/G
GEL VAGINAL
Qty: 70 G | Refills: 11 | Status: CANCELLED | OUTPATIENT
Start: 2024-01-07

## 2024-02-15 ENCOUNTER — OFFICE VISIT (OUTPATIENT)
Dept: FAMILY MEDICINE | Facility: CLINIC | Age: 24
End: 2024-02-15
Payer: COMMERCIAL

## 2024-02-15 VITALS
BODY MASS INDEX: 25.62 KG/M2 | DIASTOLIC BLOOD PRESSURE: 72 MMHG | SYSTOLIC BLOOD PRESSURE: 122 MMHG | HEART RATE: 85 BPM | TEMPERATURE: 97.7 F | WEIGHT: 173 LBS | HEIGHT: 69 IN | OXYGEN SATURATION: 96 %

## 2024-02-15 DIAGNOSIS — R87.610 ATYPICAL SQUAMOUS CELLS OF UNDETERMINED SIGNIFICANCE (ASCUS) ON PAPANICOLAOU SMEAR OF CERVIX: ICD-10-CM

## 2024-02-15 DIAGNOSIS — N94.6 DYSMENORRHEA: ICD-10-CM

## 2024-02-15 DIAGNOSIS — Z00.00 ROUTINE GENERAL MEDICAL EXAMINATION AT A HEALTH CARE FACILITY: Primary | ICD-10-CM

## 2024-02-15 DIAGNOSIS — K21.9 GASTROESOPHAGEAL REFLUX DISEASE WITHOUT ESOPHAGITIS: ICD-10-CM

## 2024-02-15 PROBLEM — Z97.5 NEXPLANON IN PLACE: Status: RESOLVED | Noted: 2019-10-11 | Resolved: 2024-02-15

## 2024-02-15 PROCEDURE — 99395 PREV VISIT EST AGE 18-39: CPT | Performed by: STUDENT IN AN ORGANIZED HEALTH CARE EDUCATION/TRAINING PROGRAM

## 2024-02-15 RX ORDER — FAMOTIDINE 20 MG/1
20 TABLET, FILM COATED ORAL 2 TIMES DAILY PRN
Qty: 60 TABLET | Refills: 5 | Status: SHIPPED | OUTPATIENT
Start: 2024-02-15

## 2024-02-15 SDOH — HEALTH STABILITY: PHYSICAL HEALTH: ON AVERAGE, HOW MANY DAYS PER WEEK DO YOU ENGAGE IN MODERATE TO STRENUOUS EXERCISE (LIKE A BRISK WALK)?: 5 DAYS

## 2024-02-15 SDOH — HEALTH STABILITY: PHYSICAL HEALTH: ON AVERAGE, HOW MANY MINUTES DO YOU ENGAGE IN EXERCISE AT THIS LEVEL?: 90 MIN

## 2024-02-15 ASSESSMENT — ASTHMA QUESTIONNAIRES

## 2024-02-15 ASSESSMENT — SOCIAL DETERMINANTS OF HEALTH (SDOH): HOW OFTEN DO YOU GET TOGETHER WITH FRIENDS OR RELATIVES?: ONCE A WEEK

## 2024-02-15 NOTE — PROGRESS NOTES
"Preventive Care Visit  [facility]  Prisca Norwood DO, Family Medicine  Feb 15, 2024    Assessment & Plan     Routine general medical examination at a health care facility  Declined covid. Had flu at work     Gastroesophageal reflux disease without esophagitis  Has been using PPI intermittently. Recommend switching to pepcid BID PRN. Continue with food avoidance.   - famotidine (PEPCID) 20 MG tablet; Take 1 tablet (20 mg) by mouth 2 times daily as needed (heartburn)    Atypical squamous cells of undetermined significance (ASCUS) on Papanicolaou smear of cervix  Reviewed previous pap history. Has had ASCUS on/off since 2020 with colposcopy CIN1. Needs annual pap.     Dysmenorrhea  Previously on various forms on birth control for this but stopped birth control 2 months ago due to recurrent BV to see if this will help her symptoms. She also has a repeat wet prep and ureaplasma and mycloplasm a test pended by her OBGYN. Some recurrence of dysmenorrhea but overall would like to continue without for longer period to monitor BV        BMI  Estimated body mass index is 25.92 kg/m  as calculated from the following:    Height as of this encounter: 1.74 m (5' 8.5\").    Weight as of this encounter: 78.5 kg (173 lb).   Weight management plan: Discussed healthy diet and exercise guidelines    Counseling  Appropriate preventive services were discussed with this patient, including applicable screening as appropriate for fall prevention, nutrition, physical activity, Tobacco-use cessation, weight loss and cognition.  Checklist reviewing preventive services available has been given to the patient.  Reviewed patient's diet, addressing concerns and/or questions.     Patient has been advised of split billing requirements and indicates understanding: Yes        Subjective   Hipolito is a 23 year old, presenting for the following:  Physical        2/15/2024     6:50 AM   Additional Questions   Accompanied by na "         2/15/2024     6:50 AM   Patient Reported Additional Medications   Patient reports taking the following new medications none        Health Care Directive  Patient does not have a Health Care Directive or Living Will: Discussed advance care planning with patient; however, patient declined at this time.    HPI      Recurrent BV. Stopped birth control a couple months ago for a trial to see if this predisposed her to BV. First period was very painful. Cycles have been regular, lasting 5-7 days. Experiencing more acne,cramps, bloating. In past tried nexplanon twice, IUD (BV got really bad with this), birth control (gained 10-15 lbs). Using boric acid intermittently.     On/off ASCUS. Over the past 4 years.     Hasn't been taking omeprazole intermittently - typically     Has seen dietician.     Not taking vitamin D right now except for in multivitamin    Got flu vaccine from [employer]     Exercise 5 times/week, cardio, weight lifting and yoga     Gluten reduced preference recommended by nutritionist               2/15/2024   General Health   How would you rate your overall physical health? (!) FAIR   Feel stress (tense, anxious, or unable to sleep) Not at all         2/15/2024   Nutrition   Three or more servings of calcium each day? Yes   Diet: Gluten-free/reduced   How many servings of fruit and vegetables per day? (!) 2-3   How many sweetened beverages each day? 0-1         2/15/2024   Exercise   Days per week of moderate/strenous exercise 5 days   Average minutes spent exercising at this level 90 min         2/15/2024   Social Factors   Frequency of gathering with friends or relatives Once a week   Worry food won't last until get money to buy more No   Food not last or not have enough money for food? No   Do you have housing?  Yes   Are you worried about losing your housing? No   Lack of transportation? No   Unable to get utilities (heat,electricity)? No         2/15/2024   Dental   Dentist two times every year? Yes          2/15/2024   TB Screening   Were you born outside of [place]?  No         Today's PHQ-2 Score:       2/15/2024     6:39 AM   PHQ-2 ( 1999 [employer])   Q1: Little interest or pleasure in doing things 0   Q2: Feeling down, depressed or hopeless 0   PHQ-2 Score 0   Q1: Little interest or pleasure in doing things Not at all   Q2: Feeling down, depressed or hopeless Not at all   PHQ-2 Score 0           2/15/2024   Substance Use   Alcohol more than 3/day or more than 7/wk No   Do you use any other substances recreationally? No     Social History     Tobacco Use    Smoking status: Never    Smokeless tobacco: Never    Tobacco comments:     no smokers at home   Substance Use Topics    Alcohol use: Yes     Comment: 0-2 per month     Drug use: No           2/15/2024   STI Screening   New sexual partner(s) since last STI/HIV test? No     History of abnormal Pap smear: YES - updated in Problem List and Health Maintenance accordingly  Last 3 Pap and HPV Results:       11/24/2023    10:45 AM 10/10/2022     7:50 AM 8/26/2021     3:25 PM   PAP / HPV   PAP Atypical squamous cells of undetermined significance (ASC-US)  Atypical squamous cells of undetermined significance (ASC-US)  Negative for Intraepithelial Lesion or Malignancy (NILM)            11/24/2023    10:45 AM 10/10/2022     7:50 AM 8/26/2021     3:25 PM   PAP / HPV   PAP Atypical squamous cells of undetermined significance (ASC-US)  Atypical squamous cells of undetermined significance (ASC-US)  Negative for Intraepithelial Lesion or Malignancy (NILM)            2/15/2024   Contraception/Family Planning   Questions about contraception or family planning (!) YES recently stopped         Reviewed and updated as needed this visit by Provider                    Past Medical History:   Diagnosis Date    Abnormal Pap smear of cervix 08/12/2020    see problem list    Asthma, mild persistent     Concussion 1/2012    Impact test 3/12    Mononucleosis 06/2019     Past Surgical History:    Procedure Laterality Date    ARTHROSCOPIC RECONSTRUCTION ANTERIOR CRUCIATE LIGAMENT  12    left    ESOPHAGOSCOPY, GASTROSCOPY, DUODENOSCOPY (EGD), COMBINED N/A 2016    Procedure: COMBINED ESOPHAGOSCOPY, GASTROSCOPY, DUODENOSCOPY (EGD), BIOPSY SINGLE OR MULTIPLE;  Surgeon: Laurie Pride MD;  Location: [facility]     HC TOOTH EXTRACTION W/FORCEP       OB History    Para Term  AB Living   0 0 0 0 0 0   SAB IAB Ectopic Multiple Live Births   0 0 0 0 0   Obstetric Comments   Not currently sexually active.      Lab work is in process  Labs reviewed in EPIC  BP Readings from Last 3 Encounters:   02/15/24 122/72   23 135/70   23 (!) 131/90    Wt Readings from Last 3 Encounters:   02/15/24 78.5 kg (173 lb)   23 74.8 kg (165 lb)   23 76 kg (167 lb 9.6 oz)                  Patient Active Problem List   Diagnosis    Environmental allergies    Mild persistent asthma    Scoliosis    Leg length discrepancy    Torn ACL    Chronic rhinitis    Tinea versicolor    Dysmenorrhea    Other acne    Chest wall pain    Oropharyngeal dysphagia    Exercise-induced asthma    Nexplanon in place    Atypical squamous cells of undetermined significance (ASCUS) on Papanicolaou smear of cervix    Chronic pain of left knee    Chronic pain of right knee     Past Surgical History:   Procedure Laterality Date    ARTHROSCOPIC RECONSTRUCTION ANTERIOR CRUCIATE LIGAMENT  13    left    ESOPHAGOSCOPY, GASTROSCOPY, DUODENOSCOPY (EGD), COMBINED N/A 2016    Procedure: COMBINED ESOPHAGOSCOPY, GASTROSCOPY, DUODENOSCOPY (EGD), BIOPSY SINGLE OR MULTIPLE;  Surgeon: Laurie Pride MD;  Location: [facility]     HC TOOTH EXTRACTION W/FORCEP         Social History     Tobacco Use    Smoking status: Never    Smokeless tobacco: Never    Tobacco comments:     no smokers at home   Substance Use Topics    Alcohol use: Yes     Comment: 0-2 per month      Family History    Problem Relation Age of Onset    Diabetes Mother         gestational     GERD Mother     Gallbladder Disease Mother     Diabetes Maternal Grandmother     Cancer Maternal Grandmother         ovarian and uterine    Hypertension Father     Lipids Father     Hypertension Paternal Grandfather     Family History Negative No family hx of     Asthma No family hx of         Eosinophilic esophagitis.    C.A.D. No family hx of     Breast Cancer No family hx of     Cerebrovascular Disease No family hx of     Cancer - colorectal No family hx of     Prostate Cancer No family hx of     Alcohol/Drug No family hx of     Allergies No family hx of     Alzheimer Disease No family hx of     Anesthesia Reaction No family hx of     Arthritis No family hx of     Blood Disease No family hx of     Cardiovascular No family hx of     Circulatory No family hx of     Congenital Anomalies No family hx of     Connective Tissue Disorder No family hx of     Depression No family hx of     Endocrine Disease No family hx of     Eye Disorder No family hx of     Genetic Disorder No family hx of     Gastrointestinal Disease No family hx of     Genitourinary Problems No family hx of     Gynecology No family hx of     Musculoskeletal Disorder No family hx of     Anxiety Disorder No family hx of     Mental Illness No family hx of     Substance Abuse No family hx of     Colon Cancer No family hx of     Other Cancer No family hx of     Thyroid Disease No family hx of     Obesity No family hx of     Osteoporosis No family hx of     Unknown/Adopted No family hx of     Hyperlipidemia No family hx of     Coronary Artery Disease No family hx of     Other - See Comments No family hx of          Current Outpatient Medications   Medication Sig Dispense Refill    Cholecalciferol (VITAMIN D) 1000 UNITS capsule Take 1 capsule by mouth daily      fish oil-omega-3 fatty acids 500 MG capsule Take 500 mg by mouth daily      lactobacillus rhamnosus, GG, (CULTURELL) capsule  "Take 1 capsule by mouth daily      Multiple Vitamin (MULTIVITAMINS PO) Take 1 tablet by mouth daily.      omeprazole (PRILOSEC) 40 MG DR capsule Take 1 capsule (40 mg) by mouth every morning On an empty stomach about 30 minutes before breakfast 90 capsule 0    metroNIDAZOLE (METROGEL) 0.75 % vaginal gel Apply vaginally twice weekly for 4-6 months. 70 g 11     No Known Allergies      Review of Systems  Constitutional, HEENT, cardiovascular, pulmonary, gi and gu systems are negative, except as otherwise noted.     Objective    Exam  /72 (Patient Position: Sitting, Cuff Size: Adult Regular)   Pulse 85   Temp 97.7  F (36.5  C) (Oral)   Ht 1.74 m (5' 8.5\")   Wt 78.5 kg (173 lb)   LMP 02/07/2024   SpO2 96%   BMI 25.92 kg/m     Estimated body mass index is 25.92 kg/m  as calculated from the following:    Height as of this encounter: 1.74 m (5' 8.5\").    Weight as of this encounter: 78.5 kg (173 lb).    Physical Exam  GENERAL: alert and no distress  EYES: Eyes grossly normal to inspection, PERRL and conjunctivae and sclerae normal  HENT: ear canals and TM's normal, nose and mouth without ulcers or lesions  NECK: no adenopathy, no asymmetry, masses, or scars  RESP: lungs clear to auscultation - no rales, rhonchi or wheezes  CV: regular rate and rhythm, normal S1 S2, no S3 or S4, no murmur, click or rub, no peripheral edema  ABDOMEN: soft, nontender, no hepatosplenomegaly, no masses and bowel sounds normal  MS: no gross musculoskeletal defects noted, no edema  SKIN: no suspicious lesions or rashes  NEURO: Normal strength and tone, mentation intact and speech normal  PSYCH: mentation appears normal, affect normal/bright      Signed Electronically by: Prisca Norwood,     "

## 2024-02-15 NOTE — PATIENT INSTRUCTIONS
Preventive Care Advice   This is general advice given by our system to help you stay healthy. However, your care team may have specific advice just for you. Please talk to your care team about your preventive care needs.  Nutrition  Eat 5 or more servings of fruits and vegetables each day.  Try wheat bread, brown rice and whole grain pasta (instead of white bread, rice, and pasta).  Get enough calcium and vitamin D. Check the label on foods and aim for 100% of the RDA (recommended daily allowance).  Lifestyle  Exercise at least 150 minutes each week  (30 minutes a day, 5 days a week).  Do muscle strengthening activities 2 days a week. These help control your weight and prevent disease.  No smoking.  Wear sunscreen to prevent skin cancer.  Have a dental exam and cleaning every 6 months.  Yearly exams  See your health care team every year to talk about:  Any changes in your health.  Any medicines your care team has prescribed.  Preventive care, family planning, and ways to prevent chronic diseases.  Shots (vaccines)   HPV shots (up to age 26), if you've never had them before.  Hepatitis B shots (up to age 59), if you've never had them before.  COVID-19 shot: Get this shot when it's due.  Flu shot: Get a flu shot every year.  Tetanus shot: Get a tetanus shot every 10 years.  Pneumococcal, hepatitis A, and RSV shots: Ask your care team if you need these based on your risk.  Shingles shot (for age 50 and up)  General health tests  Diabetes screening:  Starting at age 35, Get screened for diabetes at least every 3 years.  If you are younger than age 35, ask your care team if you should be screened for diabetes.  Cholesterol test: At age 39, start having a cholesterol test every 5 years, or more often if advised.  Bone density scan (DEXA): At age 50, ask your care team if you should have this scan for osteoporosis (brittle bones).  Hepatitis C: Get tested at least once in your life.  STIs (sexually transmitted  infections)  Before age 24: Ask your care team if you should be screened for STIs.  After age 24: Get screened for STIs if you're at risk. You are at risk for STIs (including HIV) if:  You are sexually active with more than one person.  You don't use condoms every time.  You or a partner was diagnosed with a sexually transmitted infection.  If you are at risk for HIV, ask about PrEP medicine to prevent HIV.  Get tested for HIV at least once in your life, whether you are at risk for HIV or not.  Cancer screening tests  Cervical cancer screening: If you have a cervix, begin getting regular cervical cancer screening tests starting at age 21.  Breast cancer scan (mammogram): If you've ever had breasts, begin having regular mammograms starting at age 40. This is a scan to check for breast cancer.  Colon cancer screening: It is important to start screening for colon cancer at age 45.  Have a colonoscopy test every 10 years (or more often if you're at risk) Or, ask your provider about stool tests like a FIT test every year or Cologuard test every 3 years.  To learn more about your testing options, visit:   https://www.[email or web address]/779570.pdf.  For help making a decision, visit:   https://bit.ly/go08824.  Prostate cancer screening test: If you have a prostate, ask your care team if a prostate cancer screening test (PSA) at age 55 is right for you.  Lung cancer screening: If you are a current or former smoker ages 50 to 80, ask your care team if ongoing lung cancer screenings are right for you.  For informational purposes only. Not to replace the advice of your health care provider. Copyright   2023 [employer]. All rights reserved. Clinically reviewed by the [facility] Transitions Program. [employer] [id number] - REV 01/24.

## 2024-03-27 ENCOUNTER — VIRTUAL VISIT (OUTPATIENT)
Dept: GASTROENTEROLOGY | Facility: CLINIC | Age: 24
End: 2024-03-27
Attending: PHYSICIAN ASSISTANT
Payer: COMMERCIAL

## 2024-03-27 DIAGNOSIS — K21.9 GASTROESOPHAGEAL REFLUX DISEASE, UNSPECIFIED WHETHER ESOPHAGITIS PRESENT: ICD-10-CM

## 2024-03-27 DIAGNOSIS — K21.9 LPRD (LARYNGOPHARYNGEAL REFLUX DISEASE): ICD-10-CM

## 2024-03-27 DIAGNOSIS — K58.2 IRRITABLE BOWEL SYNDROME WITH BOTH CONSTIPATION AND DIARRHEA: Primary | ICD-10-CM

## 2024-03-27 PROCEDURE — 99214 OFFICE O/P EST MOD 30 MIN: CPT | Mod: 95 | Performed by: PHYSICIAN ASSISTANT

## 2024-03-27 NOTE — NURSING NOTE
Is the patient currently in the state of [place]? YES    Visit mode:VIDEO    If the visit is dropped, the patient can be reconnected by: VIDEO VISIT: Text to cell phone:   Telephone Information:   Mobile [phone]       Will anyone else be joining the visit? NO  (If patient encounters technical issues they should call [phone]    How would you like to obtain your AVS? MyChart    Are changes needed to the allergy or medication list? No    Reason for visit: RECHECK    Yanet PINEDA

## 2024-03-27 NOTE — PROGRESS NOTES
GI FOLLOW UP VISIT     HPI: Arlen William is 23 year old female who presents for follow up. We initially visited in August 2023.      Initial consult HPI 8/7/2023  She reports that her symptoms have been going on for a few years.  She recalls irregular bowel habits happening when she joined the  in 2018 which she relates to the type of meal she was eating.     Her symptoms exacerbated in 2021.  She started to have alternating bowel movements between constipation or extreme diarrhea.  She never feels regular.  She has noticed that symptoms are worse during times of stress.  She has not been able to determine any dietary triggers and has not tried any elimination diets.  She can experience 2-3 urgent loose stools with diarrhea followed by 1 to 2 days of no bowel movements.  When constipated bowel movements are hard and difficult to pass and require straining.  She does feel that she is more often constipated than with diarrhea.  She has occasionally seen small amounts of bright red blood per rectum which she suspects could be a hemorrhoid.  She otherwise denies any hematochezia or melena.  She does have some bloating and discomfort with constipation which usually improves after having bowel movements.  She otherwise does not have much in terms of abdominal pain.  She was given Colace to take during times of constipation but she feels that this led to further diarrhea. She does drink lots of water. She is usually active but this has decreased.      She also reports having heartburn since childhood.  She primarily notes that at night when laying down to sleep.  She does not eat too close to bedtime.  She usually has dinner around 5 or 6 pm and goes to bed by 10 PM.  She does not snack late. She has tried sleeping elevated but is not very comfortable.  She was given Prilosec to try last year.  She tried this for about 3 weeks and did not see an improvement so she discontinued as she prefers not to take  medications.  There is occasional nausea.  She denies vomiting.  No dysphagia.  No odynophagia.  She does experience phlegm most mornings.  Had previously met with an ENT and was told this could be related to reflux as well. She does not use NSAID's. She also reports feeling very fatigued and exhausted. Recent labs note unremarkable cbc, cmp, TSH, free T4 and ESR.      Interval hx 12/7/2023  She returns today for follow up. We completed a number of labs and stool studies at time of initial visit. 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 Treated with abx given her persistent symptoms. She did see some improvement afterwards. Labs noted a low normal b12 and low iron saturation, so advised to supplement. Remainder of work was unremarkable including negative celiac screening. She continues to feel fatigue and exhausted. BM's have improved consistency. She is also on probiotics and fish oil which she feels is helping. She is also on prilosec which helps with reflux symptoms. No hematochezia. No melena. Does have heavy periods.      She does not smoke.  She may have 1 drink every few weeks.       Interval hx 3/27/2024  Since her last visit she has met with nutritionist and is working on low FODMAP diet. She was on intermittent use of PPI for her GERD/LPRD. Her primary care switched this to pepcid BID prn. Overall managing her symptoms well at this time.       ALLERGIES:Patient has no known allergies.       PERTINENT MEDICATIONS:  Current Outpatient Medications   Medication    Cholecalciferol (VITAMIN D) 1000 UNITS capsule    famotidine (PEPCID) 20 MG tablet    fish oil-omega-3 fatty acids 500 MG capsule    lactobacillus rhamnosus, GG, (CULTURELL) capsule    Multiple Vitamin (MULTIVITAMINS PO)     No current facility-administered medications for this visit.      PHYSICAL EXAMINATION:  Constitutional: aaox3, cooperative, pleasant, not dyspneic/diaphoretic, no acute distress  GENERAL: alert and no distress  EYES:  Eyes grossly normal to inspection.  No discharge or erythema, or obvious scleral/conjunctival abnormalities.  RESP: No audible wheeze, cough, or visible cyanosis.    SKIN: Visible skin clear. No significant rash, abnormal pigmentation or lesions.  NEURO: Cranial nerves grossly intact.  Mentation and speech appropriate for age.  PSYCH: Appropriate affect, tone, and pace of words      ASSESSMENT/PLAN:    # Irritable bowel syndrome   # GERD/LPRD     Arlen William is a 23 year old female who presents for follow up. She has a several year history of alternating bowel movements. Her work up includes negative celiac screening, serum and stool inflammatory markers. We did find cryptosporidium and EAEC on stool cx which we treated with abx given her persistent symptoms. This did lead to an improvement in symptoms. Since then she has met with nutritionist and is working on an elimination diet. She was previously on PPI intermittently for GERD/LPRD. Recently switched to pepcid 20mg BID prn. She reports overall doing well at this time.         Thank you for this consultation.  It was a pleasure to participate in the care of this patient; please contact us with any further questions.        This note was created with voice recognition software, and while reviewed for accuracy, typos may remain.       I spent a total of 32 minutes on the day of the visit.   Time spent by me doing chart review, history and exam, documentation and further activities per the note      Lee Ann Carbajal PA-C  Division of Gastroenterology, Hepatology and Nutrition   [facility]            Video-Visit Details    Video Start Time: 3:30 PM    Type of service:  Video Visit    Video End Time:3:56 PM    Originating Location (pt. Location): Home    Distant Location (provider location):  Off-site    Platform used for Video Visit: Daisy

## 2024-04-04 ENCOUNTER — MYC MEDICAL ADVICE (OUTPATIENT)
Dept: GASTROENTEROLOGY | Facility: CLINIC | Age: 24
End: 2024-04-04
Payer: COMMERCIAL

## 2024-04-04 NOTE — LETTER
"  To Whom It May Concern,      I am writing this letter of medical necessity in regards to the recent denial of 'calprotectin feces' or 'fecal calprotectin.' Arlen William is a pleasant woman who unfortunately suffers from gastrointestinal symptoms. This laboratory test (stool sample) was ordered as part of her evaluation to help differentiate her symptoms and rule out a concern for an inflammatory bowel disease such as crohn's or ulcerative colitis.     According to the article titled  'Practical guidance on the use of faecal calprotectin,' published in the [facility] Journal: \"Faecal calprotectin, in both adults and children, has been shown to have high sensitivity and specificity for differentiating between IBD and functional gastrointestinal disorders.\"(1) This was also noted in 'Faecal calprotectin' published in The Clinical  Reviews. \"Several studies have investigated the value of faecal calprotectin in distinguishing organic from non-organic intestinal disease in symptomatic patients, suggesting its potential role in the diagnosis of IBD.\" (2) It is therefore a useful test to utilize in patients whose symptoms may overlap both conditions.     Lastly, the U.S. Food and Drug Administration approves the use of fecal calprotectin to differentiate between IBD and irritable bowel syndrome (IBS). According to Title 21, volume 8. Part 866--immunology and microbiology devices, subpart F-- immunological test systems, sec. 866.5180 fecal calprotectin immunological test system: \"A fecal calprotectin immunological test system is an in vitro diagnostic device that consists of reagents used to quantitatively measure, by immunochemical techniques, fecal calprotectin in human stool specimens. The device is intended forin vitro diagnostic use as an aid in the diagnosis of inflammatory bowel diseases (IBD), specifically Crohn's disease and ulcerative colitis, and as an aid in differentiation of " "IBD from irritable bowel syndrome.\"(3)    We are therefore requesting that you approve and cover the 'calprotectin feces' also known as 'fecal calprotectin' as this was an essential, useful and supported part of her assessment and evaluation. We thank you for your immediate attention to this issue and we would appreciate haste in your determination.        Sincerely,      Lee Ann Carbajal PA-C  Division of Gastroenterology, Hepatology and Nutrition   [facility]      Earl BEAUCHAMP, Lalitha S, Prasanna TUTTLE, Carroll AVILEZ. Practical guidance on the use of  faecal calprotectin. [facility]. 2018 Apr;9(2):87-91. doi: 10.1136/[id number]. Epub 2017 Feb 22. PMID: 95429379; PMCID: [id number].     Emily COBBW, Chung SP, Micaela MJ, Franci SD. Faecal Calprotectin. Clin Biochem Rev. 2018 Aug;39(3):77-90. PMID: 13907722; PMCID: [id number].     U.S. Food and Drug Administration. Title 21, volume 8. Part 866--immunology and microbiology devices, subpart F-- immunological test systems, sec. 866.5180 fecal calprotectin immunological test system. December 22, 2023. Accessed April 8, 2024. https://www.accessdata.fda.gov/scripts/cdrh/cfdocs/cfcfr/CFRSearch.cfm?oq=065.5180  "

## 2024-04-04 NOTE — LETTER
"    To Whom It May Concern,      I am writing this letter of medical necessity in regards to the recent denial of 'calprotectin feces' or 'fecal calprotectin.' Arlen William is a pleasant woman who unfortunately suffers from gastrointestinal symptoms. This laboratory test (stool sample) was ordered as part of her evaluation to help differentiate her symptoms and rule out a concern for an inflammatory bowel disease such as crohn's or ulcerative colitis.     According to the article titled  'Practical guidance on the use of faecal calprotectin,' published in the [facility] Journal: \"Faecal calprotectin, in both adults and children, has been shown to have high sensitivity and specificity for differentiating between IBD and functional gastrointestinal disorders.\" This was also noted in 'Faecal calprotectin' published in The Clinical  Reviews. \"Several studies have investigated the value of faecal calprotectin in distinguishing organic from non-organic intestinal disease in symptomatic patients, suggesting its potential role in the diagnosis of IBD.\" It is therefore a useful test to utilize in patients whose symptoms may overlap both conditions.     Lastly, the U.S. Food and Drug Administration approves the use of fecal calprotectin to differentiate between IBD and irritable bowel syndrome (IBS). According to Title 21, volume 8. Part 866--immunology and microbiology devices, subpart F-- immunological test systems, sec. 866.5180 fecal calprotectin immunological test system: \"A fecal calprotectin immunological test system is an in vitro diagnostic device that consists of reagents used to quantitatively measure, by immunochemical techniques, fecal calprotectin in human stool specimens. The device is intended forin vitro diagnostic use as an aid in the diagnosis of inflammatory bowel diseases (IBD), specifically Crohn's disease and ulcerative colitis, and as an aid in differentiation of IBD " "from irritable bowel syndrome.\"     We are therefore requesting that you approve and cover the 'calprotectin feces' also known as 'fecal calprotectin' as this was an essential, useful and supported part of her assessment and evaluation. We thank you for your immediate attention to this issue and we would appreciate haste in your determination.        Sincerely,      Lee Ann Carbajal PA-C  Division of Gastroenterology, Hepatology and Nutrition   [facility]     Earl BEAUCHAMP, Lalitha LOMAX, Prasanna TUTTLE, Carroll AVILEZ. Practical guidance on the use of  faecal calprotectin. [facility]. 2018 Apr;9(2):87-91. doi: 10.1136/[id number]. Epub 2017 Feb 22. PMID: 95020779; PMCID: [id number].    Emily COBBW, Chung SP, Micaela MJ, Franci SD. Faecal Calprotectin. Clin Biochem Rev. 2018 Aug;39(3):77-90. PMID: 68045925; PMCID: [id number].    U.S. Food and Drug Administration. Title 21, volume 8. Part 866--immunology and microbiology devices, subpart F-- immunological test systems, sec. 866.5180 fecal calprotectin immunological test system. December 22, 2023. Accessed April 8, 2024. https://www.accessdata.fda.gov/scripts/cdrh/cfdocs/cfcfr/CFRSearch.cfm?es=015.5180      "

## 2024-04-08 NOTE — TELEPHONE ENCOUNTER
Per request from [facility] Appeal letter written by Lee Ann Carbajal   Mailed to Address:  Claims Appeal Unit  [address] [address]   [place] [address]

## 2024-04-19 ENCOUNTER — LAB (OUTPATIENT)
Dept: LAB | Facility: CLINIC | Age: 24
End: 2024-04-19
Payer: COMMERCIAL

## 2024-04-19 DIAGNOSIS — N89.8 VAGINAL IRRITATION: ICD-10-CM

## 2024-04-19 DIAGNOSIS — Z11.3 SCREENING EXAMINATION FOR STI: ICD-10-CM

## 2024-04-19 PROCEDURE — 87210 SMEAR WET MOUNT SALINE/INK: CPT

## 2024-04-19 PROCEDURE — 87591 N.GONORRHOEAE DNA AMP PROB: CPT

## 2024-04-19 PROCEDURE — 87491 CHLMYD TRACH DNA AMP PROBE: CPT

## 2024-04-20 DIAGNOSIS — B96.89 BACTERIAL VAGINOSIS: Primary | ICD-10-CM

## 2024-04-20 DIAGNOSIS — N76.0 BACTERIAL VAGINOSIS: Primary | ICD-10-CM

## 2024-04-20 LAB
C TRACH DNA SPEC QL PROBE+SIG AMP: NEGATIVE
N GONORRHOEA DNA SPEC QL NAA+PROBE: NEGATIVE

## 2024-04-20 RX ORDER — METRONIDAZOLE 7.5 MG/G
1 GEL VAGINAL AT BEDTIME
Qty: 70 G | Refills: 0 | Status: SHIPPED | OUTPATIENT
Start: 2024-04-20 | End: 2024-04-25

## 2024-04-23 NOTE — TELEPHONE ENCOUNTER
[facility] Call Center    Phone Message    May a detailed message be left on voicemail: yes     Reason for Call: Other: Patient calling to see if letter from Elaina Carbajal was sent . Please call patient to discuss.      Action Taken: Message routed to:  [facility] ([facility] GI    Travel Screening: Not Applicable

## 2024-05-02 NOTE — TELEPHONE ENCOUNTER
Per request from [facility] Appeal letter written by Lee Ann Carbajal sent again  Mailed to Address:   [facility] Claims Appeal Unit  [address] [address]   [place] [address]

## 2024-06-25 ENCOUNTER — OFFICE VISIT (OUTPATIENT)
Dept: MIDWIFE SERVICES | Facility: CLINIC | Age: 24
End: 2024-06-25
Payer: COMMERCIAL

## 2024-06-25 VITALS
BODY MASS INDEX: 25.2 KG/M2 | WEIGHT: 168.2 LBS | OXYGEN SATURATION: 97 % | SYSTOLIC BLOOD PRESSURE: 140 MMHG | HEART RATE: 70 BPM | DIASTOLIC BLOOD PRESSURE: 86 MMHG

## 2024-06-25 DIAGNOSIS — N89.8 VAGINAL ITCHING: Primary | ICD-10-CM

## 2024-06-25 DIAGNOSIS — Z30.011 INITIATION OF ORAL CONTRACEPTION: ICD-10-CM

## 2024-06-25 LAB
CLUE CELLS: ABNORMAL
HCG UR QL: NEGATIVE
TRICHOMONAS, WET PREP: ABNORMAL
WBC'S/HIGH POWER FIELD, WET PREP: ABNORMAL
YEAST, WET PREP: ABNORMAL

## 2024-06-25 PROCEDURE — 81025 URINE PREGNANCY TEST: CPT | Performed by: ADVANCED PRACTICE MIDWIFE

## 2024-06-25 PROCEDURE — 87210 SMEAR WET MOUNT SALINE/INK: CPT | Performed by: ADVANCED PRACTICE MIDWIFE

## 2024-06-25 PROCEDURE — 99459 PELVIC EXAMINATION: CPT | Performed by: ADVANCED PRACTICE MIDWIFE

## 2024-06-25 PROCEDURE — G2211 COMPLEX E/M VISIT ADD ON: HCPCS | Performed by: ADVANCED PRACTICE MIDWIFE

## 2024-06-25 PROCEDURE — 99213 OFFICE O/P EST LOW 20 MIN: CPT | Performed by: ADVANCED PRACTICE MIDWIFE

## 2024-06-25 RX ORDER — LEVONORGESTREL/ETHIN.ESTRADIOL 0.1-0.02MG
1 TABLET ORAL DAILY
Qty: 84 TABLET | Refills: 3 | Status: SHIPPED | OUTPATIENT
Start: 2024-06-25

## 2024-06-25 NOTE — PROGRESS NOTES
SUBJECTIVE:   23 year old female complains of itching, irritating vaginal discharge for 2 weeks.  It now seems better with some supplements that she got at [employer]. She has a problem with chronic hypertension.  She would also like to restart birth control.  She would prefer not to take hormones, but it is difficult to use condoms all of the time. She declines STI testing.  She does not have hypertension. She said that she was talking when she had her BP taken today.      Patient's last menstrual period was 06/12/2024 (exact date).    OBJECTIVE:   She appears well, afebrile.  Abdomen: benign, soft, nontender, no masses.  Pelvic Exam: normal vagina and vulva, normal cervix without lesions or tenderness, uterus normal size anteverted, adenxa normal in size without tenderness, wet mount exam shows white blood cells.    BP retaken 120/72  UPT negative     ASSESSMENT/Plan:  (N89.8) Vaginal itching  (primary encounter diagnosis)  Plan: Wet prep - Clinic Collect - negative  We discussed probiotics and helpful supplement to help restore natural balance.     (Z30.011) Initiation of oral contraception  Plan: HCG Qual, Urine ([id number])   Reviewed methods available.   Alesse pills ordered.  Discussed how to start. They are effective after 1 week.  Discussed risk, benefits and warning signs.  Please get education from the pharmacist at first .

## 2024-08-23 ENCOUNTER — OFFICE VISIT (OUTPATIENT)
Dept: FAMILY MEDICINE | Facility: CLINIC | Age: 24
End: 2024-08-23
Payer: COMMERCIAL

## 2024-08-23 VITALS
SYSTOLIC BLOOD PRESSURE: 135 MMHG | HEIGHT: 69 IN | RESPIRATION RATE: 16 BRPM | WEIGHT: 167.5 LBS | TEMPERATURE: 97.2 F | DIASTOLIC BLOOD PRESSURE: 62 MMHG | HEART RATE: 60 BPM | OXYGEN SATURATION: 99 % | BODY MASS INDEX: 24.81 KG/M2

## 2024-08-23 DIAGNOSIS — H00.011 HORDEOLUM EXTERNUM OF RIGHT UPPER EYELID: Primary | ICD-10-CM

## 2024-08-23 PROBLEM — J45.990 EXERCISE-INDUCED ASTHMA: Status: RESOLVED | Noted: 2017-02-02 | Resolved: 2024-08-23

## 2024-08-23 PROCEDURE — 99213 OFFICE O/P EST LOW 20 MIN: CPT

## 2024-08-23 RX ORDER — ERYTHROMYCIN 5 MG/G
0.5 OINTMENT OPHTHALMIC 4 TIMES DAILY
Qty: 3.5 G | Refills: 0 | Status: SHIPPED | OUTPATIENT
Start: 2024-08-23

## 2024-08-23 RX ORDER — ERYTHROMYCIN 5 MG/G
0.5 OINTMENT OPHTHALMIC 4 TIMES DAILY
Qty: 3.5 G | Refills: 0 | Status: SHIPPED | OUTPATIENT
Start: 2024-08-23 | End: 2024-08-23

## 2024-08-23 ASSESSMENT — ASTHMA QUESTIONNAIRES
QUESTION_4 LAST FOUR WEEKS HOW OFTEN HAVE YOU USED YOUR RESCUE INHALER OR NEBULIZER MEDICATION (SUCH AS ALBUTEROL): NOT AT ALL
QUESTION_2 LAST FOUR WEEKS HOW OFTEN HAVE YOU HAD SHORTNESS OF BREATH: NOT AT ALL
QUESTION_5 LAST FOUR WEEKS HOW WOULD YOU RATE YOUR ASTHMA CONTROL: COMPLETELY CONTROLLED
QUESTION_1 LAST FOUR WEEKS HOW MUCH OF THE TIME DID YOUR ASTHMA KEEP YOU FROM GETTING AS MUCH DONE AT WORK, SCHOOL OR AT HOME: NONE OF THE TIME
ACT_TOTALSCORE: 25
ACT_TOTALSCORE: 25
QUESTION_3 LAST FOUR WEEKS HOW OFTEN DID YOUR ASTHMA SYMPTOMS (WHEEZING, COUGHING, SHORTNESS OF BREATH, CHEST TIGHTNESS OR PAIN) WAKE YOU UP AT NIGHT OR EARLIER THAN USUAL IN THE MORNING: NOT AT ALL

## 2024-08-23 ASSESSMENT — ENCOUNTER SYMPTOMS: EYE PAIN: 1

## 2024-08-23 ASSESSMENT — PAIN SCALES - GENERAL: PAINLEVEL: MODERATE PAIN (5)

## 2024-08-23 ASSESSMENT — VISUAL ACUITY: OU: 1

## 2024-08-23 NOTE — PROGRESS NOTES
Assessment & Plan  1. Hordeolum externum of right upper eyelid  Patient is a 24 year-old female presenting with hordeolum of right upper eyelid. Dicussed conservative management with frequent applications of warm/moist compresses and gentle massage to area. Prescribed topical erythromycin ointment to be applied to area QID for the next 5-7 days. Discussed proper use of medication(s) and potential side effects. Follow-up if symptoms fail to improve despite above interventions. Patient understands and is agreeable to plan as discussed in clinic.  - erythromycin (ROMYCIN) 5 MG/GM ophthalmic ointment; Place 0.5 inches into the right eye 4 times daily. Apply small amount to right upper eyelid 4 times per day for the next 5-7 days.  Dispense: 3.5 g; Refill: 0    Subjective   Hipolito is a 24 year old, presenting for the following health issues:  Eye Problem      8/23/2024     3:27 PM   Additional Questions   Roomed by AG   Accompanied by self     History of Present Illness       Reason for visit:  Eye concern  Symptom onset:  3-7 days ago  Symptom intensity:  Moderate  Symptom progression:  Worsening  Had these symptoms before:  No  What makes it worse:  Not having glasses on  What makes it better:  Compress sometimes   She is taking medications regularly.     Concern - R eye swelling   Onset: a few days ago, started to get bad the last day or so  Description: R eyelid  Intensity: moderate  Progression of Symptoms:  worsening  Accompanying Signs & Symptoms: swollen, red, hurts to blink, mom has a similar eye spot for a month, dry  - NO eye crusty-ness   Previous history of similar problem: no  Precipitating factors:        Worsened by: blinking  Alleviating factors:        Improved by: cold compress  Therapies tried and outcome: mom was given a topical that didn't do anything, optometrist told mom it was a blockage of a pore that turned into a bacterial infection and she has completed a round of antibiotics and will have to do  "another, pt is wondering if it is related/same as mom    Presenting with concerns of swelling to right upper eyelid for the past 3 days. Has been increasing in size and tenderness. Has not been able to apply warm compresses frequently due to being at work. Has been wearing glasses over contact lenses due to swelling. No eye makeup.     Denies changes in vision, eye discharge or mattering.       Objective    /62   Pulse 60   Temp 97.2  F (36.2  C) (Temporal)   Resp 16   Ht 1.74 m (5' 8.5\")   Wt 76 kg (167 lb 8 oz)   LMP 08/06/2024 (Exact Date)   SpO2 99%   BMI 25.09 kg/m    Body mass index is 25.09 kg/m .  Physical Exam  Vitals reviewed.   Constitutional:       Appearance: Normal appearance. She is not ill-appearing.   HENT:      Head: Normocephalic and atraumatic.      Right Ear: Tympanic membrane, ear canal and external ear normal.      Left Ear: Tympanic membrane, ear canal and external ear normal.      Ears:      Comments: Negative for middle ear effusion, TM injection, bulging, and erythema bilaterally.     Nose: No congestion or rhinorrhea.      Mouth/Throat:      Mouth: Mucous membranes are moist.      Pharynx: Oropharynx is clear. No oropharyngeal exudate or posterior oropharyngeal erythema.   Eyes:      General: Vision grossly intact.         Right eye: Hordeolum present. No discharge.      Conjunctiva/sclera: Conjunctivae normal.      Right eye: Right conjunctiva is not injected. No exudate.     Left eye: Left conjunctiva is not injected. No exudate.    Cardiovascular:      Rate and Rhythm: Normal rate and regular rhythm.      Heart sounds: Normal heart sounds, S1 normal and S2 normal. No murmur heard.  Pulmonary:      Effort: Pulmonary effort is normal.      Breath sounds: Normal breath sounds. No wheezing.   Lymphadenopathy:      Cervical: No cervical adenopathy.   Skin:     General: Skin is warm and dry.      Capillary Refill: Capillary refill takes less than 2 seconds.      Findings: No " lesion or rash.   Neurological:      Mental Status: She is alert.   Psychiatric:         Attention and Perception: Attention and perception normal.         Mood and Affect: Mood and affect normal.            Signed Electronically by: LEONARDO Mead CNP

## 2024-08-23 NOTE — PATIENT INSTRUCTIONS
Encourage frequent applications of warm, moist compresses to right upper eyelid to facilitate oil gland to release. I have prescribed a topical antibiotic ointment called erythromycin. Apply externally to right upper eye where swelling/pain is 4 times per day. If you do not notice improvement in swelling or pain despite these above measures please reach out via MyChart, we can consider oral antibiotics at that time.

## 2024-08-26 ENCOUNTER — MYC MEDICAL ADVICE (OUTPATIENT)
Dept: FAMILY MEDICINE | Facility: CLINIC | Age: 24
End: 2024-08-26
Payer: COMMERCIAL

## 2024-08-26 DIAGNOSIS — H00.011 HORDEOLUM EXTERNUM OF RIGHT UPPER EYELID: Primary | ICD-10-CM

## 2024-08-26 NOTE — TELEPHONE ENCOUNTER
Patient increasing in size and pain. Has been following interventions as discussed    Requesting next steps.     From your 8/23 note.   If you do not notice improvement in swelling or pain despite these above measures please reach out via MyChart, we can consider oral antibiotics at that time.     Milly Wynn RN  [facility] - Registered Nurse  Clinic Triage [place]   August 26, 2024

## 2024-08-30 ENCOUNTER — OFFICE VISIT (OUTPATIENT)
Dept: OPHTHALMOLOGY | Facility: CLINIC | Age: 24
End: 2024-08-30
Payer: COMMERCIAL

## 2024-08-30 DIAGNOSIS — H00.11 CHALAZION OF RIGHT UPPER EYELID: Primary | ICD-10-CM

## 2024-08-30 PROCEDURE — 92002 INTRM OPH EXAM NEW PATIENT: CPT | Performed by: STUDENT IN AN ORGANIZED HEALTH CARE EDUCATION/TRAINING PROGRAM

## 2024-08-30 ASSESSMENT — SLIT LAMP EXAM - LIDS: COMMENTS: NORMAL

## 2024-08-30 ASSESSMENT — VISUAL ACUITY
OD_CC: 20/25
METHOD: SNELLEN - LINEAR
OD_CC+: +1
OS_CC: 20/20
OS_CC+: -2
CORRECTION_TYPE: GLASSES

## 2024-08-30 ASSESSMENT — TONOMETRY
IOP_METHOD: ICARE
OS_IOP_MMHG: 21
OD_IOP_MMHG: 18

## 2024-08-30 ASSESSMENT — EXTERNAL EXAM - RIGHT EYE: OD_EXAM: NORMAL

## 2024-08-30 ASSESSMENT — EXTERNAL EXAM - LEFT EYE: OS_EXAM: NORMAL

## 2024-08-30 NOTE — LETTER
8/30/2024      Arlen William  [address]  [place] [address]      Dear Colleague,    Thank you for referring your patient, Arlen William, to the [facility]. Please see a copy of my visit note below.     Current Eye Medications:  Erythromycin Ointment right eye four times per day and   Amoxicillin 1 tablet by mouth twice per day.     Subjective:  Patient has been dealing with a hordeolum of her right upper lid for the last week. She thinks it came from trying a new waterproof mascara. She has been using warm compresses. There was some pus that drained Wednesday night. She feels today it's the best it has looked since it started. She has less pain and still cloudy vision.     Leaving next week for  training and hoping to get this resolved by then.      Objective:  See Ophthalmology Exam.       Assessment:  Arlen William is a 24 year old female who presents with:   Encounter Diagnosis   Name Primary?     Chalazion of right upper eyelid Some decompression a couple of days ago. Continue hot packing and will recheck on Wednesday with possible procedure as needed (leaving for  service on Thursday).        Plan:  Continue hot packing the right upper lid quite often  Decrease erythromycin ointment to once daily on the right eyelid  No need for further oral antibiotics  Chalazion  There are tiny oil glands in the upper eyelid near the eyelashes. They help lubricate the eyes.   If these glands become blocked, a small hard lump forms in the upper eyelid, called a chalazion.   The lump can take several weeks to grow, causing pain and tenderness, sensitivity to light, and increased tearing.    Home Care  Apply a hot compress for 15 minutes at least 4 times a day. Use a microwaveable pack filled with dry,uncooked rice, gel beads, etc.  This will reduce the swelling and soften the hardened oils blocking the duct.   Massage the area gently after applying the compress to  promote drainage.  Do not try to pop or squeeze the chalazion.  Once a day, with eyes closed, clean your eyelids with baby shampoo to help  reduce clogging of the duct, as well as help prevent recurrences.  Will Gray MD  [phone]             Again, thank you for allowing me to participate in the care of your patient.        Sincerely,        Will Gray MD

## 2024-08-30 NOTE — PATIENT INSTRUCTIONS
Continue hot packing the right upper lid quite often  Decrease erythromycin ointment to once daily on the right eyelid  No need for further oral antibiotics  Chalazion  There are tiny oil glands in the upper eyelid near the eyelashes. They help lubricate the eyes.   If these glands become blocked, a small hard lump forms in the upper eyelid, called a chalazion.   The lump can take several weeks to grow, causing pain and tenderness, sensitivity to light, and increased tearing.    Home Care  Apply a hot compress for 15 minutes at least 4 times a day. Use a microwaveable pack filled with dry,uncooked rice, gel beads, etc.  This will reduce the swelling and soften the hardened oils blocking the duct.   Massage the area gently after applying the compress to promote drainage.  Do not try to pop or squeeze the chalazion.  Once a day, with eyes closed, clean your eyelids with baby shampoo to help  reduce clogging of the duct, as well as help prevent recurrences.  Will Gray MD  [phone]

## 2024-08-30 NOTE — PROGRESS NOTES
Current Eye Medications:  Erythromycin Ointment right eye four times per day and   Amoxicillin 1 tablet by mouth twice per day.     Subjective:  Patient has been dealing with a hordeolum of her right upper lid for the last week. She thinks it came from trying a new waterproof mascara. She has been using warm compresses. There was some pus that drained Wednesday night. She feels today it's the best it has looked since it started. She has less pain and still cloudy vision.     Leaving next week for  training and hoping to get this resolved by then.      Objective:  See Ophthalmology Exam.       Assessment:  Arlen William is a 24 year old female who presents with:   Encounter Diagnosis   Name Primary?    Chalazion of right upper eyelid Some decompression a couple of days ago. Continue hot packing and will recheck on Wednesday with possible procedure as needed (leaving for  service on Thursday).        Plan:  Continue hot packing the right upper lid quite often  Decrease erythromycin ointment to once daily on the right eyelid  No need for further oral antibiotics  Chalazion  There are tiny oil glands in the upper eyelid near the eyelashes. They help lubricate the eyes.   If these glands become blocked, a small hard lump forms in the upper eyelid, called a chalazion.   The lump can take several weeks to grow, causing pain and tenderness, sensitivity to light, and increased tearing.    Home Care  Apply a hot compress for 15 minutes at least 4 times a day. Use a microwaveable pack filled with dry,uncooked rice, gel beads, etc.  This will reduce the swelling and soften the hardened oils blocking the duct.   Massage the area gently after applying the compress to promote drainage.  Do not try to pop or squeeze the chalazion.  Once a day, with eyes closed, clean your eyelids with baby shampoo to help  reduce clogging of the duct, as well as help prevent recurrences.  Will Gray MD  [phone]

## 2024-09-04 ENCOUNTER — OFFICE VISIT (OUTPATIENT)
Dept: OPHTHALMOLOGY | Facility: CLINIC | Age: 24
End: 2024-09-04
Payer: COMMERCIAL

## 2024-09-04 DIAGNOSIS — H00.11 CHALAZION OF RIGHT UPPER EYELID: Primary | ICD-10-CM

## 2024-09-04 PROCEDURE — 92012 INTRM OPH EXAM EST PATIENT: CPT | Performed by: STUDENT IN AN ORGANIZED HEALTH CARE EDUCATION/TRAINING PROGRAM

## 2024-09-04 ASSESSMENT — VISUAL ACUITY
CORRECTION_TYPE: GLASSES
METHOD: SNELLEN - LINEAR
OS_CC: 20/20
OD_CC: 20/20

## 2024-09-04 ASSESSMENT — SLIT LAMP EXAM - LIDS: COMMENTS: NORMAL

## 2024-09-04 ASSESSMENT — EXTERNAL EXAM - LEFT EYE: OS_EXAM: NORMAL

## 2024-09-04 ASSESSMENT — EXTERNAL EXAM - RIGHT EYE: OD_EXAM: NORMAL

## 2024-09-04 NOTE — LETTER
9/4/2024      Arlen William  [address]  [place] [address]      Dear Colleague,    Thank you for referring your patient, Arlen William, to the [facility]. Please see a copy of my visit note below.     Current Eye Medications: erythromycin bijan - RUL QHS     Subjective: RUL chalazion much improved since last visit, small bump still remains.      Objective:  See Ophthalmology Exam.       Assessment:  Arlen William is a 24 year old female who presents with:   Encounter Diagnosis   Name Primary?     Chalazion of right upper eyelid Improved. Encouraged her to continue hot packing.        Plan:  Stop erythromycin ointment     Continue hot packing    Ok to go back to contact lens use    Will Gray MD  [phone]       Again, thank you for allowing me to participate in the care of your patient.        Sincerely,        Will Gray MD

## 2024-09-04 NOTE — PATIENT INSTRUCTIONS
Stop erythromycin ointment     Continue hot packing    Ok to go back to contact lens use    Will Gray MD  [phone]

## 2024-09-04 NOTE — PROGRESS NOTES
Current Eye Medications: erythromycin bijan - RUL QHS     Subjective: RUL chalazion much improved since last visit, small bump still remains.      Objective:  See Ophthalmology Exam.       Assessment:  Arlen William is a 24 year old female who presents with:   Encounter Diagnosis   Name Primary?    Chalazion of right upper eyelid Improved. Encouraged her to continue hot packing.        Plan:  Stop erythromycin ointment     Continue hot packing    Ok to go back to contact lens use    Will Gray MD  [phone]

## 2024-09-10 ENCOUNTER — TELEPHONE (OUTPATIENT)
Dept: GASTROENTEROLOGY | Facility: CLINIC | Age: 24
End: 2024-09-10
Payer: COMMERCIAL

## 2024-09-10 NOTE — TELEPHONE ENCOUNTER
Letter received from [facility]   Appeal denied.     Writer updated patient via [employer] gave customer service number for further questions.     Letter sent to [facility] to be scanned to chart.

## 2024-11-04 ENCOUNTER — MYC MEDICAL ADVICE (OUTPATIENT)
Dept: OBGYN | Facility: CLINIC | Age: 24
End: 2024-11-04
Payer: COMMERCIAL

## 2024-11-06 ENCOUNTER — E-VISIT (OUTPATIENT)
Dept: FAMILY MEDICINE | Facility: CLINIC | Age: 24
End: 2024-11-06
Payer: COMMERCIAL

## 2024-11-06 DIAGNOSIS — N89.8 VAGINAL DISCHARGE: ICD-10-CM

## 2024-11-06 DIAGNOSIS — Z11.3 SCREENING FOR STD (SEXUALLY TRANSMITTED DISEASE): ICD-10-CM

## 2024-11-06 DIAGNOSIS — R35.0 URINARY FREQUENCY: Primary | ICD-10-CM

## 2024-11-06 PROCEDURE — 99421 OL DIG E/M SVC 5-10 MIN: CPT | Performed by: STUDENT IN AN ORGANIZED HEALTH CARE EDUCATION/TRAINING PROGRAM

## 2024-11-07 ENCOUNTER — PATIENT OUTREACH (OUTPATIENT)
Dept: MIDWIFE SERVICES | Facility: CLINIC | Age: 24
End: 2024-11-07
Payer: COMMERCIAL

## 2024-11-07 NOTE — PATIENT INSTRUCTIONS
Thank you for choosing us for your care. Given your symptoms, I would like you to do a lab-only visit to determine what is causing them.  I have placed the orders.  Please schedule an appointment with the lab right here in [facility], or call [phone].  I will let you know when the results are back and next steps to take.    Schedule a Lab Only appointment here.     
no

## 2024-11-12 ENCOUNTER — LAB (OUTPATIENT)
Dept: LAB | Facility: CLINIC | Age: 24
End: 2024-11-12
Payer: COMMERCIAL

## 2024-11-12 DIAGNOSIS — Z11.3 SCREENING FOR STD (SEXUALLY TRANSMITTED DISEASE): ICD-10-CM

## 2024-11-12 DIAGNOSIS — R35.0 URINARY FREQUENCY: ICD-10-CM

## 2024-11-12 DIAGNOSIS — N89.8 VAGINAL DISCHARGE: ICD-10-CM

## 2024-11-12 LAB
ALBUMIN UR-MCNC: NEGATIVE MG/DL
APPEARANCE UR: CLEAR
BACTERIA #/AREA URNS HPF: ABNORMAL /HPF
BILIRUB UR QL STRIP: NEGATIVE
CLUE CELLS: PRESENT
COLOR UR AUTO: YELLOW
GLUCOSE UR STRIP-MCNC: NEGATIVE MG/DL
HGB UR QL STRIP: ABNORMAL
KETONES UR STRIP-MCNC: NEGATIVE MG/DL
LEUKOCYTE ESTERASE UR QL STRIP: NEGATIVE
NITRATE UR QL: NEGATIVE
PH UR STRIP: 6 [PH] (ref 5–7)
RBC #/AREA URNS AUTO: ABNORMAL /HPF
SP GR UR STRIP: 1.02 (ref 1–1.03)
SQUAMOUS #/AREA URNS AUTO: ABNORMAL /LPF
TRICHOMONAS, WET PREP: ABNORMAL
UROBILINOGEN UR STRIP-ACNC: 0.2 E.U./DL
WBC #/AREA URNS AUTO: ABNORMAL /HPF
WBC'S/HIGH POWER FIELD, WET PREP: ABNORMAL
YEAST, WET PREP: ABNORMAL

## 2024-11-12 PROCEDURE — 87591 N.GONORRHOEAE DNA AMP PROB: CPT

## 2024-11-12 PROCEDURE — 87491 CHLMYD TRACH DNA AMP PROBE: CPT

## 2024-11-12 PROCEDURE — 81001 URINALYSIS AUTO W/SCOPE: CPT

## 2024-11-12 PROCEDURE — 87210 SMEAR WET MOUNT SALINE/INK: CPT

## 2024-11-13 DIAGNOSIS — B96.89 BV (BACTERIAL VAGINOSIS): Primary | ICD-10-CM

## 2024-11-13 DIAGNOSIS — N76.0 BV (BACTERIAL VAGINOSIS): Primary | ICD-10-CM

## 2024-11-13 LAB
C TRACH DNA SPEC QL NAA+PROBE: NEGATIVE
N GONORRHOEA DNA SPEC QL NAA+PROBE: NEGATIVE

## 2024-11-13 RX ORDER — METRONIDAZOLE 500 MG/1
500 TABLET ORAL 2 TIMES DAILY
Qty: 14 TABLET | Refills: 0 | Status: SHIPPED | OUTPATIENT
Start: 2024-11-13 | End: 2024-11-20

## 2025-02-14 SDOH — HEALTH STABILITY: PHYSICAL HEALTH: ON AVERAGE, HOW MANY DAYS PER WEEK DO YOU ENGAGE IN MODERATE TO STRENUOUS EXERCISE (LIKE A BRISK WALK)?: 3 DAYS

## 2025-02-14 SDOH — HEALTH STABILITY: PHYSICAL HEALTH: ON AVERAGE, HOW MANY MINUTES DO YOU ENGAGE IN EXERCISE AT THIS LEVEL?: 90 MIN

## 2025-02-14 ASSESSMENT — SOCIAL DETERMINANTS OF HEALTH (SDOH): HOW OFTEN DO YOU GET TOGETHER WITH FRIENDS OR RELATIVES?: ONCE A WEEK

## 2025-02-19 ENCOUNTER — OFFICE VISIT (OUTPATIENT)
Dept: FAMILY MEDICINE | Facility: CLINIC | Age: 25
End: 2025-02-19
Attending: STUDENT IN AN ORGANIZED HEALTH CARE EDUCATION/TRAINING PROGRAM
Payer: COMMERCIAL

## 2025-02-19 VITALS
HEART RATE: 77 BPM | DIASTOLIC BLOOD PRESSURE: 72 MMHG | HEIGHT: 69 IN | TEMPERATURE: 97.9 F | SYSTOLIC BLOOD PRESSURE: 122 MMHG | RESPIRATION RATE: 18 BRPM | BODY MASS INDEX: 24.44 KG/M2 | OXYGEN SATURATION: 98 % | WEIGHT: 165 LBS

## 2025-02-19 DIAGNOSIS — Z11.3 ROUTINE SCREENING FOR STI (SEXUALLY TRANSMITTED INFECTION): ICD-10-CM

## 2025-02-19 DIAGNOSIS — Z00.00 ROUTINE GENERAL MEDICAL EXAMINATION AT A HEALTH CARE FACILITY: Primary | ICD-10-CM

## 2025-02-19 DIAGNOSIS — R87.610 ATYPICAL SQUAMOUS CELLS OF UNDETERMINED SIGNIFICANCE (ASCUS) ON PAPANICOLAOU SMEAR OF CERVIX: ICD-10-CM

## 2025-02-19 DIAGNOSIS — Z12.4 CERVICAL CANCER SCREENING: ICD-10-CM

## 2025-02-19 LAB
C TRACH DNA SPEC QL NAA+PROBE: NEGATIVE
CLUE CELLS: ABNORMAL
HCV AB SERPL QL IA: NONREACTIVE
HIV 1+2 AB+HIV1 P24 AG SERPL QL IA: NONREACTIVE
N GONORRHOEA DNA SPEC QL NAA+PROBE: NEGATIVE
SPECIMEN TYPE: NORMAL
SPECIMEN TYPE: NORMAL
T PALLIDUM AB SER QL: NONREACTIVE
TRICHOMONAS, WET PREP: ABNORMAL
WBC'S/HIGH POWER FIELD, WET PREP: ABNORMAL
YEAST, WET PREP: ABNORMAL

## 2025-02-19 PROCEDURE — 36415 COLL VENOUS BLD VENIPUNCTURE: CPT | Performed by: STUDENT IN AN ORGANIZED HEALTH CARE EDUCATION/TRAINING PROGRAM

## 2025-02-19 PROCEDURE — 86780 TREPONEMA PALLIDUM: CPT | Performed by: STUDENT IN AN ORGANIZED HEALTH CARE EDUCATION/TRAINING PROGRAM

## 2025-02-19 PROCEDURE — 87210 SMEAR WET MOUNT SALINE/INK: CPT | Performed by: STUDENT IN AN ORGANIZED HEALTH CARE EDUCATION/TRAINING PROGRAM

## 2025-02-19 PROCEDURE — 86803 HEPATITIS C AB TEST: CPT | Performed by: STUDENT IN AN ORGANIZED HEALTH CARE EDUCATION/TRAINING PROGRAM

## 2025-02-19 PROCEDURE — 87491 CHLMYD TRACH DNA AMP PROBE: CPT | Performed by: STUDENT IN AN ORGANIZED HEALTH CARE EDUCATION/TRAINING PROGRAM

## 2025-02-19 PROCEDURE — 87591 N.GONORRHOEAE DNA AMP PROB: CPT | Performed by: STUDENT IN AN ORGANIZED HEALTH CARE EDUCATION/TRAINING PROGRAM

## 2025-02-19 PROCEDURE — 99395 PREV VISIT EST AGE 18-39: CPT | Performed by: STUDENT IN AN ORGANIZED HEALTH CARE EDUCATION/TRAINING PROGRAM

## 2025-02-19 PROCEDURE — 87389 HIV-1 AG W/HIV-1&-2 AB AG IA: CPT | Performed by: STUDENT IN AN ORGANIZED HEALTH CARE EDUCATION/TRAINING PROGRAM

## 2025-02-19 NOTE — PATIENT INSTRUCTIONS
Patient Education   Preventive Care Advice   This is general advice given by our system to help you stay healthy. However, your care team may have specific advice just for you. Please talk to your care team about your preventive care needs.  Nutrition  Eat 5 or more servings of fruits and vegetables each day.  Try wheat bread, brown rice and whole grain pasta (instead of white bread, rice, and pasta).  Get enough calcium and vitamin D. Check the label on foods and aim for 100% of the RDA (recommended daily allowance).  Lifestyle  Exercise at least 150 minutes each week  (30 minutes a day, 5 days a week).  Do muscle strengthening activities 2 days a week. These help control your weight and prevent disease.  No smoking.  Wear sunscreen to prevent skin cancer.  Have a dental exam and cleaning every 6 months.  Yearly exams  See your health care team every year to talk about:  Any changes in your health.  Any medicines your care team has prescribed.  Preventive care, family planning, and ways to prevent chronic diseases.  Shots (vaccines)   HPV shots (up to age 26), if you've never had them before.  Hepatitis B shots (up to age 59), if you've never had them before.  COVID-19 shot: Get this shot when it's due.  Flu shot: Get a flu shot every year.  Tetanus shot: Get a tetanus shot every 10 years.  Pneumococcal, hepatitis A, and RSV shots: Ask your care team if you need these based on your risk.  Shingles shot (for age 50 and up)  General health tests  Diabetes screening:  Starting at age 35, Get screened for diabetes at least every 3 years.  If you are younger than age 35, ask your care team if you should be screened for diabetes.  Cholesterol test: At age 39, start having a cholesterol test every 5 years, or more often if advised.  Bone density scan (DEXA): At age 50, ask your care team if you should have this scan for osteoporosis (brittle bones).  Hepatitis C: Get tested at least once in your life.  STIs (sexually  transmitted infections)  Before age 24: Ask your care team if you should be screened for STIs.  After age 24: Get screened for STIs if you're at risk. You are at risk for STIs (including HIV) if:  You are sexually active with more than one person.  You don't use condoms every time.  You or a partner was diagnosed with a sexually transmitted infection.  If you are at risk for HIV, ask about PrEP medicine to prevent HIV.  Get tested for HIV at least once in your life, whether you are at risk for HIV or not.  Cancer screening tests  Cervical cancer screening: If you have a cervix, begin getting regular cervical cancer screening tests starting at age 21.  Breast cancer scan (mammogram): If you've ever had breasts, begin having regular mammograms starting at age 40. This is a scan to check for breast cancer.  Colon cancer screening: It is important to start screening for colon cancer at age 45.  Have a colonoscopy test every 10 years (or more often if you're at risk) Or, ask your provider about stool tests like a FIT test every year or Cologuard test every 3 years.  To learn more about your testing options, visit:   .  For help making a decision, visit:   https://bit.ly/po86689.  Prostate cancer screening test: If you have a prostate, ask your care team if a prostate cancer screening test (PSA) at age 55 is right for you.  Lung cancer screening: If you are a current or former smoker ages 50 to 80, ask your care team if ongoing lung cancer screenings are right for you.  For informational purposes only. Not to replace the advice of your health care provider. Copyright   2023 [facility]. All rights reserved. Clinically reviewed by the [facility] Transitions Program. [employer] [id number] - REV 01/24.  Learning About Stress  What is stress?     Stress is your body's response to a hard situation. Your body can have a physical, emotional, or mental response. Stress is a fact of life for most people, and it  affects everyone differently. What causes stress for you may not be stressful for someone else.  A lot of things can cause stress. You may feel stress when you go on a job interview, take a test, or run a race. This kind of short-term stress is normal and even useful. It can help you if you need to work hard or react quickly. For example, stress can help you finish an important job on time.  Long-term stress is caused by ongoing stressful situations or events. Examples of long-term stress include long-term health problems, ongoing problems at work, or conflicts in your family. Long-term stress can harm your health.  How does stress affect your health?  When you are stressed, your body responds as though you are in danger. It makes hormones that speed up your heart, make you breathe faster, and give you a burst of energy. This is called the fight-or-flight stress response. If the stress is over quickly, your body goes back to normal and no harm is done.  But if stress happens too often or lasts too long, it can have bad effects. Long-term stress can make you more likely to get sick, and it can make symptoms of some diseases worse. If you tense up when you are stressed, you may develop neck, shoulder, or low back pain. Stress is linked to high blood pressure and heart disease.  Stress also harms your emotional health. It can make you segura, tense, or depressed. Your relationships may suffer, and you may not do well at work or school.  What can you do to manage stress?  You can try these things to help manage stress:   Do something active. Exercise or activity can help reduce stress. Walking is a great way to get started. Even everyday activities such as housecleaning or yard work can help.  Try yoga or hamzah chi. These techniques combine exercise and meditation. You may need some training at first to learn them.  Do something you enjoy. For example, listen to music or go to a movie. Practice your hobby or do volunteer  "work.  Meditate. This can help you relax, because you are not worrying about what happened before or what may happen in the future.  Do guided imagery. Imagine yourself in any setting that helps you feel calm. You can use online videos, books, or a teacher to guide you.  Do breathing exercises. For example:  From a standing position, bend forward from the waist with your knees slightly bent. Let your arms dangle close to the floor.  Breathe in slowly and deeply as you return to a standing position. Roll up slowly and lift your head last.  Hold your breath for just a few seconds in the standing position.  Breathe out slowly and bend forward from the waist.  Let your feelings out. Talk, laugh, cry, and express anger when you need to. Talking with supportive friends or family, a counselor, or a kailyn leader about your feelings is a healthy way to relieve stress. Avoid discussing your feelings with people who make you feel worse.  Write. It may help to write about things that are bothering you. This helps you find out how much stress you feel and what is causing it. When you know this, you can find better ways to cope.  What can you do to prevent stress?  You might try some of these things to help prevent stress:  Manage your time. This helps you find time to do the things you want and need to do.  Get enough sleep. Your body recovers from the stresses of the day while you are sleeping.  Get support. Your family, friends, and community can make a difference in how you experience stress.  Limit your news feed. Avoid or limit time on social media or news that may make you feel stressed.  Do something active. Exercise or activity can help reduce stress. Walking is a great way to get started.  Where can you learn more?  Go to https://www.Buddy.net/patiented  Enter N032 in the search box to learn more about \"Learning About Stress.\"  Current as of: October 24, 2023  Content Version: 14.3    2024 [email or web address]. "   Care instructions adapted under license by your healthcare professional. If you have questions about a medical condition or this instruction, always ask your healthcare professional. [email or web address] disclaims any warranty or liability for your use of this information.    Safer Sex: Care Instructions  Overview  Safer sex is a way to reduce your risk of getting a sexually transmitted infection (STI). It can also help prevent pregnancy.  Several products can help you practice safer sex and reduce your chance of STIs. One of the best is a condom. There are internal and external condoms. You can use a special rubber sheet (dental dam) for protection during oral sex. Disposable gloves can keep your hands from touching blood, semen, or other body fluids that can carry infections.  Remember that birth control methods such as diaphragms, IUDs, foams, and birth control pills do not stop you from getting STIs.  Follow-up care is a key part of your treatment and safety. Be sure to make and go to all appointments, and call your doctor if you are having problems. It's also a good idea to know your test results and keep a list of the medicines you take.  How can you care for yourself at home?  Think about getting vaccinated to help prevent hepatitis A, hepatitis B, and human papillomavirus (HPV). They can be spread through sex.  Use a condom every time you have sex. Use an external condom, which goes on the penis. Or use an internal condom, which goes into the vagina or anus.  Make sure you use the right size external condom. A condom that's too small can break easily. A condom that's too big can slip off during sex.  Use a new condom each time you have sex. Be careful not to poke a hole in the condom when you open the wrapper.  Don't use an internal condom and an external condom at the same time.  Never use petroleum jelly (such as Vaseline), grease, hand lotion, baby oil, or anything with oil in it. These products can  "make holes in the condom.  After intercourse, hold the edge of the condom as you remove it. This will help keep semen from spilling out of the condom.  Do not have sex with anyone who has symptoms of an STI, such as sores on the genitals or mouth.  Do not drink a lot of alcohol or use drugs before sex.  Limit your sex partners. Sex with one partner who has sex only with you can reduce your risk of getting an STI.  Don't share sex toys. But if you do share them, use a condom and clean the sex toys between each use.  Talk to any partners before you have sex. Talk about what you feel comfortable with and whether you have any boundaries with sex. And find out if your partner or partners may be at risk for any STI. Keep in mind that a person may be able to spread an STI even if they do not have symptoms. You and any partners may want to get tested for STIs.  Where can you learn more?  Go to https://www.Mimetas.net/patiented  Enter B608 in the search box to learn more about \"Safer Sex: Care Instructions.\"  Current as of: April 30, 2024  Content Version: 14.3    2024 [employer].   Care instructions adapted under license by your healthcare professional. If you have questions about a medical condition or this instruction, always ask your healthcare professional. [employer] disclaims any warranty or liability for your use of this information.       "

## 2025-02-19 NOTE — PROGRESS NOTES
Preventive Care Visit  [facility]  Prisca Norwood DO, Family Medicine  Feb 19, 2025      Assessment & Plan     Routine general medical examination at a health care facility  [facility].     Cervical cancer screening  Atypical squamous cells of undetermined significance (ASCUS) on Papanicolaou smear of cervix  - Pap Screen Reflex to HPV if ASCUS - Recommended Age 25 - 29 Years    Routine screening for STI (sexually transmitted infection)  - HIV Antigen Antibody Combo; Future  - Hepatitis C antibody; Future  - Treponema Abs w Reflex to RPR and Titer; Future  - Wet prep - Clinic Collect  - Neisseria gonorrhoeae PCR - Clinic Collect  - Chlamydia trachomatis PCR - Clinic Collect  - HIV Antigen Antibody Combo  - Hepatitis C antibody  - Treponema Abs w Reflex to RPR and Titer      Patient has been advised of split billing requirements and indicates understanding: Yes        Counseling  Appropriate preventive services were addressed with this patient via screening, questionnaire, or discussion as appropriate for fall prevention, nutrition, physical activity, Tobacco-use cessation, social engagement, weight loss and cognition.  Checklist reviewing preventive services available has been given to the patient.  Reviewed patient's diet, addressing concerns and/or questions.   She is at risk for lack of exercise and has been provided with information to increase physical activity for the benefit of her well-being.   She is at risk for psychosocial distress and has been provided with information to reduce risk.           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   Will be getting out of the  soon.   Working full time and 2 part-time jobs.     Sleep is fine. Sometimes works overnights. Feels exhausted regularly.     Doing talk therapy biweekly. Support system with friends and  family. Lives with a friend. Mental health  has been good.     Not a lot of time to exercise. Trying for 3 times per week. Weight lifting and little bit of cardio, yoga.     Diet has been okay. Not eating as much as should. Skips meals when busy, typically breakfast. Lot of fruit, doing better with veggies.     Recurrent BV. Uses boric acid as needed.     LMP 2/3/2025 - 2/6/2025   Got off of birth control in September/October.   Typically periods last 5 days, heavy first couple days then lighten up. Have been regular.     Heartburn better - if can sleep on side and drink water it helps. Normally at night    Some breathing issues as a kid, but no issues with asthma in adult life.       Health Care Directive  Patient does not have a Health Care Directive: Discussed advance care planning with patient; however, patient declined at this time.      2/14/2025   General Health   How would you rate your overall physical health? (!) FAIR   Feel stress (tense, anxious, or unable to sleep) To some extent   (!) STRESS CONCERN      2/14/2025   Nutrition   Three or more servings of calcium each day? Yes   Diet: Regular (no restrictions)   How many servings of fruit and vegetables per day? (!) 2-3   How many sweetened beverages each day? 0-1         2/14/2025   Exercise   Days per week of moderate/strenous exercise 3 days   Average minutes spent exercising at this level 90 min         2/14/2025   Social Factors   Frequency of gathering with friends or relatives Once a week   Worry food won't last until get money to buy more No   Food not last or not have enough money for food? No   Do you have housing? (Housing is defined as stable permanent housing and does not include staying ouside in a car, in a tent, in an abandoned building, in an overnight shelter, or couch-surfing.) Yes   Are you worried about losing your housing? No   Lack of transportation? No   Unable to get utilities (heat,electricity)? No         2/14/2025   Dental    Dentist two times every year? Yes         2/15/2024   TB Screening   Were you born outside of the [place]? No         Today's PHQ-2 Score:       2/19/2025     7:41 AM   PHQ-2 ( 1999 [employer])   Q1: Little interest or pleasure in doing things 0   Q2: Feeling down, depressed or hopeless 0   PHQ-2 Score 0    Q1: Little interest or pleasure in doing things Not at all   Q2: Feeling down, depressed or hopeless Not at all   PHQ-2 Score 0       Patient-reported           2/14/2025   Substance Use   Alcohol more than 3/day or more than 7/wk No   Do you use any other substances recreationally? No     Social History     Tobacco Use    Smoking status: Never     Passive exposure: Never    Smokeless tobacco: Never    Tobacco comments:     no smokers at home   Vaping Use    Vaping status: Never Used   Substance Use Topics    Alcohol use: Yes     Comment: Occasionally    Drug use: No          Mammogram Screening - Patient under 40 years of age: Routine Mammogram Screening not recommended.         2/14/2025   STI Screening   New sexual partner(s) since last STI/HIV test? (!) YES      History of abnormal Pap smear: YES - reflected in Problem List and Health Maintenance accordingly        11/24/2023    10:45 AM 10/10/2022     7:50 AM 8/26/2021     3:25 PM   PAP / HPV   PAP Atypical squamous cells of undetermined significance (ASC-US)  Atypical squamous cells of undetermined significance (ASC-US)  Negative for Intraepithelial Lesion or Malignancy (NILM)            2/14/2025   Contraception/Family Planning   Questions about contraception or family planning No        Reviewed and updated as needed this visit by Provider     Meds                Past Medical History:   Diagnosis Date    Abnormal Pap smear of cervix 08/12/2020    see problem list    Asthma, mild persistent     Concussion 01/2012    Impact test 3/12    Mononucleosis 06/2019     Past Surgical History:   Procedure Laterality Date    ARTHROSCOPIC RECONSTRUCTION ANTERIOR CRUCIATE  LIGAMENT  12    left    ESOPHAGOSCOPY, GASTROSCOPY, DUODENOSCOPY (EGD), COMBINED N/A 2016    Procedure: COMBINED ESOPHAGOSCOPY, GASTROSCOPY, DUODENOSCOPY (EGD), BIOPSY SINGLE OR MULTIPLE;  Surgeon: Laurie Pride MD;  Location: [facility]     HC TOOTH EXTRACTION W/FORCEP       OB History    Para Term  AB Living   0 0 0 0 0 0   SAB IAB Ectopic Multiple Live Births   0 0 0 0 0   Obstetric Comments   Not currently sexually active.      Lab work is in process  Labs reviewed in EPIC  BP Readings from Last 3 Encounters:   25 122/72   24 135/62   24 (!) 140/86    Wt Readings from Last 3 Encounters:   25 74.8 kg (165 lb)   24 76 kg (167 lb 8 oz)   24 76.3 kg (168 lb 3.2 oz)                  Patient Active Problem List   Diagnosis    Environmental allergies    Scoliosis    Leg length discrepancy    Torn ACL    Chronic rhinitis    Tinea versicolor    Dysmenorrhea    Other acne    Chest wall pain    Oropharyngeal dysphagia    Atypical squamous cells of undetermined significance (ASCUS) on Papanicolaou smear of cervix    Chronic pain of left knee    Chronic pain of right knee     Past Surgical History:   Procedure Laterality Date    ARTHROSCOPIC RECONSTRUCTION ANTERIOR CRUCIATE LIGAMENT  12    left    ESOPHAGOSCOPY, GASTROSCOPY, DUODENOSCOPY (EGD), COMBINED N/A 2016    Procedure: COMBINED ESOPHAGOSCOPY, GASTROSCOPY, DUODENOSCOPY (EGD), BIOPSY SINGLE OR MULTIPLE;  Surgeon: Laurie Pride MD;  Location: [facility]     HC TOOTH EXTRACTION W/FORCEP         Social History     Tobacco Use    Smoking status: Never     Passive exposure: Never    Smokeless tobacco: Never    Tobacco comments:     no smokers at home   Substance Use Topics    Alcohol use: Yes     Comment: Occasionally     Family History   Problem Relation Age of Onset    Diabetes Mother         gestational     GERD Mother     Gallbladder Disease Mother      Obesity Mother     Diabetes Maternal Grandmother     Cancer Maternal Grandmother         ovarian and uterine    Other Cancer Maternal Grandmother     Hypertension Father     Lipids Father     Hyperlipidemia Father     Obesity Father     Hypertension Paternal Grandfather     Anxiety Disorder Sister     Asthma Brother     Family History Negative No family hx of     C.A.D. No family hx of     Breast Cancer No family hx of     Cerebrovascular Disease No family hx of     Cancer - colorectal No family hx of     Prostate Cancer No family hx of     Alcohol/Drug No family hx of     Allergies No family hx of     Alzheimer Disease No family hx of     Anesthesia Reaction No family hx of     Arthritis No family hx of     Blood Disease No family hx of     Cardiovascular No family hx of     Circulatory No family hx of     Congenital Anomalies No family hx of     Connective Tissue Disorder No family hx of     Depression No family hx of     Endocrine Disease No family hx of     Eye Disorder No family hx of     Genetic Disorder No family hx of     Gastrointestinal Disease No family hx of     Genitourinary Problems No family hx of     Gynecology No family hx of     Musculoskeletal Disorder No family hx of     Mental Illness No family hx of     Substance Abuse No family hx of     Colon Cancer No family hx of     Thyroid Disease No family hx of     Osteoporosis No family hx of     Unknown/Adopted No family hx of     Coronary Artery Disease No family hx of     Other - See Comments No family hx of          Current Outpatient Medications   Medication Sig Dispense Refill    fish oil-omega-3 fatty acids 500 MG capsule Take 500 mg by mouth daily      Multiple Vitamin (MULTIVITAMINS PO) Take 1 tablet by mouth daily.      Cholecalciferol (VITAMIN D) 1000 UNITS capsule Take 1 capsule by mouth daily (Patient not taking: Reported on 8/23/2024)       No Known Allergies      Review of Systems  Constitutional, HEENT, cardiovascular, pulmonary, gi and  "gu systems are negative, except as otherwise noted.     Objective    Exam  /72   Pulse 77   Temp 97.9  F (36.6  C) (Oral)   Resp 18   Ht 1.753 m (5' 9\")   Wt 74.8 kg (165 lb)   LMP 02/03/2025   SpO2 98%   BMI 24.37 kg/m     Estimated body mass index is 24.37 kg/m  as calculated from the following:    Height as of this encounter: 1.753 m (5' 9\").    Weight as of this encounter: 74.8 kg (165 lb).    Physical Exam  GENERAL: alert and no distress  EYES: Eyes grossly normal to inspection, PERRL and conjunctivae and sclerae normal  HENT: ear canals and TM's normal, nose and mouth without ulcers or lesions  NECK: no adenopathy, no asymmetry, masses, or scars  RESP: lungs clear to auscultation - no rales, rhonchi or wheezes  CV: regular rate and rhythm, normal S1 S2, no S3 or S4, no murmur, click or rub, no peripheral edema  ABDOMEN: soft, nontender, no hepatosplenomegaly, no masses and bowel sounds normal   (female) w/bimanual: normal female external genitalia, normal urethral meatus, normal vaginal mucosa, and normal cervix without masses or discharge  MS: no gross musculoskeletal defects noted, no edema. Right bony rib protrusion near sternum approx rib 4-5, nontender.   SKIN: no suspicious lesions or rashes  NEURO: Normal strength and tone, mentation intact and speech normal  PSYCH: mentation appears normal, affect normal/bright        Signed Electronically by: Prisca Norwood,     "

## 2025-02-24 ENCOUNTER — PATIENT OUTREACH (OUTPATIENT)
Dept: FAMILY MEDICINE | Facility: CLINIC | Age: 25
End: 2025-02-24
Payer: COMMERCIAL

## 2025-02-24 PROBLEM — R87.610 ATYPICAL SQUAMOUS CELLS OF UNDETERMINED SIGNIFICANCE (ASCUS) ON PAPANICOLAOU SMEAR OF CERVIX: Status: ACTIVE | Noted: 2020-08-12

## 2025-02-24 LAB
BKR LAB AP GYN ADEQUACY: NORMAL
BKR LAB AP GYN INTERPRETATION: NORMAL
BKR LAB AP HPV REFLEX: NORMAL
BKR LAB AP LMP: NORMAL
BKR LAB AP PREVIOUS ABNL DX: NORMAL
BKR LAB AP PREVIOUS ABNORMAL: NORMAL
PATH REPORT.COMMENTS IMP SPEC: NORMAL
PATH REPORT.COMMENTS IMP SPEC: NORMAL
PATH REPORT.RELEVANT HX SPEC: NORMAL

## 2025-05-08 ENCOUNTER — TRANSFERRED RECORDS (OUTPATIENT)
Dept: HEALTH INFORMATION MANAGEMENT | Facility: CLINIC | Age: 25
End: 2025-05-08
Payer: COMMERCIAL

## 2025-05-12 NOTE — TELEPHONE ENCOUNTER
Left Voicemail (1st Attempt) for the patient to call back and schedule the following:    Appointment type: return  Provider: lisa walton  Return date: 12/20/2024  Specialty phone number: [phone]   Additonal Notes: 1 year follow up     [facility]   Orthopedics, Podiatry, Sports Medicine, Ent ,Eye , Audiology, Adult Endocrine & Diabetes, Nutrition & Medication Therapy Management Specialties   [facility]       Medicare Preventive Visit Patient Instructions  Thank you for completing your Welcome to Medicare Visit or Medicare Annual Wellness Visit today. Your next wellness visit will be due in one year (5/13/2026).  The screening/preventive services that you may require over the next 5-10 years are detailed below. Some tests may not apply to you based off risk factors and/or age. Screening tests ordered at today's visit but not completed yet may show as past due. Also, please note that scanned in results may not display below.  Preventive Screenings:  Service Recommendations Previous Testing/Comments   Colorectal Cancer Screening  * Colonoscopy    * Fecal Occult Blood Test (FOBT)/Fecal Immunochemical Test (FIT)  * Fecal DNA/Cologuard Test  * Flexible Sigmoidoscopy Age: 45-75 years old   Colonoscopy: every 10 years (may be performed more frequently if at higher risk)  OR  FOBT/FIT: every 1 year  OR  Cologuard: every 3 years  OR  Sigmoidoscopy: every 5 years  Screening may be recommended earlier than age 45 if at higher risk for colorectal cancer. Also, an individualized decision between you and your healthcare provider will decide whether screening between the ages of 76-85 would be appropriate. Colonoscopy: Not on file  FOBT/FIT: Not on file  Cologuard: Not on file  Sigmoidoscopy: Not on file          Breast Cancer Screening Age: 40+ years old  Frequency: every 1-2 years  Not required if history of left and right mastectomy Mammogram: 11/13/2019        Cervical Cancer Screening Between the ages of 21-29, pap smear recommended once every 3 years.   Between the ages of 30-65, can perform pap smear with HPV co-testing every 5 years.   Recommendations may differ for women with a history of total hysterectomy, cervical cancer, or abnormal pap smears in past. Pap Smear: Not on file        Hepatitis C Screening Once for adults born between 1945 and 1965  More frequently in patients at high risk for Hepatitis C Hep C Antibody:  07/24/2020    Screening Current   Diabetes Screening 1-2 times per year if you're at risk for diabetes or have pre-diabetes Fasting glucose: 141 mg/dL (8/27/2021)  A1C: 10.4 % (3/14/2025)  Screening Not Indicated  History Diabetes   Cholesterol Screening Once every 5 years if you don't have a lipid disorder. May order more often based on risk factors. Lipid panel: 03/14/2025    Screening Current     Other Preventive Screenings Covered by Medicare:  Abdominal Aortic Aneurysm (AAA) Screening: covered once if your at risk. You're considered to be at risk if you have a family history of AAA.  Lung Cancer Screening: covers low dose CT scan once per year if you meet all of the following conditions: (1) Age 55-77; (2) No signs or symptoms of lung cancer; (3) Current smoker or have quit smoking within the last 15 years; (4) You have a tobacco smoking history of at least 20 pack years (packs per day multiplied by number of years you smoked); (5) You get a written order from a healthcare provider.  Glaucoma Screening: covered annually if you're considered high risk: (1) You have diabetes OR (2) Family history of glaucoma OR (3)  aged 50 and older OR (4)  American aged 65 and older  Osteoporosis Screening: covered every 2 years if you meet one of the following conditions: (1) You're estrogen deficient and at risk for osteoporosis based off medical history and other findings; (2) Have a vertebral abnormality; (3) On glucocorticoid therapy for more than 3 months; (4) Have primary hyperparathyroidism; (5) On osteoporosis medications and need to assess response to drug therapy.   Last bone density test (DXA Scan): 05/02/2023.  HIV Screening: covered annually if you're between the age of 15-65. Also covered annually if you are younger than 15 and older than 65 with risk factors for HIV infection. For pregnant patients, it is covered up to 3 times per pregnancy.    Immunizations:  Immunization Recommendations    Influenza Vaccine Annual influenza vaccination during flu season is recommended for all persons aged >= 6 months who do not have contraindications   Pneumococcal Vaccine   * Pneumococcal conjugate vaccine = PCV13 (Prevnar 13), PCV15 (Vaxneuvance), PCV20 (Prevnar 20)  * Pneumococcal polysaccharide vaccine = PPSV23 (Pneumovax) Adults 19-63 yo with certain risk factors or if 65+ yo  If never received any pneumonia vaccine: recommend Prevnar 20 (PCV20)  Give PCV20 if previously received 1 dose of PCV13 or PPSV23   Hepatitis B Vaccine 3 dose series if at intermediate or high risk (ex: diabetes, end stage renal disease, liver disease)   Respiratory syncytial virus (RSV) Vaccine - COVERED BY MEDICARE PART D  * RSVPreF3 (Arexvy) CDC recommends that adults 60 years of age and older may receive a single dose of RSV vaccine using shared clinical decision-making (SCDM)   Tetanus (Td) Vaccine - COST NOT COVERED BY MEDICARE PART B Following completion of primary series, a booster dose should be given every 10 years to maintain immunity against tetanus. Td may also be given as tetanus wound prophylaxis.   Tdap Vaccine - COST NOT COVERED BY MEDICARE PART B Recommended at least once for all adults. For pregnant patients, recommended with each pregnancy.   Shingles Vaccine (Shingrix) - COST NOT COVERED BY MEDICARE PART B  2 shot series recommended in those 19 years and older who have or will have weakened immune systems or those 50 years and older     Health Maintenance Due:      Topic Date Due   • HIV Screening  Never done   • Colorectal Cancer Screening  Never done   • Breast Cancer Screening: Mammogram  11/13/2020   • Hepatitis C Screening  Completed     Immunizations Due:      Topic Date Due   • Pneumococcal Vaccine: Pediatrics (0 to 5 Years) and At-Risk Patients (6 to 64 Years) (2 of 2 - PCV) 12/13/2002   • COVID-19 Vaccine (1 - 2024-25 season) Never done     Advance Directives   What are advance directives?  Advance  directives are legal documents that state your wishes and plans for medical care. These plans are made ahead of time in case you lose your ability to make decisions for yourself. Advance directives can apply to any medical decision, such as the treatments you want, and if you want to donate organs.   What are the types of advance directives?  There are many types of advance directives, and each state has rules about how to use them. You may choose a combination of any of the following:  Living will:  This is a written record of the treatment you want. You can also choose which treatments you do not want, which to limit, and which to stop at a certain time. This includes surgery, medicine, IV fluid, and tube feedings.   Durable power of  for healthcare (DPAHC):  This is a written record that states who you want to make healthcare choices for you when you are unable to make them for yourself. This person, called a proxy, is usually a family member or a friend. You may choose more than 1 proxy.  Do not resuscitate (DNR) order:  A DNR order is used in case your heart stops beating or you stop breathing. It is a request not to have certain forms of treatment, such as CPR. A DNR order may be included in other types of advance directives.  Medical directive:  This covers the care that you want if you are in a coma, near death, or unable to make decisions for yourself. You can list the treatments you want for each condition. Treatment may include pain medicine, surgery, blood transfusions, dialysis, IV or tube feedings, and a ventilator (breathing machine).  Values history:  This document has questions about your views, beliefs, and how you feel and think about life. This information can help others choose the care that you would choose.  Why are advance directives important?  An advance directive helps you control your care. Although spoken wishes may be used, it is better to have your wishes written down. Spoken  wishes can be misunderstood, or not followed. Treatments may be given even if you do not want them. An advance directive may make it easier for your family to make difficult choices about your care.   Depression   Depression  is a medical condition that causes feelings of sadness or hopelessness that do not go away. Depression may cause you to lose interest in things you used to enjoy. These feelings may interfere with your daily life.  Call your local emergency number (911 in the ) if:   You think about harming yourself or someone else.  You have done something on purpose to hurt yourself.  The following resources are available at any time to help you, if needed:   National Suicide Prevention Lifeline: 1-200.548.3001 (4-167-434-TALK)   Suicide Hotline: 1-430.147.9640 (6-053-PNJDHBX)   For a list of international numbers: https://[employer].org/find-help/international-resources/  Treatment for depression may include medicine to relieve depression. Medicine is often used together with therapy. Therapy is a way for you to talk about your feelings and anything that may be causing depression. Therapy can be done alone or in a group. It may also be done with family members or a significant other.  Get regular physical activity.    Create a regular sleep schedule.    Eat a variety of healthy foods.    Do not drink alcohol or use drugs.     Cigarette Smoking and Your Health   Risks to your health if you smoke:  Nicotine and other chemicals found in tobacco damage every cell in your body. Even if you are a light smoker, you have an increased risk for cancer, heart disease, and lung disease. If you are pregnant or have diabetes, smoking increases your risk for complications.   Benefits to your health if you stop smoking:   You decrease respiratory symptoms such as coughing, wheezing, and shortness of breath.   You reduce your risk for cancers of the lung, mouth, throat, kidney, bladder, pancreas, stomach, and cervix. If you  already have cancer, you increase the benefits of chemotherapy. You also reduce your risk for cancer returning or a second cancer from developing.   You reduce your risk for heart disease, blood clots, heart attack, and stroke.   You reduce your risk for lung infections, and diseases such as pneumonia, asthma, chronic bronchitis, and emphysema.  Your circulation improves. More oxygen can be delivered to your body. If you have diabetes, you lower your risk for complications, such as kidney, artery, and eye diseases. You also lower your risk for nerve damage. Nerve damage can lead to amputations, poor vision, and blindness.  You improve your body's ability to heal and to fight infections.  For more information and support to stop smoking:   Smokefree.gov  Phone: 0- 299 - 681-2105  Web Address: www.[employer].gov     © Copyright [employer] 2018 Information is for End User's use only and may not be sold, redistributed or otherwise used for commercial purposes. All illustrations and images included in CareNotes® are the copyrighted property of [employer] or [employer]

## 2025-06-17 ENCOUNTER — OFFICE VISIT (OUTPATIENT)
Dept: FAMILY MEDICINE | Facility: CLINIC | Age: 25
End: 2025-06-17
Payer: COMMERCIAL

## 2025-06-17 ENCOUNTER — RESULTS FOLLOW-UP (OUTPATIENT)
Dept: FAMILY MEDICINE | Facility: CLINIC | Age: 25
End: 2025-06-17

## 2025-06-17 VITALS
HEART RATE: 74 BPM | RESPIRATION RATE: 18 BRPM | DIASTOLIC BLOOD PRESSURE: 82 MMHG | SYSTOLIC BLOOD PRESSURE: 124 MMHG | HEIGHT: 69 IN | TEMPERATURE: 97.5 F | BODY MASS INDEX: 24.79 KG/M2 | OXYGEN SATURATION: 96 % | WEIGHT: 167.4 LBS

## 2025-06-17 DIAGNOSIS — J03.90 TONSILLITIS: Primary | ICD-10-CM

## 2025-06-17 DIAGNOSIS — J02.9 SORE THROAT: ICD-10-CM

## 2025-06-17 LAB
BASOPHILS # BLD AUTO: 0 10E3/UL (ref 0–0.2)
BASOPHILS NFR BLD AUTO: 0 %
EOSINOPHIL # BLD AUTO: 0.1 10E3/UL (ref 0–0.7)
EOSINOPHIL NFR BLD AUTO: 1 %
ERYTHROCYTE [DISTWIDTH] IN BLOOD BY AUTOMATED COUNT: 12.8 % (ref 10–15)
HCT VFR BLD AUTO: 39 % (ref 35–47)
HGB BLD-MCNC: 12.7 G/DL (ref 11.7–15.7)
IMM GRANULOCYTES # BLD: 0 10E3/UL
IMM GRANULOCYTES NFR BLD: 0 %
LYMPHOCYTES # BLD AUTO: 2.7 10E3/UL (ref 0.8–5.3)
LYMPHOCYTES NFR BLD AUTO: 35 %
MCH RBC QN AUTO: 29 PG (ref 26.5–33)
MCHC RBC AUTO-ENTMCNC: 32.6 G/DL (ref 31.5–36.5)
MCV RBC AUTO: 89 FL (ref 78–100)
MONOCYTES # BLD AUTO: 0.6 10E3/UL (ref 0–1.3)
MONOCYTES NFR BLD AUTO: 7 %
MONOCYTES NFR BLD AUTO: NEGATIVE %
NEUTROPHILS # BLD AUTO: 4.4 10E3/UL (ref 1.6–8.3)
NEUTROPHILS NFR BLD AUTO: 57 %
PLATELET # BLD AUTO: 301 10E3/UL (ref 150–450)
RBC # BLD AUTO: 4.38 10E6/UL (ref 3.8–5.2)
WBC # BLD AUTO: 7.8 10E3/UL (ref 4–11)

## 2025-06-17 PROCEDURE — 36415 COLL VENOUS BLD VENIPUNCTURE: CPT | Performed by: FAMILY MEDICINE

## 2025-06-17 PROCEDURE — 86308 HETEROPHILE ANTIBODY SCREEN: CPT | Performed by: FAMILY MEDICINE

## 2025-06-17 PROCEDURE — 3074F SYST BP LT 130 MM HG: CPT | Performed by: FAMILY MEDICINE

## 2025-06-17 PROCEDURE — 99213 OFFICE O/P EST LOW 20 MIN: CPT | Performed by: FAMILY MEDICINE

## 2025-06-17 PROCEDURE — 85025 COMPLETE CBC W/AUTO DIFF WBC: CPT | Performed by: FAMILY MEDICINE

## 2025-06-17 PROCEDURE — 3079F DIAST BP 80-89 MM HG: CPT | Performed by: FAMILY MEDICINE

## 2025-06-17 RX ORDER — PREDNISONE 20 MG/1
TABLET ORAL
Qty: 10 TABLET | Refills: 0 | Status: SHIPPED | OUTPATIENT
Start: 2025-06-17

## 2025-06-17 NOTE — PROGRESS NOTES
"  Assessment & Plan     Tonsillitis  She had a negative Monospot and normal CBC.  Discussed with patient possibly she may have a peritonsillar abscess.  Advised patient to drink more warm liquid, she may take ibuprofen as needed for pain.  In addition given prescription for Augmentin and prednisone use has been prescribed  - Mononucleosis screen; Future  - CBC with platelets and differential; Future  - Mononucleosis screen  - CBC with platelets and differential  - predniSONE (DELTASONE) 20 MG tablet; 2 tab daily for 5 days  - amoxicillin-clavulanate (AUGMENTIN) 875-125 MG tablet; Take 1 tablet by mouth 2 times daily.    Sore throat  Negative Rapid strep and Monospot.          BMI    Subjective   Hipolito is a 24 year old, presenting for the following health issues:  Urgent Care  Patient reports she continued to have swelling tonsil, painful,  She was seen at the urgent care on Citlaly 15, 2025, previously, she was started on Zithromax, for 5 days.  She had one dose of dexamethasone.  She reported this did not help her symptoms.    She continued to have swelling tonsils, painful.  She has no chills, no fever, no headache.  No nausea no vomiting, no known sick contact no history of travel.        6/17/2025     8:48 AM   Additional Questions   Roomed by macrina pack ma     [facility]        ED/UC Followup:    Facility:  [facility]  Date of visit: 6/15/25  Reason for visit: swollen tonsils and sore throat  Current Status: on azithromycin for 5 days. throat does flare up at night. Strep was negative.        Review of Systems  Constitutional, HEENT, cardiovascular, pulmonary, GI, , musculoskeletal, neuro, skin, endocrine and psych systems are negative, except as otherwise noted.      Objective    /82 (BP Location: Right arm, Patient Position: Sitting, Cuff Size: Adult Regular)   Pulse 74   Temp 97.5  F (36.4  C) (Tympanic)   Resp 18   Ht 1.74 m (5' 8.5\")   Wt 75.9 kg (167 lb 6.4 oz)   " SpO2 96%   BMI 25.08 kg/m    Body mass index is 25.08 kg/m .  Physical Exam   GENERAL: alert and no distress  HENT: normal cephalic/atraumatic, ear canals and TM's normal, tonsillar hypertrophy, and tonsillar erythema  NECK: cervical adenopathy   RESP: lungs clear to auscultation - no rales, rhonchi or wheezes  CV: regular rate and rhythm, normal S1 S2, no S3 or S4, no murmur, click or rub, no peripheral edema  ABDOMEN: soft, nontender, no hepatosplenomegaly, no masses and bowel sounds normal  PSYCH: mentation appears normal, affect normal/bright    Orders Placed This Encounter   Procedures    Mononucleosis screen    CBC with platelets and differential    CBC with platelets and differential        CBC RESULTS:   Recent Labs   Lab Test 06/17/25  0906   WBC 7.8   RBC 4.38   HGB 12.7   HCT 39.0   MCV 89   MCH 29.0   MCHC 32.6   RDW 12.8        Negative for Monospot.    Signed Electronically by: Fabiana Jean MD